# Patient Record
Sex: MALE | Race: ASIAN | NOT HISPANIC OR LATINO | Employment: UNEMPLOYED | URBAN - METROPOLITAN AREA
[De-identification: names, ages, dates, MRNs, and addresses within clinical notes are randomized per-mention and may not be internally consistent; named-entity substitution may affect disease eponyms.]

---

## 2017-03-07 ENCOUNTER — OFFICE VISIT - HEALTHEAST (OUTPATIENT)
Dept: FAMILY MEDICINE | Facility: CLINIC | Age: 47
End: 2017-03-07

## 2017-03-07 DIAGNOSIS — R17 JAUNDICE: ICD-10-CM

## 2017-03-07 DIAGNOSIS — K29.70 GASTRITIS: ICD-10-CM

## 2017-03-07 DIAGNOSIS — R07.81 RIB PAIN ON RIGHT SIDE: ICD-10-CM

## 2017-03-07 DIAGNOSIS — E78.00 HYPERCHOLESTEREMIA: ICD-10-CM

## 2017-03-07 LAB — LDLC SERPL CALC-MCNC: 116 MG/DL

## 2017-03-07 ASSESSMENT — MIFFLIN-ST. JEOR: SCORE: 1370.22

## 2017-03-08 LAB
HAV IGM SERPL QL IA: NEGATIVE
HBV CORE IGM SERPL QL IA: NEGATIVE
HBV SURFACE AG SERPL QL IA: NEGATIVE
HCV AB SERPL QL IA: NEGATIVE

## 2017-04-11 ENCOUNTER — COMMUNICATION - HEALTHEAST (OUTPATIENT)
Dept: FAMILY MEDICINE | Facility: CLINIC | Age: 47
End: 2017-04-11

## 2017-04-12 ENCOUNTER — OFFICE VISIT - HEALTHEAST (OUTPATIENT)
Dept: FAMILY MEDICINE | Facility: CLINIC | Age: 47
End: 2017-04-12

## 2017-04-12 DIAGNOSIS — K70.10 ALCOHOLIC HEPATITIS (H): ICD-10-CM

## 2017-04-12 DIAGNOSIS — K92.1 HEMATOCHEZIA: ICD-10-CM

## 2017-04-12 DIAGNOSIS — G47.00 INSOMNIA, UNSPECIFIED TYPE: ICD-10-CM

## 2017-04-12 DIAGNOSIS — F10.939 ALCOHOL WITHDRAWAL (H): ICD-10-CM

## 2017-04-12 ASSESSMENT — MIFFLIN-ST. JEOR: SCORE: 1381.56

## 2017-04-17 ENCOUNTER — RECORDS - HEALTHEAST (OUTPATIENT)
Dept: ADMINISTRATIVE | Facility: OTHER | Age: 47
End: 2017-04-17

## 2017-04-19 ENCOUNTER — RECORDS - HEALTHEAST (OUTPATIENT)
Dept: ADMINISTRATIVE | Facility: OTHER | Age: 47
End: 2017-04-19

## 2017-04-20 ENCOUNTER — RECORDS - HEALTHEAST (OUTPATIENT)
Dept: ADMINISTRATIVE | Facility: OTHER | Age: 47
End: 2017-04-20

## 2017-04-24 ENCOUNTER — RECORDS - HEALTHEAST (OUTPATIENT)
Dept: ADMINISTRATIVE | Facility: OTHER | Age: 47
End: 2017-04-24

## 2017-05-01 ENCOUNTER — OFFICE VISIT - HEALTHEAST (OUTPATIENT)
Dept: FAMILY MEDICINE | Facility: CLINIC | Age: 47
End: 2017-05-01

## 2017-05-01 DIAGNOSIS — G47.00 INSOMNIA, UNSPECIFIED TYPE: ICD-10-CM

## 2017-05-01 DIAGNOSIS — K70.10 ALCOHOLIC HEPATITIS WITHOUT ASCITES (H): ICD-10-CM

## 2017-05-01 ASSESSMENT — MIFFLIN-ST. JEOR: SCORE: 1408.77

## 2017-05-16 ENCOUNTER — RECORDS - HEALTHEAST (OUTPATIENT)
Dept: ADMINISTRATIVE | Facility: OTHER | Age: 47
End: 2017-05-16

## 2017-05-18 ENCOUNTER — RECORDS - HEALTHEAST (OUTPATIENT)
Dept: ADMINISTRATIVE | Facility: OTHER | Age: 47
End: 2017-05-18

## 2017-05-24 ENCOUNTER — COMMUNICATION - HEALTHEAST (OUTPATIENT)
Dept: FAMILY MEDICINE | Facility: CLINIC | Age: 47
End: 2017-05-24

## 2017-06-28 ENCOUNTER — RECORDS - HEALTHEAST (OUTPATIENT)
Dept: ADMINISTRATIVE | Facility: OTHER | Age: 47
End: 2017-06-28

## 2017-08-01 ENCOUNTER — OFFICE VISIT - HEALTHEAST (OUTPATIENT)
Dept: FAMILY MEDICINE | Facility: CLINIC | Age: 47
End: 2017-08-01

## 2017-08-01 ENCOUNTER — COMMUNICATION - HEALTHEAST (OUTPATIENT)
Dept: FAMILY MEDICINE | Facility: CLINIC | Age: 47
End: 2017-08-01

## 2017-08-01 DIAGNOSIS — K29.70 GASTRITIS, PRESENCE OF BLEEDING UNSPECIFIED, UNSPECIFIED CHRONICITY, UNSPECIFIED GASTRITIS TYPE: ICD-10-CM

## 2017-08-01 DIAGNOSIS — G47.00 INSOMNIA, UNSPECIFIED TYPE: ICD-10-CM

## 2017-08-01 DIAGNOSIS — R35.0 URINARY FREQUENCY: ICD-10-CM

## 2017-08-01 DIAGNOSIS — R60.9 EDEMA: ICD-10-CM

## 2017-08-01 DIAGNOSIS — L30.9 DERMATITIS: ICD-10-CM

## 2017-08-01 ASSESSMENT — MIFFLIN-ST. JEOR: SCORE: 1531.24

## 2017-09-07 ENCOUNTER — OFFICE VISIT - HEALTHEAST (OUTPATIENT)
Dept: FAMILY MEDICINE | Facility: CLINIC | Age: 47
End: 2017-09-07

## 2017-09-07 DIAGNOSIS — R35.0 URINARY FREQUENCY: ICD-10-CM

## 2017-09-07 DIAGNOSIS — K13.21 ORAL LEUKOPLAKIA: ICD-10-CM

## 2017-09-07 DIAGNOSIS — D12.6 BENIGN ADENOMATOUS POLYP OF LARGE INTESTINE: ICD-10-CM

## 2017-09-07 DIAGNOSIS — Z00.00 ROUTINE GENERAL MEDICAL EXAMINATION AT A HEALTH CARE FACILITY: ICD-10-CM

## 2017-09-07 DIAGNOSIS — R74.8 ELEVATED LIVER ENZYMES: ICD-10-CM

## 2017-09-07 LAB — PSA SERPL-MCNC: 0.5 NG/ML (ref 0–2.5)

## 2017-09-07 ASSESSMENT — MIFFLIN-ST. JEOR: SCORE: 1541.45

## 2017-10-05 ENCOUNTER — RECORDS - HEALTHEAST (OUTPATIENT)
Dept: ADMINISTRATIVE | Facility: OTHER | Age: 47
End: 2017-10-05

## 2017-10-05 ENCOUNTER — OFFICE VISIT - HEALTHEAST (OUTPATIENT)
Dept: FAMILY MEDICINE | Facility: CLINIC | Age: 47
End: 2017-10-05

## 2017-10-05 DIAGNOSIS — Z23 NEED FOR IMMUNIZATION AGAINST INFLUENZA: ICD-10-CM

## 2017-10-05 DIAGNOSIS — G47.30 SLEEP APNEA: ICD-10-CM

## 2017-10-05 DIAGNOSIS — J31.0 CHRONIC RHINITIS: ICD-10-CM

## 2017-10-05 DIAGNOSIS — R51.9 HEADACHE: ICD-10-CM

## 2017-10-05 DIAGNOSIS — K29.70 GASTRITIS, PRESENCE OF BLEEDING UNSPECIFIED, UNSPECIFIED CHRONICITY, UNSPECIFIED GASTRITIS TYPE: ICD-10-CM

## 2017-10-05 ASSESSMENT — MIFFLIN-ST. JEOR: SCORE: 1531.24

## 2017-11-13 ENCOUNTER — OFFICE VISIT - HEALTHEAST (OUTPATIENT)
Dept: SLEEP MEDICINE | Facility: CLINIC | Age: 47
End: 2017-11-13

## 2017-11-13 DIAGNOSIS — R06.83 SNORING: ICD-10-CM

## 2017-11-13 DIAGNOSIS — G47.10 HYPERSOMNIA: ICD-10-CM

## 2017-11-13 DIAGNOSIS — R29.818 SUSPECTED SLEEP APNEA: ICD-10-CM

## 2017-11-13 DIAGNOSIS — G47.8 SLEEP DYSFUNCTION WITH SLEEP STAGE DISTURBANCE: ICD-10-CM

## 2017-11-13 ASSESSMENT — MIFFLIN-ST. JEOR: SCORE: 1541.22

## 2018-01-08 ENCOUNTER — OFFICE VISIT - HEALTHEAST (OUTPATIENT)
Dept: FAMILY MEDICINE | Facility: CLINIC | Age: 48
End: 2018-01-08

## 2018-01-08 DIAGNOSIS — K29.70 GASTRITIS, PRESENCE OF BLEEDING UNSPECIFIED, UNSPECIFIED CHRONICITY, UNSPECIFIED GASTRITIS TYPE: ICD-10-CM

## 2018-01-08 DIAGNOSIS — R79.89 ABNORMAL TSH: ICD-10-CM

## 2018-01-08 DIAGNOSIS — G47.30 SLEEP APNEA: ICD-10-CM

## 2018-01-08 DIAGNOSIS — D64.9 ANEMIA: ICD-10-CM

## 2018-01-08 LAB
ERYTHROCYTE [DISTWIDTH] IN BLOOD BY AUTOMATED COUNT: 14.4 % (ref 11–14.5)
HCT VFR BLD AUTO: 48.2 % (ref 40–54)
HGB BLD-MCNC: 15.5 G/DL (ref 14–18)
MCH RBC QN AUTO: 29.4 PG (ref 27–34)
MCHC RBC AUTO-ENTMCNC: 32.1 G/DL (ref 32–36)
MCV RBC AUTO: 92 FL (ref 80–100)
PLATELET # BLD AUTO: 286 THOU/UL (ref 140–440)
PMV BLD AUTO: 7.8 FL (ref 7–10)
RBC # BLD AUTO: 5.26 MILL/UL (ref 4.4–6.2)
T4 FREE SERPL-MCNC: 1.1 NG/DL (ref 0.7–1.8)
TSH SERPL DL<=0.005 MIU/L-ACNC: 5.8 UIU/ML (ref 0.3–5)
WBC: 9.9 THOU/UL (ref 4–11)

## 2018-01-08 ASSESSMENT — MIFFLIN-ST. JEOR: SCORE: 1540.32

## 2018-03-12 ENCOUNTER — OFFICE VISIT - HEALTHEAST (OUTPATIENT)
Dept: FAMILY MEDICINE | Facility: CLINIC | Age: 48
End: 2018-03-12

## 2018-03-12 DIAGNOSIS — R07.89 CHEST WALL PAIN: ICD-10-CM

## 2018-03-12 DIAGNOSIS — E03.8 SUBCLINICAL HYPOTHYROIDISM: ICD-10-CM

## 2018-03-12 ASSESSMENT — MIFFLIN-ST. JEOR: SCORE: 1569.8

## 2018-07-09 ENCOUNTER — OFFICE VISIT - HEALTHEAST (OUTPATIENT)
Dept: FAMILY MEDICINE | Facility: CLINIC | Age: 48
End: 2018-07-09

## 2018-07-09 DIAGNOSIS — R07.9 CHEST PAIN: ICD-10-CM

## 2018-07-09 DIAGNOSIS — F17.200 TOBACCO USE DISORDER: ICD-10-CM

## 2018-07-09 DIAGNOSIS — G47.33 OBSTRUCTIVE SLEEP APNEA SYNDROME: ICD-10-CM

## 2018-07-09 DIAGNOSIS — E03.8 SUBCLINICAL HYPOTHYROIDISM: ICD-10-CM

## 2018-07-09 ASSESSMENT — MIFFLIN-ST. JEOR: SCORE: 1556.19

## 2018-08-01 ENCOUNTER — OFFICE VISIT - HEALTHEAST (OUTPATIENT)
Dept: FAMILY MEDICINE | Facility: CLINIC | Age: 48
End: 2018-08-01

## 2018-08-01 DIAGNOSIS — R51.9 GENERALIZED HEADACHES: ICD-10-CM

## 2018-08-01 DIAGNOSIS — R53.83 FATIGUE: ICD-10-CM

## 2018-08-01 DIAGNOSIS — E03.8 SUBCLINICAL HYPOTHYROIDISM: ICD-10-CM

## 2018-08-01 LAB
ANION GAP SERPL CALCULATED.3IONS-SCNC: 10 MMOL/L (ref 5–18)
BASOPHILS # BLD AUTO: 0.1 THOU/UL (ref 0–0.2)
BASOPHILS NFR BLD AUTO: 1 % (ref 0–2)
BUN SERPL-MCNC: 8 MG/DL (ref 8–22)
CALCIUM SERPL-MCNC: 9.6 MG/DL (ref 8.5–10.5)
CHLORIDE BLD-SCNC: 105 MMOL/L (ref 98–107)
CO2 SERPL-SCNC: 26 MMOL/L (ref 22–31)
CREAT SERPL-MCNC: 1.39 MG/DL (ref 0.7–1.3)
EOSINOPHIL # BLD AUTO: 0.2 THOU/UL (ref 0–0.4)
EOSINOPHIL NFR BLD AUTO: 3 % (ref 0–6)
ERYTHROCYTE [DISTWIDTH] IN BLOOD BY AUTOMATED COUNT: 13.8 % (ref 11–14.5)
GFR SERPL CREATININE-BSD FRML MDRD: 55 ML/MIN/1.73M2
GLUCOSE BLD-MCNC: 101 MG/DL (ref 70–125)
HCT VFR BLD AUTO: 39.6 % (ref 40–54)
HGB BLD-MCNC: 13.1 G/DL (ref 14–18)
LYMPHOCYTES # BLD AUTO: 2.5 THOU/UL (ref 0.8–4.4)
LYMPHOCYTES NFR BLD AUTO: 30 % (ref 20–40)
MCH RBC QN AUTO: 28.3 PG (ref 27–34)
MCHC RBC AUTO-ENTMCNC: 33 G/DL (ref 32–36)
MCV RBC AUTO: 86 FL (ref 80–100)
MONOCYTES # BLD AUTO: 1.3 THOU/UL (ref 0–0.9)
MONOCYTES NFR BLD AUTO: 16 % (ref 2–10)
NEUTROPHILS # BLD AUTO: 4.2 THOU/UL (ref 2–7.7)
NEUTROPHILS NFR BLD AUTO: 50 % (ref 50–70)
PLATELET # BLD AUTO: 205 THOU/UL (ref 140–440)
PMV BLD AUTO: 8.1 FL (ref 7–10)
POTASSIUM BLD-SCNC: 3.6 MMOL/L (ref 3.5–5)
RBC # BLD AUTO: 4.62 MILL/UL (ref 4.4–6.2)
SODIUM SERPL-SCNC: 141 MMOL/L (ref 136–145)
T4 FREE SERPL-MCNC: 1 NG/DL (ref 0.7–1.8)
TSH SERPL DL<=0.005 MIU/L-ACNC: 5.31 UIU/ML (ref 0.3–5)
WBC: 8.3 THOU/UL (ref 4–11)

## 2018-08-01 RX ORDER — ACETAMINOPHEN 325 MG/1
650 TABLET ORAL EVERY 6 HOURS PRN
Qty: 90 TABLET | Refills: 2 | Status: SHIPPED | OUTPATIENT
Start: 2018-08-01 | End: 2022-11-01

## 2018-08-01 ASSESSMENT — MIFFLIN-ST. JEOR: SCORE: 1569.8

## 2018-08-06 ENCOUNTER — OFFICE VISIT - HEALTHEAST (OUTPATIENT)
Dept: FAMILY MEDICINE | Facility: CLINIC | Age: 48
End: 2018-08-06

## 2018-08-06 DIAGNOSIS — R35.0 URINARY FREQUENCY: ICD-10-CM

## 2018-08-06 DIAGNOSIS — E03.8 SUBCLINICAL HYPOTHYROIDISM: ICD-10-CM

## 2018-08-06 DIAGNOSIS — R35.89 POLYURIA: ICD-10-CM

## 2018-08-06 LAB
ALBUMIN UR-MCNC: NEGATIVE MG/DL
APPEARANCE UR: CLEAR
BILIRUB UR QL STRIP: NEGATIVE
COLOR UR AUTO: YELLOW
GLUCOSE UR STRIP-MCNC: NEGATIVE MG/DL
HGB UR QL STRIP: NEGATIVE
KETONES UR STRIP-MCNC: NEGATIVE MG/DL
LEUKOCYTE ESTERASE UR QL STRIP: NEGATIVE
NITRATE UR QL: NEGATIVE
PH UR STRIP: 6.5 [PH] (ref 5–8)
PSA SERPL-MCNC: 0.4 NG/ML (ref 0–2.5)
SP GR UR STRIP: <=1.005 (ref 1–1.03)
UROBILINOGEN UR STRIP-ACNC: NORMAL

## 2018-08-06 ASSESSMENT — MIFFLIN-ST. JEOR: SCORE: 1561.86

## 2018-08-07 ENCOUNTER — AMBULATORY - HEALTHEAST (OUTPATIENT)
Dept: FAMILY MEDICINE | Facility: CLINIC | Age: 48
End: 2018-08-07

## 2018-08-07 RX ORDER — TAMSULOSIN HYDROCHLORIDE 0.4 MG/1
0.4 CAPSULE ORAL
Qty: 30 CAPSULE | Refills: 5 | Status: SHIPPED | OUTPATIENT
Start: 2018-08-07 | End: 2021-10-21

## 2018-08-08 ENCOUNTER — COMMUNICATION - HEALTHEAST (OUTPATIENT)
Dept: FAMILY MEDICINE | Facility: CLINIC | Age: 48
End: 2018-08-08

## 2018-08-15 ENCOUNTER — OFFICE VISIT - HEALTHEAST (OUTPATIENT)
Dept: FAMILY MEDICINE | Facility: CLINIC | Age: 48
End: 2018-08-15

## 2018-08-15 DIAGNOSIS — Z09 FOLLOW-UP EXAM AFTER TREATMENT: ICD-10-CM

## 2018-08-15 DIAGNOSIS — R79.89 ELEVATED SERUM CREATININE: ICD-10-CM

## 2018-08-15 LAB
ALBUMIN SERPL-MCNC: 3.4 G/DL (ref 3.5–5)
ANION GAP SERPL CALCULATED.3IONS-SCNC: 13 MMOL/L (ref 5–18)
BUN SERPL-MCNC: 4 MG/DL (ref 8–22)
CALCIUM SERPL-MCNC: 9.4 MG/DL (ref 8.5–10.5)
CHLORIDE BLD-SCNC: 107 MMOL/L (ref 98–107)
CO2 SERPL-SCNC: 21 MMOL/L (ref 22–31)
CREAT SERPL-MCNC: 1.16 MG/DL (ref 0.7–1.3)
GFR SERPL CREATININE-BSD FRML MDRD: >60 ML/MIN/1.73M2
GLUCOSE BLD-MCNC: 102 MG/DL (ref 70–125)
PHOSPHATE SERPL-MCNC: 3.6 MG/DL (ref 2.5–4.5)
POTASSIUM BLD-SCNC: 3.7 MMOL/L (ref 3.5–5)
SODIUM SERPL-SCNC: 141 MMOL/L (ref 136–145)

## 2018-08-15 ASSESSMENT — MIFFLIN-ST. JEOR: SCORE: 1568.08

## 2018-08-20 ENCOUNTER — OFFICE VISIT - HEALTHEAST (OUTPATIENT)
Dept: FAMILY MEDICINE | Facility: CLINIC | Age: 48
End: 2018-08-20

## 2018-08-20 DIAGNOSIS — R20.2 PARESTHESIA: ICD-10-CM

## 2018-08-20 DIAGNOSIS — M79.671 FOOT PAIN, BILATERAL: ICD-10-CM

## 2018-08-20 DIAGNOSIS — M79.672 FOOT PAIN, BILATERAL: ICD-10-CM

## 2018-08-20 LAB — VIT B12 SERPL-MCNC: 562 PG/ML (ref 213–816)

## 2018-08-20 ASSESSMENT — MIFFLIN-ST. JEOR: SCORE: 1547.12

## 2018-08-25 LAB — VIT B1 PYROPHOSHATE BLD-SCNC: 80 NMOL/L (ref 70–180)

## 2018-09-07 ENCOUNTER — COMMUNICATION - HEALTHEAST (OUTPATIENT)
Dept: ADMINISTRATIVE | Facility: CLINIC | Age: 48
End: 2018-09-07

## 2018-10-09 ENCOUNTER — COMMUNICATION - HEALTHEAST (OUTPATIENT)
Dept: SCHEDULING | Facility: CLINIC | Age: 48
End: 2018-10-09

## 2018-10-15 ENCOUNTER — OFFICE VISIT - HEALTHEAST (OUTPATIENT)
Dept: FAMILY MEDICINE | Facility: CLINIC | Age: 48
End: 2018-10-15

## 2018-10-15 DIAGNOSIS — G25.81 RESTLESS LEGS SYNDROME: ICD-10-CM

## 2018-10-15 ASSESSMENT — MIFFLIN-ST. JEOR: SCORE: 1553.92

## 2018-11-12 ENCOUNTER — OFFICE VISIT - HEALTHEAST (OUTPATIENT)
Dept: FAMILY MEDICINE | Facility: CLINIC | Age: 48
End: 2018-11-12

## 2018-11-12 DIAGNOSIS — G25.81 RESTLESS LEGS SYNDROME: ICD-10-CM

## 2018-11-12 DIAGNOSIS — G47.00 INSOMNIA, UNSPECIFIED TYPE: ICD-10-CM

## 2018-11-12 DIAGNOSIS — F10.10 ALCOHOL ABUSE: ICD-10-CM

## 2018-11-12 DIAGNOSIS — Z23 NEED FOR IMMUNIZATION AGAINST INFLUENZA: ICD-10-CM

## 2018-11-12 RX ORDER — LANOLIN ALCOHOL/MO/W.PET/CERES
3-9 CREAM (GRAM) TOPICAL
Qty: 90 TABLET | Refills: 6 | Status: SHIPPED | OUTPATIENT
Start: 2018-11-12 | End: 2021-10-21

## 2018-11-12 ASSESSMENT — MIFFLIN-ST. JEOR: SCORE: 1540.32

## 2019-02-06 ENCOUNTER — COMMUNICATION - HEALTHEAST (OUTPATIENT)
Dept: FAMILY MEDICINE | Facility: CLINIC | Age: 49
End: 2019-02-06

## 2019-02-06 DIAGNOSIS — K29.70 GASTRITIS AND GASTRODUODENITIS: ICD-10-CM

## 2019-02-06 DIAGNOSIS — K29.90 GASTRITIS AND GASTRODUODENITIS: ICD-10-CM

## 2019-06-11 ENCOUNTER — COMMUNICATION - HEALTHEAST (OUTPATIENT)
Dept: FAMILY MEDICINE | Facility: CLINIC | Age: 49
End: 2019-06-11

## 2019-06-13 ENCOUNTER — OFFICE VISIT - HEALTHEAST (OUTPATIENT)
Dept: FAMILY MEDICINE | Facility: CLINIC | Age: 49
End: 2019-06-13

## 2019-06-13 DIAGNOSIS — R91.1 NODULE OF UPPER LOBE OF LEFT LUNG: ICD-10-CM

## 2019-06-13 DIAGNOSIS — R07.89 CHEST WALL PAIN: ICD-10-CM

## 2019-06-13 DIAGNOSIS — K29.70 GASTRITIS, PRESENCE OF BLEEDING UNSPECIFIED, UNSPECIFIED CHRONICITY, UNSPECIFIED GASTRITIS TYPE: ICD-10-CM

## 2019-06-13 DIAGNOSIS — R10.13 EPIGASTRIC PAIN: ICD-10-CM

## 2019-06-13 ASSESSMENT — MIFFLIN-ST. JEOR: SCORE: 1556.19

## 2019-06-24 ENCOUNTER — OFFICE VISIT - HEALTHEAST (OUTPATIENT)
Dept: FAMILY MEDICINE | Facility: CLINIC | Age: 49
End: 2019-06-24

## 2019-06-24 DIAGNOSIS — M94.0 COSTOCHONDRITIS: ICD-10-CM

## 2019-06-24 DIAGNOSIS — K29.70 GASTRITIS, PRESENCE OF BLEEDING UNSPECIFIED, UNSPECIFIED CHRONICITY, UNSPECIFIED GASTRITIS TYPE: ICD-10-CM

## 2019-06-24 DIAGNOSIS — R91.1 NODULE OF UPPER LOBE OF LEFT LUNG: ICD-10-CM

## 2019-06-24 ASSESSMENT — MIFFLIN-ST. JEOR: SCORE: 1558.46

## 2019-07-05 ENCOUNTER — COMMUNICATION - HEALTHEAST (OUTPATIENT)
Dept: FAMILY MEDICINE | Facility: CLINIC | Age: 49
End: 2019-07-05

## 2019-07-05 DIAGNOSIS — M94.0 COSTOCHONDRITIS: ICD-10-CM

## 2019-07-15 ENCOUNTER — OFFICE VISIT - HEALTHEAST (OUTPATIENT)
Dept: FAMILY MEDICINE | Facility: CLINIC | Age: 49
End: 2019-07-15

## 2019-07-15 DIAGNOSIS — M79.671 RIGHT FOOT PAIN: ICD-10-CM

## 2019-07-15 DIAGNOSIS — M94.0 COSTOCHONDRITIS: ICD-10-CM

## 2019-07-15 DIAGNOSIS — R07.89 CHEST WALL PAIN: ICD-10-CM

## 2019-07-15 LAB — URATE SERPL-MCNC: 5.7 MG/DL (ref 3–8)

## 2019-07-15 ASSESSMENT — MIFFLIN-ST. JEOR: SCORE: 1538.04

## 2019-07-16 ENCOUNTER — COMMUNICATION - HEALTHEAST (OUTPATIENT)
Dept: FAMILY MEDICINE | Facility: CLINIC | Age: 49
End: 2019-07-16

## 2019-09-24 ENCOUNTER — OFFICE VISIT - HEALTHEAST (OUTPATIENT)
Dept: FAMILY MEDICINE | Facility: CLINIC | Age: 49
End: 2019-09-24

## 2019-09-24 DIAGNOSIS — R91.1 NODULE OF UPPER LOBE OF LEFT LUNG: ICD-10-CM

## 2019-09-24 DIAGNOSIS — R20.2 PARESTHESIA: ICD-10-CM

## 2019-09-24 DIAGNOSIS — E03.8 SUBCLINICAL HYPOTHYROIDISM: ICD-10-CM

## 2019-09-24 DIAGNOSIS — R07.9 CHEST PAIN, UNSPECIFIED TYPE: ICD-10-CM

## 2019-09-24 DIAGNOSIS — F17.200 TOBACCO USE DISORDER: ICD-10-CM

## 2019-09-24 LAB
ALBUMIN SERPL-MCNC: 3.6 G/DL (ref 3.5–5)
ALP SERPL-CCNC: 55 U/L (ref 45–120)
ALT SERPL W P-5'-P-CCNC: 15 U/L (ref 0–45)
ANION GAP SERPL CALCULATED.3IONS-SCNC: 10 MMOL/L (ref 5–18)
AST SERPL W P-5'-P-CCNC: 20 U/L (ref 0–40)
BILIRUB SERPL-MCNC: 0.3 MG/DL (ref 0–1)
BUN SERPL-MCNC: 7 MG/DL (ref 8–22)
CALCIUM SERPL-MCNC: 9.7 MG/DL (ref 8.5–10.5)
CHLORIDE BLD-SCNC: 109 MMOL/L (ref 98–107)
CO2 SERPL-SCNC: 24 MMOL/L (ref 22–31)
CREAT SERPL-MCNC: 1.13 MG/DL (ref 0.7–1.3)
GFR SERPL CREATININE-BSD FRML MDRD: >60 ML/MIN/1.73M2
GLUCOSE BLD-MCNC: 111 MG/DL (ref 70–125)
HBA1C MFR BLD: 5.1 % (ref 3.5–6)
POTASSIUM BLD-SCNC: 4.3 MMOL/L (ref 3.5–5)
PROT SERPL-MCNC: 7 G/DL (ref 6–8)
SODIUM SERPL-SCNC: 143 MMOL/L (ref 136–145)
TSH SERPL DL<=0.005 MIU/L-ACNC: 1.68 UIU/ML (ref 0.3–5)

## 2019-09-24 ASSESSMENT — MIFFLIN-ST. JEOR: SCORE: 1528.97

## 2019-09-25 ENCOUNTER — AMBULATORY - HEALTHEAST (OUTPATIENT)
Dept: FAMILY MEDICINE | Facility: CLINIC | Age: 49
End: 2019-09-25

## 2019-09-25 ENCOUNTER — COMMUNICATION - HEALTHEAST (OUTPATIENT)
Dept: FAMILY MEDICINE | Facility: CLINIC | Age: 49
End: 2019-09-25

## 2019-09-25 DIAGNOSIS — G62.9 NEUROPATHY: ICD-10-CM

## 2019-09-25 DIAGNOSIS — E55.9 VITAMIN D DEFICIENCY: ICD-10-CM

## 2019-09-25 DIAGNOSIS — R10.13 EPIGASTRIC PAIN: ICD-10-CM

## 2019-09-25 LAB — 25(OH)D3 SERPL-MCNC: 26.9 NG/ML (ref 30–80)

## 2019-09-25 RX ORDER — MAGNESIUM HYDROXIDE/ALUMINUM HYDROXICE/SIMETHICONE 120; 1200; 1200 MG/30ML; MG/30ML; MG/30ML
30 SUSPENSION ORAL EVERY 4 HOURS PRN
Qty: 800 ML | Refills: 3 | Status: SHIPPED | OUTPATIENT
Start: 2019-09-25 | End: 2021-10-21

## 2019-09-27 ENCOUNTER — HOSPITAL ENCOUNTER (OUTPATIENT)
Dept: CT IMAGING | Facility: HOSPITAL | Age: 49
Discharge: HOME | End: 2019-09-27
Attending: FAMILY MEDICINE

## 2019-09-27 DIAGNOSIS — R91.1 NODULE OF UPPER LOBE OF LEFT LUNG: ICD-10-CM

## 2019-09-30 ENCOUNTER — COMMUNICATION - HEALTHEAST (OUTPATIENT)
Dept: FAMILY MEDICINE | Facility: CLINIC | Age: 49
End: 2019-09-30

## 2019-12-04 ENCOUNTER — OFFICE VISIT - HEALTHEAST (OUTPATIENT)
Dept: FAMILY MEDICINE | Facility: CLINIC | Age: 49
End: 2019-12-04

## 2019-12-04 DIAGNOSIS — F17.200 TOBACCO USE DISORDER: ICD-10-CM

## 2019-12-04 DIAGNOSIS — R07.9 CHEST PAIN, UNSPECIFIED TYPE: ICD-10-CM

## 2019-12-04 DIAGNOSIS — G25.81 RESTLESS LEGS SYNDROME: ICD-10-CM

## 2019-12-04 ASSESSMENT — MIFFLIN-ST. JEOR: SCORE: 1506.29

## 2019-12-11 ENCOUNTER — HOSPITAL ENCOUNTER (OUTPATIENT)
Dept: CARDIOLOGY | Facility: HOSPITAL | Age: 49
Discharge: HOME | End: 2019-12-11
Attending: FAMILY MEDICINE

## 2019-12-11 DIAGNOSIS — R07.9 CHEST PAIN, UNSPECIFIED TYPE: ICD-10-CM

## 2019-12-11 LAB
CV STRESS CURRENT BP HE: NORMAL
CV STRESS CURRENT HR HE: 104
CV STRESS CURRENT HR HE: 105
CV STRESS CURRENT HR HE: 106
CV STRESS CURRENT HR HE: 112
CV STRESS CURRENT HR HE: 112
CV STRESS CURRENT HR HE: 116
CV STRESS CURRENT HR HE: 120
CV STRESS CURRENT HR HE: 120
CV STRESS CURRENT HR HE: 127
CV STRESS CURRENT HR HE: 130
CV STRESS CURRENT HR HE: 131
CV STRESS CURRENT HR HE: 134
CV STRESS CURRENT HR HE: 135
CV STRESS CURRENT HR HE: 142
CV STRESS CURRENT HR HE: 95
CV STRESS CURRENT HR HE: 96
CV STRESS CURRENT HR HE: 97
CV STRESS CURRENT HR HE: 98
CV STRESS DEVIATION TIME HE: NORMAL
CV STRESS ECHO PERCENT HR HE: NORMAL
CV STRESS EXERCISE STAGE HE: NORMAL
CV STRESS EXERCISE STAGE REACHED HE: NORMAL
CV STRESS FINAL RESTING BP HE: NORMAL
CV STRESS FINAL RESTING HR HE: 95
CV STRESS MAX HR HE: 142
CV STRESS MAX TREADMILL GRADE HE: 10
CV STRESS MAX TREADMILL SPEED HE: 1.7
CV STRESS PEAK DIA BP HE: NORMAL
CV STRESS PEAK SYS BP HE: NORMAL
CV STRESS PHASE HE: NORMAL
CV STRESS PROTOCOL HE: NORMAL
CV STRESS RESTING PT POSITION HE: NORMAL
CV STRESS RESTING PT POSITION HE: NORMAL
CV STRESS ST DEVIATION AMOUNT HE: NORMAL
CV STRESS ST DEVIATION ELEVATION HE: NORMAL
CV STRESS ST EVELATION AMOUNT HE: NORMAL
CV STRESS TEST TYPE HE: NORMAL
CV STRESS TOTAL STAGE TIME MIN 1 HE: NORMAL
RATE PRESSURE PRODUCT: NORMAL
STRESS ECHO BASELINE DIASTOLIC HE: 84
STRESS ECHO BASELINE HR: 96
STRESS ECHO BASELINE SYSTOLIC BP: 132
STRESS ECHO CALCULATED PERCENT HR: 82 %
STRESS ECHO LAST STRESS DIASTOLIC BP: 82
STRESS ECHO LAST STRESS HR: 142
STRESS ECHO LAST STRESS SYSTOLIC BP: 124
STRESS ECHO POST ESTIMATED WORKLOAD: 2.8
STRESS ECHO POST EXERCISE DUR MIN: 1
STRESS ECHO POST EXERCISE DUR SEC: 36
STRESS ECHO TARGET HR: 173

## 2019-12-19 ENCOUNTER — OFFICE VISIT - HEALTHEAST (OUTPATIENT)
Dept: FAMILY MEDICINE | Facility: CLINIC | Age: 49
End: 2019-12-19

## 2019-12-19 DIAGNOSIS — R07.9 CHEST PAIN, UNSPECIFIED TYPE: ICD-10-CM

## 2019-12-19 DIAGNOSIS — F17.200 TOBACCO USE DISORDER: ICD-10-CM

## 2019-12-19 DIAGNOSIS — G25.81 RESTLESS LEGS SYNDROME: ICD-10-CM

## 2019-12-19 DIAGNOSIS — E55.9 VITAMIN D DEFICIENCY: ICD-10-CM

## 2019-12-19 ASSESSMENT — MIFFLIN-ST. JEOR: SCORE: 1501.76

## 2019-12-23 ENCOUNTER — COMMUNICATION - HEALTHEAST (OUTPATIENT)
Dept: FAMILY MEDICINE | Facility: CLINIC | Age: 49
End: 2019-12-23

## 2019-12-23 ENCOUNTER — HOSPITAL ENCOUNTER (OUTPATIENT)
Dept: NUCLEAR MEDICINE | Facility: HOSPITAL | Age: 49
Discharge: HOME | End: 2019-12-23
Attending: FAMILY MEDICINE

## 2019-12-23 ENCOUNTER — HOSPITAL ENCOUNTER (OUTPATIENT)
Dept: CARDIOLOGY | Facility: HOSPITAL | Age: 49
Discharge: HOME | End: 2019-12-23
Attending: FAMILY MEDICINE

## 2019-12-23 DIAGNOSIS — R07.9 CHEST PAIN, UNSPECIFIED TYPE: ICD-10-CM

## 2019-12-23 LAB
CV STRESS CURRENT BP HE: NORMAL
CV STRESS CURRENT HR HE: 105
CV STRESS CURRENT HR HE: 105
CV STRESS CURRENT HR HE: 106
CV STRESS CURRENT HR HE: 109
CV STRESS CURRENT HR HE: 110
CV STRESS CURRENT HR HE: 112
CV STRESS CURRENT HR HE: 119
CV STRESS CURRENT HR HE: 120
CV STRESS CURRENT HR HE: 126
CV STRESS CURRENT HR HE: 128
CV STRESS CURRENT HR HE: 129
CV STRESS CURRENT HR HE: 130
CV STRESS CURRENT HR HE: 136
CV STRESS CURRENT HR HE: 137
CV STRESS CURRENT HR HE: 139
CV STRESS CURRENT HR HE: 141
CV STRESS CURRENT HR HE: 142
CV STRESS CURRENT HR HE: 142
CV STRESS CURRENT HR HE: 143
CV STRESS CURRENT HR HE: 144
CV STRESS CURRENT HR HE: 144
CV STRESS CURRENT HR HE: 145
CV STRESS CURRENT HR HE: 147
CV STRESS CURRENT HR HE: 147
CV STRESS CURRENT HR HE: 148
CV STRESS CURRENT HR HE: 150
CV STRESS CURRENT HR HE: 150
CV STRESS CURRENT HR HE: 97
CV STRESS CURRENT HR HE: 98
CV STRESS DEVIATION TIME HE: NORMAL
CV STRESS ECHO PERCENT HR HE: NORMAL
CV STRESS EXERCISE STAGE HE: NORMAL
CV STRESS EXERCISE STAGE REACHED HE: NORMAL
CV STRESS FINAL RESTING BP HE: NORMAL
CV STRESS FINAL RESTING HR HE: 106
CV STRESS MAX HR HE: 150
CV STRESS MAX TREADMILL GRADE HE: 12
CV STRESS MAX TREADMILL SPEED HE: 2.5
CV STRESS PEAK DIA BP HE: NORMAL
CV STRESS PEAK SYS BP HE: NORMAL
CV STRESS PHASE HE: NORMAL
CV STRESS PROTOCOL HE: NORMAL
CV STRESS RESTING PT POSITION HE: NORMAL
CV STRESS RESTING PT POSITION HE: NORMAL
CV STRESS ST DEVIATION AMOUNT HE: NORMAL
CV STRESS ST DEVIATION ELEVATION HE: NORMAL
CV STRESS ST EVELATION AMOUNT HE: NORMAL
CV STRESS TEST TYPE HE: NORMAL
CV STRESS TOTAL STAGE TIME MIN 1 HE: NORMAL
RATE PRESSURE PRODUCT: NORMAL
STRESS ECHO BASELINE DIASTOLIC HE: 76
STRESS ECHO BASELINE SYSTOLIC BP: 110
STRESS ECHO CALCULATED PERCENT HR: 87 %
STRESS ECHO LAST STRESS DIASTOLIC BP: 62
STRESS ECHO LAST STRESS HR: 150
STRESS ECHO LAST STRESS SYSTOLIC BP: 150
STRESS ECHO POST ESTIMATED WORKLOAD: 7.1
STRESS ECHO POST EXERCISE DUR MIN: 5
STRESS ECHO POST EXERCISE DUR SEC: 59
STRESS ECHO TARGET HR: 173

## 2020-02-12 ENCOUNTER — COMMUNICATION - HEALTHEAST (OUTPATIENT)
Dept: FAMILY MEDICINE | Facility: CLINIC | Age: 50
End: 2020-02-12

## 2020-02-12 DIAGNOSIS — K29.70 GASTRITIS AND GASTRODUODENITIS: ICD-10-CM

## 2020-02-12 DIAGNOSIS — K29.90 GASTRITIS AND GASTRODUODENITIS: ICD-10-CM

## 2020-11-21 ENCOUNTER — AMBULATORY - HEALTHEAST (OUTPATIENT)
Dept: NURSING | Facility: CLINIC | Age: 50
End: 2020-11-21

## 2021-01-05 ENCOUNTER — OFFICE VISIT - HEALTHEAST (OUTPATIENT)
Dept: FAMILY MEDICINE | Facility: CLINIC | Age: 51
End: 2021-01-05

## 2021-01-05 ENCOUNTER — AMBULATORY - HEALTHEAST (OUTPATIENT)
Dept: FAMILY MEDICINE | Facility: CLINIC | Age: 51
End: 2021-01-05

## 2021-01-05 DIAGNOSIS — K21.9 GASTROESOPHAGEAL REFLUX DISEASE, UNSPECIFIED WHETHER ESOPHAGITIS PRESENT: ICD-10-CM

## 2021-01-05 DIAGNOSIS — K29.70 GASTRITIS AND GASTRODUODENITIS: ICD-10-CM

## 2021-01-05 DIAGNOSIS — K29.90 GASTRITIS AND GASTRODUODENITIS: ICD-10-CM

## 2021-01-05 DIAGNOSIS — J02.9 SORE THROAT: ICD-10-CM

## 2021-01-05 ASSESSMENT — MIFFLIN-ST. JEOR: SCORE: 1510.83

## 2021-02-11 ENCOUNTER — OFFICE VISIT - HEALTHEAST (OUTPATIENT)
Dept: FAMILY MEDICINE | Facility: CLINIC | Age: 51
End: 2021-02-11

## 2021-02-11 DIAGNOSIS — E78.00 HYPERCHOLESTEREMIA: ICD-10-CM

## 2021-02-11 DIAGNOSIS — G25.81 RESTLESS LEGS SYNDROME: ICD-10-CM

## 2021-02-11 DIAGNOSIS — K21.9 GASTROESOPHAGEAL REFLUX DISEASE, UNSPECIFIED WHETHER ESOPHAGITIS PRESENT: ICD-10-CM

## 2021-02-11 DIAGNOSIS — Z00.00 ROUTINE GENERAL MEDICAL EXAMINATION AT A HEALTH CARE FACILITY: ICD-10-CM

## 2021-02-11 DIAGNOSIS — E03.8 SUBCLINICAL HYPOTHYROIDISM: ICD-10-CM

## 2021-02-11 DIAGNOSIS — R74.8 ELEVATED LIVER ENZYMES: ICD-10-CM

## 2021-02-11 LAB
ALBUMIN SERPL-MCNC: 3.4 G/DL (ref 3.5–5)
ALP SERPL-CCNC: 44 U/L (ref 45–120)
ALT SERPL W P-5'-P-CCNC: 37 U/L (ref 0–45)
ANION GAP SERPL CALCULATED.3IONS-SCNC: 11 MMOL/L (ref 5–18)
AST SERPL W P-5'-P-CCNC: 73 U/L (ref 0–40)
BILIRUB SERPL-MCNC: 0.3 MG/DL (ref 0–1)
BUN SERPL-MCNC: 12 MG/DL (ref 8–22)
CALCIUM SERPL-MCNC: 8.9 MG/DL (ref 8.5–10.5)
CHLORIDE BLD-SCNC: 106 MMOL/L (ref 98–107)
CO2 SERPL-SCNC: 23 MMOL/L (ref 22–31)
CREAT SERPL-MCNC: 1.05 MG/DL (ref 0.7–1.3)
GFR SERPL CREATININE-BSD FRML MDRD: >60 ML/MIN/1.73M2
GLUCOSE BLD-MCNC: 118 MG/DL (ref 70–125)
LDLC SERPL CALC-MCNC: 106 MG/DL
POTASSIUM BLD-SCNC: 3.8 MMOL/L (ref 3.5–5)
PROT SERPL-MCNC: 7 G/DL (ref 6–8)
SODIUM SERPL-SCNC: 140 MMOL/L (ref 136–145)
TSH SERPL DL<=0.005 MIU/L-ACNC: 4.23 UIU/ML (ref 0.3–5)

## 2021-02-11 RX ORDER — PRAMIPEXOLE DIHYDROCHLORIDE 0.25 MG/1
0.25 TABLET ORAL AT BEDTIME
Qty: 30 TABLET | Refills: 11 | Status: SHIPPED | OUTPATIENT
Start: 2021-02-11 | End: 2022-02-24

## 2021-02-11 ASSESSMENT — MIFFLIN-ST. JEOR: SCORE: 1519.9

## 2021-05-29 NOTE — PROGRESS NOTES
ASSESMENT AND PLAN:  Diagnoses and all orders for this visit:    Chest wall pain  I am concerned he has a significant case of costochondritis.  Discussed treatment options with the patient, reviewed the risks and benefits of prednisone as prescribed below, and discussed indications for routine and emergent follow-up with the patient.  Patient will schedule follow-up in about 10 days here in the clinic for recheck, sooner if problems.  -     predniSONE (DELTASONE) 20 MG tablet; Take 40 mg by mouth daily for 7 days.  Dispense: 14 tablet; Refill: 0    Gastritis, presence of bleeding unspecified, unspecified chronicity, unspecified gastritis type  Viewed the results of his colonoscopy and EGD from 2017 with the patient with the help of a professional .  Patient will continue to take the Maalox prescribed from the ER, refills sent as ordered below, and continue take his omeprazole.  ER evaluation referred him to the GI clinic for follow-up and I informed the patient that they would be following up with him on scheduling this appointment.    Nodule of upper lobe of left lung  Reviewed CT scan results with the patient, radiology recommended repeat CT scan in 3 months.  Counseled the patient on the importance of follow-up here in the clinic in 3 months at which time we will schedule the repeat CT.    Epigastric pain  -     aluminum-magnesium hydroxide-simethicone (MAALOX ADVANCED) 200-200-20 mg/5 mL Susp; Take 30 mL by mouth every 4 (four) hours as needed.  Dispense: 800 mL; Refill: 3          SUBJECTIVE: 49-year-old male here for follow-up on his recent emergency room visit for chest pain/abdominal pain.  Please see that work-up and notes for details.  We were able to review that today in detail with the patient and his wife with the help of a professional .  Reviewed the results of his CT scan, which was negative for any cause of his pain.  It did show a pulmonary nodule of 1 cm diameter that will  require follow-up as detailed above.  Since leaving the emergency room, he has been taking the Maalox as directed and is also been taking his omeprazole every day.  He is no longer taking naproxen.  Patient reports that the abdominal/chest pain has not improved.  If he continues to get intermittent to moderate to severe pain and he reports a focus of the pain bilaterally on the lateral upper chest wall.  The pain definitely worsens when he coughs or moves his trunk/chest wall into certain positions.  He reports that is been difficult for him to sleep the last couple of nights because when he puts direct pressure over the lateral chest wall when trying to lay on his side it becomes very painful.  No fevers, no vomiting, no black tarry stools, he gets occasional red blood in the stool-previous work-up had included EGD and colonoscopy which did show some hemorrhoids.  Patient has a past history of alcohol abuse and liver disease.  He has been completely sober from alcohol.    Past Medical History:   Diagnosis Date     GERD (gastroesophageal reflux disease)      Hepatitis     ETOH     Hyperlipidemia      Seizure (H)      Patient Active Problem List   Diagnosis     Nicotine Dependence     Hemorrhoids     Serum Enzyme Levels - AST (SGOT) Elevated     Limb Pain     Accessory Navicular     Lower Back Pain     Gastritis Due To H. Pylori     Abdominal Pain     Bone Cyst     Hypercholesteremia     Gastritis     Alcohol withdrawal seizure (H)     Hypokalemia     Hyponatremia     Thrombocytopenia (H)     Delirium tremens (H)     Hematochezia     Maxillary sinusitis, acute     Insomnia     Alcoholic hepatitis     Benign adenomatous polyp of large intestine     Elevated liver enzymes     Oral leukoplakia     Subclinical hypothyroidism     Alcohol abuse     Nodule of upper lobe of left lung     Current Outpatient Medications   Medication Sig Dispense Refill     acetaminophen (TYLENOL) 325 MG tablet Take 2 tablets (650 mg total) by  "mouth every 6 (six) hours as needed for pain (takes 2 at a time). 90 tablet 2     aluminum-magnesium hydroxide-simethicone (MAALOX ADVANCED) 200-200-20 mg/5 mL Susp Take 30 mL by mouth every 4 (four) hours as needed. 800 mL 3     melatonin 3 mg Tab tablet Take 1-3 tablets (3-9 mg total) by mouth at bedtime as needed. 90 tablet 6     multivitamin therapeutic (THERAGRAN) tablet Take 1 tablet by mouth daily.  0     omeprazole (PRILOSEC) 20 MG capsule Take 1 capsule (20 mg total) by mouth daily. 30 capsule 6     omeprazole (PRILOSEC) 20 MG capsule Take 1 capsule (20 mg total) by mouth daily. 30 capsule 11     pramipexole (MIRAPEX) 0.25 MG tablet Take 1 tablet (0.25 mg total) by mouth at bedtime. 30 tablet 6     predniSONE (DELTASONE) 20 MG tablet Take 40 mg by mouth daily for 7 days. 14 tablet 0     tamsulosin (FLOMAX) 0.4 mg cap Take 1 capsule (0.4 mg total) by mouth Daily after breakfast. 30 capsule 5     thiamine 50 MG tablet Take 1 tablet (50 mg total) by mouth daily.  0     No current facility-administered medications for this visit.      Social History     Tobacco Use   Smoking Status Current Every Day Smoker   Smokeless Tobacco Never Used   Tobacco Comment    Betelnut without Tobacco       OBJECTICE: /62   Pulse 96   Temp 97.6  F (36.4  C) (Oral)   Resp 24   Ht 5' 3\" (1.6 m)   Wt 175 lb 8 oz (79.6 kg)   SpO2 91%   BMI 31.09 kg/m       No results found for this or any previous visit (from the past 24 hour(s)).     GEN-alert, appropriate, in no apparent distress   HEENT-oropharynx is clear, neck is supple with no palpable mass or lymphadenopathy   CV-regular rate and rhythm with no murmur, no ankle edema   RESP-lungs clear to auscultation   ABDOMINAL-soft and nontender to palpation with no palpable mass organomegaly   Musculoskeletal- exquisite pain that repeats his symptoms with direct palpation of the lateral chest wall bilaterally.   SKIN-no ulcers or vesicles      Boaz Bell"

## 2021-05-29 NOTE — TELEPHONE ENCOUNTER
New Appointment Needed  What is the reason for the visit:    Inpatient/ED Follow Up Appt Request  At what hospital or facility were you seen?: [facility]  What is the reason you were seen?: Pain in abdomen and chest  What date were you admitted?: date: 6/10/19  What date were you discharged?: date: 6/10/19  What was the recommended timeframe for your follow up appointment?: within 2 days  Provider Preference: PCP only - wants just Dr Bell, declined appointments offered with other providers including [facility].  How soon do you need to be seen?: as soon as can be scheduled.  Waitlist offered?: No  Okay to leave a detailed message:  Yes

## 2021-05-29 NOTE — PROGRESS NOTES
ASSESMENT AND PLAN:  Diagnoses and all orders for this visit:    Costochondritis  Good response to the initial course of prednisone but some recurrence since completing that as detailed below.  Therefore, we are going to try a longer tapering course of prednisone as prescribed below.  Reviewed the risks and benefits of the medication with the patient with the help of a professional .  Follow-up if he does not have complete resolution of his symptoms with this 15-day treatment.  -     predniSONE (DELTASONE) 10 mg tablet; 30 mg every morning for 5 days then 20 mg for 5 days then 10 mg for 5 days.  Dispense: 30 tablet; Refill: 0    Nodule of upper lobe of left lung  Reviewed his previous CT scan results, reviewed differential diagnosis, counseling done on the importance of follow-up in 3 months here in the clinic at which time we will arrange for the follow-up CT scan.    Gastritis, presence of bleeding unspecified, unspecified chronicity, unspecified gastritis type  Good improvement in his symptoms as detailed below, continue omeprazole daily.        SUBJECTIVE: 49-year-old male comes in today for follow-up on his recent visit here in the clinic for chest pain.  Please see that note for details.  He had excellent response to prednisone.  He took it for 7 days.  He had complete resolution of his chest pain during the daytime and would have a little bit of mild chest pain when laying on his lateral chest wall at night but it was very tolerable.  Then after completing the 7-day treatment, he started to have some recurrence of some daytime pain and some worsening pain at night.  He has occasional nonproductive cough with no hemoptysis.  Coughing does make the pain worse.  He has not been having fevers or chills.  No shortness of breath.  No blood in the stool or black tarry stools.  Patient reports that the epigastric pain he was having previously has resolved.    Past Medical History:   Diagnosis Date     GERD  (gastroesophageal reflux disease)      Hepatitis     ETOH     Hyperlipidemia      Seizure (H)      Patient Active Problem List   Diagnosis     Nicotine Dependence     Hemorrhoids     Serum Enzyme Levels - AST (SGOT) Elevated     Limb Pain     Accessory Navicular     Lower Back Pain     Gastritis Due To H. Pylori     Abdominal Pain     Bone Cyst     Hypercholesteremia     Gastritis     Alcohol withdrawal seizure (H)     Hypokalemia     Hyponatremia     Thrombocytopenia (H)     Delirium tremens (H)     Hematochezia     Maxillary sinusitis, acute     Insomnia     Alcoholic hepatitis     Benign adenomatous polyp of large intestine     Elevated liver enzymes     Oral leukoplakia     Subclinical hypothyroidism     Alcohol abuse     Nodule of upper lobe of left lung     Current Outpatient Medications   Medication Sig Dispense Refill     acetaminophen (TYLENOL) 325 MG tablet Take 2 tablets (650 mg total) by mouth every 6 (six) hours as needed for pain (takes 2 at a time). 90 tablet 2     aluminum-magnesium hydroxide-simethicone (MAALOX ADVANCED) 200-200-20 mg/5 mL Susp Take 30 mL by mouth every 4 (four) hours as needed. 800 mL 3     melatonin 3 mg Tab tablet Take 1-3 tablets (3-9 mg total) by mouth at bedtime as needed. 90 tablet 6     multivitamin therapeutic (THERAGRAN) tablet Take 1 tablet by mouth daily.  0     tamsulosin (FLOMAX) 0.4 mg cap Take 1 capsule (0.4 mg total) by mouth Daily after breakfast. 30 capsule 5     thiamine 50 MG tablet Take 1 tablet (50 mg total) by mouth daily.  0     omeprazole (PRILOSEC) 20 MG capsule Take 1 capsule (20 mg total) by mouth daily. 30 capsule 11     pramipexole (MIRAPEX) 0.25 MG tablet Take 1 tablet (0.25 mg total) by mouth at bedtime. 30 tablet 6     predniSONE (DELTASONE) 10 mg tablet 30 mg every morning for 5 days then 20 mg for 5 days then 10 mg for 5 days. 30 tablet 0     No current facility-administered medications for this visit.      Social History     Tobacco Use   Smoking  "Status Current Every Day Smoker   Smokeless Tobacco Never Used   Tobacco Comment    Betelnut without Tobacco       OBJECTICE: /64 (Patient Site: Left Arm)   Pulse (!) 104   Temp 97.9  F (36.6  C) (Oral)   Resp 20   Ht 5' 3\" (1.6 m)   Wt 176 lb (79.8 kg)   SpO2 97%   BMI 31.18 kg/m       No results found for this or any previous visit (from the past 24 hour(s)).     GEN-alert, appropriate, in no apparent distress   HEENT-oropharynx is clear, neck is supple, sclera are clear   CV-regular rate and rhythm with no murmur   RESP-lungs clear to auscultation   ABDOMINAL-soft and nontender to palpation   Musculoskeletal- some repetition of his pain with squeezing of the lateral aspects of the rib cage/chest wall bilaterally.   SKIN-no ulcers or vesicles overlying his areas of pain.      Boaz Bell          "

## 2021-05-30 VITALS — BODY MASS INDEX: 25.34 KG/M2 | HEIGHT: 63 IN | WEIGHT: 143 LBS

## 2021-05-30 VITALS — BODY MASS INDEX: 24.14 KG/M2 | WEIGHT: 136.25 LBS | HEIGHT: 63 IN

## 2021-05-30 VITALS — BODY MASS INDEX: 24.27 KG/M2 | WEIGHT: 137 LBS | HEIGHT: 63 IN

## 2021-05-30 NOTE — TELEPHONE ENCOUNTER
RN cannot approve Refill Request    RN can NOT refill this medication med is not covered by policy/route to provider. Last office visit: 6/24/2019 Boaz Bell MD Last Physical: 9/7/2017 Last MTM visit: Visit date not found Last visit same specialty: 6/24/2019 Boaz Bell MD.  Next visit within 3 mo: Visit date not found  Next physical within 3 mo: Visit date not found      Sarahi Millan, [facility] Triage/Med Refill 7/5/2019    Requested Prescriptions   Pending Prescriptions Disp Refills     predniSONE (DELTASONE) 10 mg tablet [Pharmacy Med Name: PREDNISONE 10 MG TABLET] 30 tablet 0     Sig: TAKE 3 TABLETS ( 30 MG TOTAL) BY MOUTH EVERY MORNING FOR 5 DAYS THEN TAKE 2 TABLETS ( 20 MG TOTAL) FOR 5 DAYS THEN TAKE 1 TABLET ( 10 MG ) F.       There is no refill protocol information for this order

## 2021-05-30 NOTE — TELEPHONE ENCOUNTER
This medication should not be refilled, he was seen at the emergency room over the weekend it should take the medications prescribed there.  Follow-up in the clinic as directed.

## 2021-05-30 NOTE — TELEPHONE ENCOUNTER
----- Message from Boaz Bell MD sent at 7/16/2019  9:08 AM CDT -----  Please inform patient the blood test is normal, no evidence of gout.

## 2021-05-30 NOTE — PROGRESS NOTES
ASSESMENT AND PLAN:  Diagnoses and all orders for this visit:    Chest wall pain, likely costochondritis  Reviewed the evaluation that he had recently at the emergency room with the patient and his wife with the help of a professional .  The fact that his symptoms responded completely and resolved with prednisone reinforces the current diagnosis.  However, the recurrence is concerning because we do not want to be using repeated courses of prednisone, we reviewed the risks and benefits of the medication.  We will do a additional prednisone course with taper as prescribed below as well as occasional tramadol for the more severe breakthrough pain.  Hopefully this will resolve his symptoms completely.  If not, coming to clinic for reevaluation.  Otherwise he is coming back in September at which time he will be due for repeat CAT scan.    -     predniSONE (DELTASONE) 10 mg tablet; 30 mg every morning for 7 days then 20 mg for 7 days then 10 mg for 7 days.  Dispense: 42 tablet; Refill: 0  -     traMADol (ULTRAM) 50 mg tablet; Take 1 tablet (50 mg total) by mouth every 6 (six) hours as needed for pain.  Dispense: 12 tablet; Refill: 0  -     Uric Acid        Right foot pain  Gout would be in the differential diagnosis and this could be contributing to his chest wall pain as well.  -     Uric Acid  Will call the patient with his test results when they are available.        SUBJECTIVE: 49-year-old male who I had seen in clinic recently with chest wall pain.  Please see that clinic note for details.  He took a 15-day tapering course of prednisone and had complete resolution of his chest pain.  However, he then had recurrence.  He went into the emergency room.  He was evaluated there and diagnosed with chest wall pain.  Since that time, the patient has had persistent moderate pain in the left anterior, lateral, and posterior chest wall.  The pain worsens significantly with direct palpation of the area.  No ulcers or  rash.  No shortness of breath.  No fevers.  No vomiting.  He does note that he has had some right foot pain over the past week.  It was associated with some redness and swelling when it first started.  Over the distal dorsum of the foot around the origination of the second and third toes.  He has not had similar symptoms in the past and cannot think of any injury.  Pain is moderate in severity, worse with walking or direct pressure over the area.    Past Medical History:   Diagnosis Date     GERD (gastroesophageal reflux disease)      Hepatitis     ETOH     Hyperlipidemia      Seizure (H)      Patient Active Problem List   Diagnosis     Nicotine Dependence     Hemorrhoids     Serum Enzyme Levels - AST (SGOT) Elevated     Limb Pain     Accessory Navicular     Lower Back Pain     Gastritis Due To H. Pylori     Abdominal Pain     Bone Cyst     Hypercholesteremia     Gastritis     Hypokalemia     Hyponatremia     Hematochezia     Maxillary sinusitis, acute     Insomnia     Alcoholic hepatitis     Benign adenomatous polyp of large intestine     Elevated liver enzymes     Oral leukoplakia     Subclinical hypothyroidism     Alcohol abuse     Nodule of upper lobe of left lung     Current Outpatient Medications   Medication Sig Dispense Refill     acetaminophen (TYLENOL) 325 MG tablet Take 2 tablets (650 mg total) by mouth every 6 (six) hours as needed for pain (takes 2 at a time). 90 tablet 2     aluminum-magnesium hydroxide-simethicone (MAALOX ADVANCED) 200-200-20 mg/5 mL Susp Take 30 mL by mouth every 4 (four) hours as needed. 800 mL 3     ibuprofen (ADVIL,MOTRIN) 600 MG tablet Take 1 tablet (600 mg total) by mouth every 6 (six) hours as needed for pain. 30 tablet 0     multivitamin therapeutic (THERAGRAN) tablet Take 1 tablet by mouth daily.  0     omeprazole (PRILOSEC) 20 MG capsule Take 1 capsule (20 mg total) by mouth daily. 30 capsule 11     thiamine 50 MG tablet Take 1 tablet (50 mg total) by mouth daily.  0      "melatonin 3 mg Tab tablet Take 1-3 tablets (3-9 mg total) by mouth at bedtime as needed. 90 tablet 6     pramipexole (MIRAPEX) 0.25 MG tablet Take 1 tablet (0.25 mg total) by mouth at bedtime. 30 tablet 6     predniSONE (DELTASONE) 10 mg tablet 30 mg every morning for 7 days then 20 mg for 7 days then 10 mg for 7 days. 42 tablet 0     tamsulosin (FLOMAX) 0.4 mg cap Take 1 capsule (0.4 mg total) by mouth Daily after breakfast. 30 capsule 5     traMADol (ULTRAM) 50 mg tablet Take 1 tablet (50 mg total) by mouth every 6 (six) hours as needed for pain. 12 tablet 0     No current facility-administered medications for this visit.      Social History     Tobacco Use   Smoking Status Current Every Day Smoker   Smokeless Tobacco Never Used   Tobacco Comment    Betelnut without Tobacco       OBJECTICE: BP 94/60 (Patient Site: Right Arm, Patient Position: Sitting)   Pulse 93   Temp 98.7  F (37.1  C) (Oral)   Resp 20   Ht 5' 2\" (1.575 m)   Wt 175 lb (79.4 kg)   SpO2 96%   BMI 32.01 kg/m       No results found for this or any previous visit (from the past 24 hour(s)).     GEN-alert, appropriate, in no apparent distress   Musculoskeletal- there is a mild area of swelling along with some moderate tenderness to direct palpation of the foot and the anatomy described above.  Repetition of his chest pain with direct palpation of the right anterior and lateral and posterior chest wall.   CV-regular rate and rhythm with no murmur   RESP-lungs clear to auscultation   ABDOMINAL-soft and nontender   EXTREM-warm, no ankle edema, no calf tenderness   SKIN-no ulcers or vesicles      Boaz Bell"

## 2021-05-31 VITALS — WEIGHT: 172.25 LBS | HEIGHT: 63 IN | BODY MASS INDEX: 30.52 KG/M2

## 2021-05-31 VITALS — HEIGHT: 63 IN | WEIGHT: 170 LBS | BODY MASS INDEX: 30.12 KG/M2

## 2021-05-31 VITALS — HEIGHT: 63 IN | BODY MASS INDEX: 30.48 KG/M2 | WEIGHT: 172 LBS

## 2021-05-31 VITALS — WEIGHT: 172.2 LBS | BODY MASS INDEX: 30.51 KG/M2 | HEIGHT: 63 IN

## 2021-06-01 ENCOUNTER — OFFICE VISIT - HEALTHEAST (OUTPATIENT)
Dept: FAMILY MEDICINE | Facility: CLINIC | Age: 51
End: 2021-06-01

## 2021-06-01 VITALS — BODY MASS INDEX: 31.63 KG/M2 | HEIGHT: 63 IN | WEIGHT: 178.5 LBS

## 2021-06-01 VITALS — HEIGHT: 63 IN | BODY MASS INDEX: 31.63 KG/M2 | WEIGHT: 178.5 LBS

## 2021-06-01 VITALS — HEIGHT: 63 IN | BODY MASS INDEX: 30.74 KG/M2 | WEIGHT: 173.5 LBS

## 2021-06-01 VITALS — HEIGHT: 63 IN | BODY MASS INDEX: 31.1 KG/M2 | WEIGHT: 175.5 LBS

## 2021-06-01 VITALS — WEIGHT: 176.75 LBS | HEIGHT: 63 IN | BODY MASS INDEX: 31.32 KG/M2

## 2021-06-01 VITALS — WEIGHT: 178.12 LBS | BODY MASS INDEX: 31.56 KG/M2 | HEIGHT: 63 IN

## 2021-06-01 DIAGNOSIS — G47.33 OBSTRUCTIVE SLEEP APNEA: ICD-10-CM

## 2021-06-01 DIAGNOSIS — L30.9 DERMATITIS: ICD-10-CM

## 2021-06-01 DIAGNOSIS — R61 NIGHT SWEATS: ICD-10-CM

## 2021-06-01 DIAGNOSIS — Z86.11 HISTORY OF ACTIVE TUBERCULOSIS: ICD-10-CM

## 2021-06-01 DIAGNOSIS — R61 DIAPHORESIS: ICD-10-CM

## 2021-06-01 DIAGNOSIS — K64.9 HEMORRHOIDS, UNSPECIFIED HEMORRHOID TYPE: ICD-10-CM

## 2021-06-01 RX ORDER — TRIAMCINOLONE ACETONIDE 1 MG/G
CREAM TOPICAL 2 TIMES DAILY
Qty: 80 G | Refills: 1 | Status: SHIPPED | OUTPATIENT
Start: 2021-06-01 | End: 2021-10-21

## 2021-06-01 ASSESSMENT — MIFFLIN-ST. JEOR: SCORE: 1587.94

## 2021-06-01 NOTE — TELEPHONE ENCOUNTER
----- Message from Boaz Bell MD sent at 9/30/2019  1:12 PM CDT -----  Please inform patient of good news no change in the lung nodule compared to previous scan.  Therefore, he can follow-up in 1 year on this and we will discuss at that time whether to do another CT scan.

## 2021-06-01 NOTE — PROGRESS NOTES
ASSESMENT AND PLAN:  Diagnoses and all orders for this visit:    Chest pain, unspecified type  Improving.  Likely secondary to combination of reflux and chest wall etiology.  He completed the course of medication for costochondritis.  Review and counseling done on his regiment for reflux.  He will continue with omeprazole and Mylanta.  We discussed indications for follow-up.    Nodule of upper lobe of left lung  Reviewed the results from his previous CT scan which showed a left upper lobe area of concern, we are scheduling a follow-up CT as ordered below.  -     CT Chest Without Contrast; Future; Expected date: 09/24/2019    Nicotine Dependence  Cessation counseling done today with the patient.  Will update immunizations based on his smoking history and related risks.  -     Pneumococcal polysaccharide vaccine 23-valent 1 yo or older, subq/IM    Subclinical hypothyroidism  -     Thyroid Cascade    Paresthesia  Uncertain etiology.  Lab work-up as ordered below.  We will call the patient when his lab results are available to come up with a treatment plan.  If the labs are all normal or near normal then will consider trying a trial of gabapentin at bedtime.  -     Glycosylated Hemoglobin A1c  -     Comprehensive Metabolic Panel  -     Vitamin D, Total (25-Hydroxy)    Other orders  -     Influenza, Seasonal Quad, PF =/> 6months          SUBJECTIVE: 49-year-old male with a main concern about a burning and tingling sensation that he is getting in the lower legs bilaterally.  It extends from below the knee into the feet on both sides.  It is worse at night.  Apparently, the restless legs treatment has not been helpful.  Patient reports that the discomfort is moderate and it can be disruptive to his sleep.  Patient has a past history of alcohol abuse and elevated liver enzymes, he has been completely sober with no alcohol use since his last visit with me.  He continues to smoke, currently had about 3 cigarettes/day.  Since  I saw him last time his chest pain has improved significantly but still has not resolved completely.  He reports that he gets an achy or burning chest pain intermittently, no exertional trigger, improving frequency and intensity since last visit.  Patient does have a significant smoking history and had a pulmonary abnormality on previous CT scan of about 1 cm.  He is due for the 3-month follow-up CT scan that have been recommended.    Past Medical History:   Diagnosis Date     GERD (gastroesophageal reflux disease)      Hepatitis     ETOH     Hyperlipidemia      Seizure (H)      Patient Active Problem List   Diagnosis     Nicotine Dependence     Hemorrhoids     Serum Enzyme Levels - AST (SGOT) Elevated     Limb Pain     Accessory Navicular     Lower Back Pain     Gastritis Due To H. Pylori     Abdominal Pain     Bone Cyst     Hypercholesteremia     Gastritis     Hypokalemia     Hyponatremia     Hematochezia     Maxillary sinusitis, acute     Insomnia     Alcoholic hepatitis     Benign adenomatous polyp of large intestine     Elevated liver enzymes     Oral leukoplakia     Subclinical hypothyroidism     Alcohol abuse     Nodule of upper lobe of left lung     Current Outpatient Medications   Medication Sig Dispense Refill     acetaminophen (TYLENOL) 325 MG tablet Take 2 tablets (650 mg total) by mouth every 6 (six) hours as needed for pain (takes 2 at a time). 90 tablet 2     aluminum-magnesium hydroxide-simethicone (MAALOX ADVANCED) 200-200-20 mg/5 mL Susp Take 30 mL by mouth every 4 (four) hours as needed. 800 mL 3     ibuprofen (ADVIL,MOTRIN) 600 MG tablet Take 1 tablet (600 mg total) by mouth every 6 (six) hours as needed for pain. 30 tablet 0     melatonin 3 mg Tab tablet Take 1-3 tablets (3-9 mg total) by mouth at bedtime as needed. 90 tablet 6     multivitamin therapeutic (THERAGRAN) tablet Take 1 tablet by mouth daily.  0     omeprazole (PRILOSEC) 20 MG capsule Take 1 capsule (20 mg total) by mouth daily. 30  "capsule 11     pramipexole (MIRAPEX) 0.25 MG tablet Take 1 tablet (0.25 mg total) by mouth at bedtime. 30 tablet 6     tamsulosin (FLOMAX) 0.4 mg cap Take 1 capsule (0.4 mg total) by mouth Daily after breakfast. 30 capsule 5     thiamine 50 MG tablet Take 1 tablet (50 mg total) by mouth daily.  0     traMADol (ULTRAM) 50 mg tablet Take 1 tablet (50 mg total) by mouth every 6 (six) hours as needed for pain. 12 tablet 0     No current facility-administered medications for this visit.      Social History     Tobacco Use   Smoking Status Current Every Day Smoker   Smokeless Tobacco Never Used   Tobacco Comment    Betelnut without Tobacco       OBJECTICE: /64   Pulse 97   Temp 97.8  F (36.6  C) (Oral)   Resp 24   Ht 5' 2\" (1.575 m)   Wt 173 lb (78.5 kg)   SpO2 97%   BMI 31.64 kg/m       Recent Results (from the past 24 hour(s))   Glycosylated Hemoglobin A1c    Collection Time: 09/24/19  1:00 PM   Result Value Ref Range    Hemoglobin A1c 5.1 3.5 - 6.0 %        GEN-alert, appropriate, in no apparent distress   Neurologic-strength and sensation are intact and symmetric in the lower extremities bilaterally.   CV-regular rate and rhythm with no murmur   RESP-lungs clear to auscultation   ABDOMINAL-soft, nontender, no palpable masses organomegaly   EXTREM-warm with no ankle edema   SKIN-no ulcers or vesicles, no jaundice.      Boaz Bell          "

## 2021-06-01 NOTE — TELEPHONE ENCOUNTER
----- Message from Boaz Bell MD sent at 9/25/2019  1:08 PM CDT -----  Please review lab results with the patient.  I would like him to start on vitamin D once per day.  For his leg symptoms that are bothering him at night I would like him to start on gabapentin 300 mg at bedtime.  Recheck with me here in the clinic in 2 to 3 months, bring all medication bottles with him at that time.  Sooner follow-up if medication side effects or problems.

## 2021-06-02 VITALS — HEIGHT: 63 IN | BODY MASS INDEX: 31.01 KG/M2 | WEIGHT: 175 LBS

## 2021-06-02 VITALS — BODY MASS INDEX: 30.48 KG/M2 | WEIGHT: 172 LBS | HEIGHT: 63 IN

## 2021-06-03 VITALS
HEART RATE: 97 BPM | RESPIRATION RATE: 24 BRPM | BODY MASS INDEX: 31.83 KG/M2 | SYSTOLIC BLOOD PRESSURE: 110 MMHG | TEMPERATURE: 97.8 F | WEIGHT: 173 LBS | DIASTOLIC BLOOD PRESSURE: 64 MMHG | OXYGEN SATURATION: 97 % | HEIGHT: 62 IN

## 2021-06-03 VITALS — BODY MASS INDEX: 32.2 KG/M2 | WEIGHT: 175 LBS | HEIGHT: 62 IN

## 2021-06-03 VITALS — BODY MASS INDEX: 31.18 KG/M2 | HEIGHT: 63 IN | WEIGHT: 176 LBS

## 2021-06-03 VITALS — HEIGHT: 63 IN | BODY MASS INDEX: 31.1 KG/M2 | WEIGHT: 175.5 LBS

## 2021-06-04 VITALS
BODY MASS INDEX: 30.73 KG/M2 | RESPIRATION RATE: 24 BRPM | SYSTOLIC BLOOD PRESSURE: 100 MMHG | HEART RATE: 98 BPM | OXYGEN SATURATION: 93 % | DIASTOLIC BLOOD PRESSURE: 50 MMHG | WEIGHT: 167 LBS | HEIGHT: 62 IN | TEMPERATURE: 98.1 F

## 2021-06-04 VITALS
OXYGEN SATURATION: 99 % | RESPIRATION RATE: 20 BRPM | HEART RATE: 92 BPM | TEMPERATURE: 99.4 F | BODY MASS INDEX: 30.91 KG/M2 | HEIGHT: 62 IN | DIASTOLIC BLOOD PRESSURE: 60 MMHG | WEIGHT: 168 LBS | SYSTOLIC BLOOD PRESSURE: 98 MMHG

## 2021-06-04 NOTE — PROGRESS NOTES
ASSESMENT AND PLAN:  Diagnoses and all orders for this visit:    Chest pain, unspecified type  This has been extensively evaluated and treated in the past, see previous notes for details.  The pain pattern does not sound cardiac but some of the associated symptoms detailed below do have concern for cardiac etiology and given the progressively worsening nature of his symptoms we decided to go ahead with stress testing.  He will follow-up 1 week after the stress test is completed.  -     Stress Test Graded; Future; Expected date: 12/04/2019    Restless legs syndrome  Medication review and counseling done today with the patient and his wife with the help of a professional .  Gabapentin was not helpful and was causing some side effects and has been discontinued as detailed below.  On review his medications, it looks like he is not currently taking the Mirapex, this was prescribed as detailed below and he will follow-up as above.  -     pramipexole (MIRAPEX) 0.25 MG tablet; Take 1 tablet (0.25 mg total) by mouth at bedtime.  Dispense: 30 tablet; Refill: 11    Nicotine Dependence  He was congratulated on his cutting down to 2 cigarettes/day was counseled on a plan to continue this progression to quitting.        SUBJECTIVE: 49-year-old male whose had issues with chest pain over the last several months.  He is been in at the clinic and was treated for costochondritis.  He continues to get chest pain intermittently.  Moderate in severity with some radiation through to the back.  Pain is not made worse by physical exercise.  It usually occurs at rest.  Sometimes the pain improves with eating.  At times he will have some associated shortness of breath and lightheadedness with the pain.  Patient continues to struggle with his sleep and a sensation of restlessness and need to move his legs at nighttime.  This is quite disruptive to sleep and he feels fatigued and tired when he does not get a good night sleep.  He has  an associated paresthesia and pain in the legs.  Last visit we did lab work-up and he was started on gabapentin at bedtime, he noticed no improvement with the gabapentin and noticed some abdominal pain after taking gabapentin so he stopped taking the medication.    Past Medical History:   Diagnosis Date     GERD (gastroesophageal reflux disease)      Hepatitis     ETOH     Hyperlipidemia      Seizure (H)      Patient Active Problem List   Diagnosis     Nicotine Dependence     Hemorrhoids     Serum Enzyme Levels - AST (SGOT) Elevated     Limb Pain     Accessory Navicular     Lower Back Pain     Gastritis Due To H. Pylori     Abdominal Pain     Bone Cyst     Hypercholesteremia     Gastritis     Hypokalemia     Hyponatremia     Hematochezia     Maxillary sinusitis, acute     Insomnia     Alcoholic hepatitis     Benign adenomatous polyp of large intestine     Elevated liver enzymes     Oral leukoplakia     Subclinical hypothyroidism     Alcohol abuse     Nodule of upper lobe of left lung     Restless legs syndrome     Current Outpatient Medications   Medication Sig Dispense Refill     acetaminophen (TYLENOL) 325 MG tablet Take 2 tablets (650 mg total) by mouth every 6 (six) hours as needed for pain (takes 2 at a time). 90 tablet 2     aluminum-magnesium hydroxide-simethicone (MAALOX ADVANCED) 200-200-20 mg/5 mL Susp Take 30 mL by mouth every 4 (four) hours as needed. 800 mL 3     ibuprofen (ADVIL,MOTRIN) 600 MG tablet Take 1 tablet (600 mg total) by mouth every 6 (six) hours as needed for pain. 30 tablet 0     multivitamin therapeutic (THERAGRAN) tablet Take 1 tablet by mouth daily.  0     omeprazole (PRILOSEC) 20 MG capsule Take 1 capsule (20 mg total) by mouth daily. 30 capsule 11     cholecalciferol, vitamin D3, (VITAMIN D3) 2,000 unit Tab Take 1 tablet (2,000 Units total) by mouth daily. 90 tablet 3     melatonin 3 mg Tab tablet Take 1-3 tablets (3-9 mg total) by mouth at bedtime as needed. 90 tablet 6      "pramipexole (MIRAPEX) 0.25 MG tablet Take 1 tablet (0.25 mg total) by mouth at bedtime. 30 tablet 11     tamsulosin (FLOMAX) 0.4 mg cap Take 1 capsule (0.4 mg total) by mouth Daily after breakfast. 30 capsule 5     thiamine 50 MG tablet Take 1 tablet (50 mg total) by mouth daily.  0     traMADol (ULTRAM) 50 mg tablet Take 1 tablet (50 mg total) by mouth every 6 (six) hours as needed for pain. 12 tablet 0     No current facility-administered medications for this visit.      Social History     Tobacco Use   Smoking Status Current Every Day Smoker   Smokeless Tobacco Never Used   Tobacco Comment    Betelnut without Tobacco       OBJECTICE: BP 98/60 (Patient Site: Left Arm)   Pulse 92   Temp 99.4  F (37.4  C) (Oral)   Resp 20   Ht 5' 2\" (1.575 m)   Wt 168 lb (76.2 kg)   SpO2 99%   BMI 30.73 kg/m       No results found for this or any previous visit (from the past 24 hour(s)).     GEN-alert, appropriate, in no apparent distress   HEENT-mmm, sclera clear   CV-regular rate and rhythm with no murmur   RESP-lungs clear to auscultation   ABDOMINAL-soft, nontender, no palpable masses organomegaly   EXTREM-warm, no ankle edema, no calf tenderness   SKIN-no ulcers or vesicles      Boaz Bell          "

## 2021-06-04 NOTE — TELEPHONE ENCOUNTER
----- Message from Boaz Bell MD sent at 12/23/2019  1:49 PM CST -----  Please inform patient of good news on his stress test results normal.  He should quit smoking and be doing a regular daily walking exercise program with an eventual goal of getting 30 minutes of walking every day.

## 2021-06-04 NOTE — PROGRESS NOTES
ASSESMENT AND PLAN:  Diagnoses and all orders for this visit:    Chest pain, unspecified type  Unclear etiology.  Reviewed the results of his nondiagnostic stress test with the patient.  We discussed options with the help of a professional  and are going to proceed with a nuclear medicine stress test as detailed below.  We will call the patient with results when available.  -     NM Exercise Stress Test; Future; Expected date: 12/19/2019    Restless legs syndrome  Excellent improvement with Mirapex.  This will be continued at bedtime.    Vitamin D deficiency  Stable.  Refill vitamin D 2000 units daily and continue on that indefinitely.    Nicotine Dependence  Counseled the patient on a quit plan, counseled him on the extreme importance of quitting for his long-term cardiac and respiratory health.        SUBJECTIVE: 49-year-old male continues to get intermittent chest pain.  Please see previous notes for details.  Since last visit, he completed a treadmill stress test but was unable to exercise long enough to get a diagnostic test.  Therefore, the reading cardiologist recommended a stress test with imaging.  Patient reports that he continues to get mild to moderate chest pain, sometimes associated with shortness of breath.  Sometimes occurs with exertion and sometimes occurs more at rest.  Patient is doing a regular exercise routine, he walks or jogs slowly for up to 15 minutes/day, sometimes at the gym.  He has been cutting down on his smoking, currently at 2 cigarettes/day.  Since last visit, has been taking Mirapex every night at bedtime and has noticed significant improvement in his leg restlessness of his legs and improvement in his sleep.  He brings in an empty bottle of vitamin D, he is wondering about refills on this, he does have a past history of vitamin D deficiency and had been taking 2000 units/day.    Past Medical History:   Diagnosis Date     GERD (gastroesophageal reflux disease)       Hepatitis     ETOH     Hyperlipidemia      Seizure (H)      Patient Active Problem List   Diagnosis     Nicotine Dependence     Hemorrhoids     Serum Enzyme Levels - AST (SGOT) Elevated     Limb Pain     Accessory Navicular     Lower Back Pain     Gastritis Due To H. Pylori     Abdominal Pain     Bone Cyst     Hypercholesteremia     Gastritis     Hypokalemia     Hyponatremia     Hematochezia     Maxillary sinusitis, acute     Insomnia     Alcoholic hepatitis     Benign adenomatous polyp of large intestine     Elevated liver enzymes     Oral leukoplakia     Subclinical hypothyroidism     Alcohol abuse     Nodule of upper lobe of left lung     Restless legs syndrome     Current Outpatient Medications   Medication Sig Dispense Refill     acetaminophen (TYLENOL) 325 MG tablet Take 2 tablets (650 mg total) by mouth every 6 (six) hours as needed for pain (takes 2 at a time). 90 tablet 2     aluminum-magnesium hydroxide-simethicone (MAALOX ADVANCED) 200-200-20 mg/5 mL Susp Take 30 mL by mouth every 4 (four) hours as needed. 800 mL 3     cholecalciferol, vitamin D3, (VITAMIN D3) 2,000 unit Tab Take 1 tablet (2,000 Units total) by mouth daily. 90 tablet 3     ibuprofen (ADVIL,MOTRIN) 600 MG tablet Take 1 tablet (600 mg total) by mouth every 6 (six) hours as needed for pain. 30 tablet 0     melatonin 3 mg Tab tablet Take 1-3 tablets (3-9 mg total) by mouth at bedtime as needed. 90 tablet 6     multivitamin therapeutic (THERAGRAN) tablet Take 1 tablet by mouth daily.  0     omeprazole (PRILOSEC) 20 MG capsule Take 1 capsule (20 mg total) by mouth daily. 30 capsule 11     pramipexole (MIRAPEX) 0.25 MG tablet Take 1 tablet (0.25 mg total) by mouth at bedtime. 30 tablet 11     tamsulosin (FLOMAX) 0.4 mg cap Take 1 capsule (0.4 mg total) by mouth Daily after breakfast. 30 capsule 5     thiamine 50 MG tablet Take 1 tablet (50 mg total) by mouth daily.  0     traMADol (ULTRAM) 50 mg tablet Take 1 tablet (50 mg total) by mouth every  "6 (six) hours as needed for pain. 12 tablet 0     No current facility-administered medications for this visit.      Social History     Tobacco Use   Smoking Status Current Every Day Smoker   Smokeless Tobacco Never Used   Tobacco Comment    Betelnut without Tobacco       OBJECTICE: /50   Pulse 98   Temp 98.1  F (36.7  C) (Oral)   Resp 24   Ht 5' 2\" (1.575 m)   Wt 167 lb (75.8 kg)   SpO2 93%   BMI 30.54 kg/m       No results found for this or any previous visit (from the past 24 hour(s)).     GEN-alert, appropriate, in no apparent distress   HEENT-mucous membranes are moist, sclera are clear   CV-regular rate and rhythm with no murmur   RESP-lungs clear to auscultation   ABDOMINAL-soft and nontender   EXTREM-warm, no edema, no calf tenderness   SKIN-no ulcers or vesicles      Boaz Bell          "

## 2021-06-05 VITALS
HEART RATE: 92 BPM | DIASTOLIC BLOOD PRESSURE: 60 MMHG | SYSTOLIC BLOOD PRESSURE: 120 MMHG | RESPIRATION RATE: 24 BRPM | WEIGHT: 171 LBS | OXYGEN SATURATION: 92 % | BODY MASS INDEX: 31.47 KG/M2 | HEIGHT: 62 IN | TEMPERATURE: 97.9 F

## 2021-06-05 VITALS
OXYGEN SATURATION: 99 % | HEIGHT: 62 IN | BODY MASS INDEX: 31.1 KG/M2 | TEMPERATURE: 98 F | HEART RATE: 92 BPM | SYSTOLIC BLOOD PRESSURE: 130 MMHG | WEIGHT: 169 LBS | RESPIRATION RATE: 16 BRPM | DIASTOLIC BLOOD PRESSURE: 62 MMHG

## 2021-06-06 NOTE — TELEPHONE ENCOUNTER
Refill Approved    Rx renewed per Medication Renewal Policy. Medication was last renewed on 2/9/2019.    Bunny [facility], [facility] Triage/Med Refill 2/16/2020     Requested Prescriptions   Pending Prescriptions Disp Refills     omeprazole (PRILOSEC) 20 MG capsule [Pharmacy Med Name: OMEPRAZOLE DR 20 MG CAPSULE] 30 capsule 0     Sig: TAKE ONE CAPSULE BY MOUTH DAILY       GI Medications Refill Protocol Passed - 2/12/2020 10:57 AM        Passed - PCP or prescribing provider visit in last 12 or next 3 months.     Last office visit with prescriber/PCP: 12/19/2019 Boaz Bell MD OR same dept: 12/19/2019 Boaz Bell MD OR same specialty: 12/19/2019 Boaz Bell MD  Last physical: 9/7/2017 Last MTM visit: Visit date not found   Next visit within 3 mo: Visit date not found  Next physical within 3 mo: Visit date not found  Prescriber OR PCP: Boaz Bell MD  Last diagnosis associated with med order: 1. Gastritis Due To H. Pylori  - omeprazole (PRILOSEC) 20 MG capsule [Pharmacy Med Name: OMEPRAZOLE DR 20 MG CAPSULE]; TAKE ONE CAPSULE BY MOUTH DAILY  Dispense: 30 capsule; Refill: 0    If protocol passes may refill for 12 months if within 3 months of last provider visit (or a total of 15 months).
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
## 2021-06-10 NOTE — PROGRESS NOTES
ASSESMENT AND PLAN:  Diagnoses and all orders for this visit:    Insomnia, unspecified type  -     traZODone (DESYREL) 50 MG tablet; Take 1 tablet (50 mg total) by mouth at bedtime. May repeat in 1 hour if not asleep, max dose 100mg qhs.  Dispense: 60 tablet; Refill: 11  Excellent response to the 100 mg nightly dose, continue this as refilled above.    Alcoholic hepatitis without ascites  Congratulated on his 1 month of sobriety.  Reviewed the most recent gastroenterology consultation which showed a negative liver ultrasound and fibro-scan testing which showed some scarring but no cirrhosis.  Counseled him on all of this today and the extreme importance of continued abstinence from alcohol moving forward.  He has his endoscopy tests coming up later this month.        SUBJECTIVE: 47-year-old male comes in today for follow-up.  He is sleeping much better with taking trazodone 2 pills at bedtime.  He overall is feeling very good about the future.  He has not used any alcohol in the past 1 month.  He feels his mood and energy level are good.  He feels confident in his sobriety.  Some questions about the liver testing that was done by his specialist.    Past Medical History:   Diagnosis Date     GERD (gastroesophageal reflux disease)      Hepatitis     ETOH     Hyperlipidemia      Seizure      Patient Active Problem List   Diagnosis     Nicotine Dependence     Hemorrhoids     Serum Enzyme Levels - AST (SGOT) Elevated     Limb Pain     Accessory Navicular     Lower Back Pain     Gastritis Due To H. Pylori     Abdominal Pain     Bone Cyst     Hypercholesteremia     Gastritis     Alcohol withdrawal seizure     Hypokalemia     Hyponatremia     Thrombocytopenia     Delirium tremens     Hematochezia     Maxillary sinusitis, acute     Insomnia     Alcoholic hepatitis     Current Outpatient Prescriptions   Medication Sig Dispense Refill     multivitamin therapeutic (THERAGRAN) tablet Take 1 tablet by mouth daily.  0     thiamine  "50 MG tablet Take 1 tablet (50 mg total) by mouth daily.  0     gabapentin (NEURONTIN) 300 MG capsule Take 1 capsule (300 mg total) by mouth at bedtime. 30 capsule 0     nicotine (NICODERM CQ) 14 mg/24 hr Place 1 patch on the skin daily. 28 patch 0     omeprazole (PRILOSEC) 20 MG capsule Take 1 capsule (20 mg total) by mouth Daily before breakfast. 30 capsule 0     traZODone (DESYREL) 50 MG tablet Take 1 tablet (50 mg total) by mouth at bedtime. May repeat in 1 hour if not asleep, max dose 100mg qhs. 60 tablet 11     No current facility-administered medications for this visit.      History   Smoking Status     Current Every Day Smoker   Smokeless Tobacco     Never Used     Comment: danita VERNONICE: /88 (Patient Site: Left Arm, Patient Position: Sitting, Cuff Size: Adult Regular)  Pulse 88  Temp 98.3  F (36.8  C) (Oral)   Resp 28  Ht 5' 3\" (1.6 m)  Wt 143 lb (64.9 kg)  BMI 25.33 kg/m2     No results found for this or any previous visit (from the past 24 hour(s)).     GEN-alert, appropriate, no apparent distress   HEENT-sclera are clear without jaundice   CV-regular rate and rhythm with no murmur   RESP-lungs clear to auscultation   ABDOMINAL-soft and nontender with no palpable masses   EXTREM-warm with no edema   SKIN-no jaundice or rash      Boaz Bell          "

## 2021-06-10 NOTE — PROGRESS NOTES
ASSESMENT AND PLAN:  Diagnoses and all orders for this visit:    Alcohol withdrawal  Reviewed hospital discharge summary and hospital record today with the patient and his family with the help of a professional .  Medication reconciliation and review and counseling done.  Because of his significant itching symptoms after taking folic acid, this is going to be discontinued.  Extensive counseling with the patient around the extreme importance of continuing with complete abstinence from alcohol use, he is confident that he will be able to maintain this on his own and I offered him a variety of treatment options but the patient is not interested at this time.  We'll have him follow-up closely here in the clinic, next appointment in 2 weeks with me, sooner if problems.    Insomnia, unspecified type  One of the reasons he was using alcohol heavily was to try to get to sleep.  Counseling done today on options.  Were going to increase trazodone from his 1 tablet per day at bedtime up to 2 tablets at bedtime.  Also, if the itching problem can be resolved that will help his sleep, if the itching does not resolve with discontinuation of the folic acid, will restart folic acid and add diphenhydramine at bedtime.    Hematochezia  Reviewed lab results from the hospital, Hemoccult stool test positive, reviewed gastroenterology consultation, colonoscopy was recommended.  Our clinic staff is helping coordinate that with the GI clinic.  He has reviewed the [place] language colonoscopy video.    Probable alcoholic hepatitis  Reviewed his ultrasound results, some scarring of the liver.  Reviewed previous workup included negative hepatitis A, B, and C testing.  Reviewed the improvement trend in his liver enzymes since discontinuation of alcohol.  Counseled on the extreme importance of alcohol abstinence as above.  Follow-up with gastroenterology.    SUBJECTIVE: 46-year-old male who is here for his hospital follow-up appointment  after being discharged from the hospital after having severe alcohol withdrawal, probable alcoholic hepatitis, and withdrawal seizure.  Since discharge, there has been no seizure.  He has not had any alcohol use since discharge.  Patient has had diffuse intense itching without rash.  He notices that it occurs after he takes his folic acid tablet.  No hives or shortness of breath.  Patient has had trouble sleeping.  Trazodone was started at 50 mg nightly during his hospitalization and he does continue to take it with some slight improvement in his sleep.  However, he still really struggles to get to sleep at bedtime.  He reports that he's had trouble sleeping for over a year.  He reports one of the reasons he was using alcohol heavily was to try to help him fall asleep.  Patient is a [place] refugee from [facility], seen today with the help of a professional .    Past Medical History:   Diagnosis Date     GERD (gastroesophageal reflux disease)      Hepatitis     ETOH     Hyperlipidemia      Seizure      Patient Active Problem List   Diagnosis     Nicotine Dependence     Hemorrhoids     Serum Enzyme Levels - AST (SGOT) Elevated     Limb Pain     Accessory Navicular     Lower Back Pain     Gastritis Due To H. Pylori     Abdominal Pain     Bone Cyst     Hypercholesteremia     Gastritis     Alcohol withdrawal seizure     Hypokalemia     Hyponatremia     Thrombocytopenia     Delirium tremens     Hematochezia     Maxillary sinusitis, acute     Insomnia     Current Outpatient Prescriptions   Medication Sig Dispense Refill     amoxicillin-clavulanate (AUGMENTIN) 875-125 mg per tablet Take 1 tablet by mouth 2 (two) times a day with meals for 10 days. 14 tablet 0     gabapentin (NEURONTIN) 300 MG capsule Take 1 capsule (300 mg total) by mouth at bedtime. 30 capsule 0     omeprazole (PRILOSEC) 20 MG capsule Take 1 capsule (20 mg total) by mouth Daily before breakfast. 30 capsule 0     traZODone (DESYREL) 50 MG tablet Take 1  "tablet (50 mg total) by mouth at bedtime. May repeat in 1 hour if not asleep, max dose 100mg qhs. 60 tablet 0     multivitamin therapeutic (THERAGRAN) tablet Take 1 tablet by mouth daily.  0     nicotine (NICODERM CQ) 14 mg/24 hr Place 1 patch on the skin daily. 28 patch 0     thiamine 50 MG tablet Take 1 tablet (50 mg total) by mouth daily.  0     No current facility-administered medications for this visit.      History   Smoking Status     Current Every Day Smoker   Smokeless Tobacco     Never Used     Comment: betel na       OBJECTICE: BP (!) 84/60 (Patient Site: Left Arm, Patient Position: Sitting, Cuff Size: Adult Regular)  Pulse 79  Temp 98.3  F (36.8  C) (Oral)   Resp 20  Ht 5' 3\" (1.6 m)  Wt 137 lb (62.1 kg)  SpO2 98%  BMI 24.27 kg/m2     No results found for this or any previous visit (from the past 24 hour(s)).     GEN-alert, appropriate, in no apparent distress   HEENT-sclera show mild jaundice, mucous membranes are moist   CV- regular rate and rhythm with no murmur   RESP-lungs clear to auscultation   ABDOMINAL-soft, mild to moderate tenderness to palpation of his right upper quadrant   EXTREM-warm with no edema   SKIN-no rash   Psychiatric-appearance is well groomed, speech of normal fluency and rate, mood is not depressed, affect is normal, without content negative for suicidal or homicidal ideation, thought processing negative for paranoid or delusional thinking.  Judgment and insight are intact.      Boaz Bell"

## 2021-06-12 NOTE — PROGRESS NOTES
ASSESMENT AND PLAN:    Physical, Health Maitenence -   Reviewed healthy lifestyle, diet, exercise, vitamins, and follow-up plan today with patient.  Reviewed age appropriate cancer and other screening recommendations.  Immunization review and update done.  Reviewed indicated lab tests, see lab orders.   Smoking cessation counseling done today with the patient.  He was congratulated on his ongoing sobriety.    Benign adenomatous polyp of large intestine  Reviewed his colonoscopy report, he will be due in 5 years from last colonoscopy.    Elevated liver enzymes  FU hepatology every 6 months.    Urinary frequency  Probable benign prostatic hypertrophy.  Checking PSA as ordered below.  At this point, the patient does not feel his symptoms are bothersome enough to warrant a daily medication.  Follow-up if worsening.  -     PSA (Prostatic-Specific Antigen), Diagnostic    Oral leukoplakia  Discussed options today with the patient.  Observation.  He will recheck here in the clinic in 6 months and also will have his dentist check the area.        HPI: 47-year-old male here for his physical.  Since last visit, the swelling in his ankles has resolved.  He continues to have frequent urination when he lays down to go to sleep at night.  He usually has to get up a few times to urinate before he falls asleep but then once he falls asleep he does not awaken to urinate for the rest of the night.  No dysuria or gross hematuria.    ROS: No chest pain or shortness of breath, no blood in the urine or blood in the stool, remainder of review of systems is as above or negative.    Past Medical History:   Diagnosis Date     GERD (gastroesophageal reflux disease)      Hepatitis     ETOH     Hyperlipidemia      Seizure        Current Outpatient Prescriptions   Medication Sig Dispense Refill     multivitamin therapeutic (THERAGRAN) tablet Take 1 tablet by mouth daily.  0     omeprazole (PRILOSEC) 20 MG capsule TAKE ONE CAPSULE BY MOUTH DAILY  "BEFORE BREAKFAST. 30 capsule 1     triamcinolone (KENALOG) 0.1 % cream Apply topically 2 (two) times a day as needed. 80 g 1     gabapentin (NEURONTIN) 300 MG capsule Take 1 capsule (300 mg total) by mouth at bedtime. 30 capsule 0     thiamine 50 MG tablet Take 1 tablet (50 mg total) by mouth daily.  0     No current facility-administered medications for this visit.        Patient Active Problem List   Diagnosis     Nicotine Dependence     Hemorrhoids     Serum Enzyme Levels - AST (SGOT) Elevated     Limb Pain     Accessory Navicular     Lower Back Pain     Gastritis Due To H. Pylori     Abdominal Pain     Bone Cyst     Hypercholesteremia     Gastritis     Alcohol withdrawal seizure     Hypokalemia     Hyponatremia     Thrombocytopenia     Delirium tremens     Hematochezia     Maxillary sinusitis, acute     Insomnia     Alcoholic hepatitis     Benign adenomatous polyp of large intestine     Elevated liver enzymes     Oral leukoplakia       Social History     Social History     Marital status:      Spouse name: N/A     Number of children: N/A     Years of education: N/A     Social History Main Topics     Smoking status: Current Every Day Smoker     Smokeless tobacco: Never Used      Comment: betel na     Alcohol use No      Comment: no longer drinking since 4/1/2017     Drug use: No     Sexual activity: Yes     Partners: Female     Other Topics Concern     None     Social History Narrative       History   Smoking Status     Current Every Day Smoker   Smokeless Tobacco     Never Used     Comment: betel na       OBJECTICE: /88  Pulse 70  Temp 98.2  F (36.8  C) (Oral)   Resp 22  Ht 5' 3\" (1.6 m)  Wt 172 lb 4 oz (78.1 kg)  BMI 30.51 kg/m2      Gen - alert, orientated, NAD  Eyes - fundascopic exam limited by the undialated pupil but looks symmetric  ENT -poor dentition, there is a patch of leukoplakia on his right buccal mucosa, no induration or ulceration, TMs clear  Neck - supple, no palpable mass or " lymphadenopathy  CV - RRR, no murmur  Resp - lungs CTA  Ab - soft, nontender, no palpable mass or organomegaly   - normal appearance to the external genetalia, normal testicular exam bilaterally, no hernia  Rectal - normal tone, no palpable mass or prostate nodule, palpable portion of prostate shows some enlargement  Extrem - warm, no edema  Neuro - CN II-XII intact, strength, sensation, reflexes intact and symmetric  Skin - no rash, no atypical appearing lesions seen.     Boaz Bell   9:45 AM 9/7/2017

## 2021-06-12 NOTE — PROGRESS NOTES
ASSESMENT AND PLAN:  Diagnoses and all orders for this visit:    Edema  Reviewed his most recent blood test results from April which showed normal creatinine.  Rechecking a urinalysis as below.  Most likely it is just some dependent edema and he was instructed on elevation of the legs and indications for follow-up.  I did also recommended the patient follow-up for a full health maintenance physical sometime in the next couple of months.    Urinary frequency  -     Urinalysis-UC if Indicated done today is normal.  Discussed differential diagnosis today with the patient.  He had negative diabetes testing done back in April.  Prostate would be in the differential diagnosis.  He is going to come back sometime in the next 1-2 months for a full physical and will discuss prostate exam and possibly PSA further at that visit.    Dermatitis  -     triamcinolone (KENALOG) 0.1 % cream; Apply topically 2 (two) times a day as needed.  Dispense: 80 g; Refill: 1  Reviewed the risks and benefits of the medication, follow-up if not getting good improvement over the next 3 weeks.    Insomnia, unspecified type  This has resolved and he is no longer taking trazodone.  Therefore, trazodone removed from his medication list.        SUBJECTIVE: 47-year-old male comes in today with concerns about a itchy rash on his right foot.  It started a couple of months ago with some itching and redness over the lateral malleolus.  Prior to that, he had some intermittent rash over that foot that had come and gone over this past year.  Then over the last few weeks the itching and redness has spread around the foot and become more bothersome to the patient.  No open sores or ulcers.  Patient has a recent history, see notes for details from previous notes.  He has not used any alcohol since his last visit with me here in the clinic.  He is sleeping much better and no longer requires the trazodone to get to sleep.  Patient also reports some mild puffiness  "of his ankles after he has been on his feet for prolonged period of time.  At his job he needs to be on his feet a lot and by the end of the day he notices some puffiness in the ankles.  No shortness of breath or chest pain.  He has also noticed that he has to urinate more frequently with this change occurring over the last several months.  No pain or burning with urination, no gross hematuria.    Past Medical History:   Diagnosis Date     GERD (gastroesophageal reflux disease)      Hepatitis     ETOH     Hyperlipidemia      Seizure      Patient Active Problem List   Diagnosis     Nicotine Dependence     Hemorrhoids     Serum Enzyme Levels - AST (SGOT) Elevated     Limb Pain     Accessory Navicular     Lower Back Pain     Gastritis Due To H. Pylori     Abdominal Pain     Bone Cyst     Hypercholesteremia     Gastritis     Alcohol withdrawal seizure     Hypokalemia     Hyponatremia     Thrombocytopenia     Delirium tremens     Hematochezia     Maxillary sinusitis, acute     Insomnia     Alcoholic hepatitis     Current Outpatient Prescriptions   Medication Sig Dispense Refill     multivitamin therapeutic (THERAGRAN) tablet Take 1 tablet by mouth daily.  0     thiamine 50 MG tablet Take 1 tablet (50 mg total) by mouth daily.  0     gabapentin (NEURONTIN) 300 MG capsule Take 1 capsule (300 mg total) by mouth at bedtime. 30 capsule 0     omeprazole (PRILOSEC) 20 MG capsule TAKE ONE CAPSULE BY MOUTH DAILY BEFORE BREAKFAST. 30 capsule 1     triamcinolone (KENALOG) 0.1 % cream Apply topically 2 (two) times a day as needed. 80 g 1     No current facility-administered medications for this visit.      History   Smoking Status     Current Every Day Smoker   Smokeless Tobacco     Never Used     Comment: danita esposito       OBJECTICE: /80 (Patient Site: Left Arm, Patient Position: Sitting, Cuff Size: Adult Regular)  Pulse 68  Temp 97.8  F (36.6  C) (Oral)   Resp 20  Ht 5' 3\" (1.6 m)  Wt 170 lb (77.1 kg)  BMI 30.11 kg/m2 "     Recent Results (from the past 24 hour(s))   Urinalysis-UC if Indicated    Collection Time: 08/01/17  8:51 AM   Result Value Ref Range    Color, UA Yellow Colorless, Yellow, Straw, Light Yellow    Clarity, UA Clear Clear    Glucose, UA Negative Negative    Bilirubin, UA Negative Negative    Ketones, UA Negative Negative    Specific Gravity, UA <=1.005 1.005 - 1.030    Blood, UA Negative Negative    pH, UA 6.5 5.0 - 8.0    Protein, UA Negative Negative mg/dL    Urobilinogen, UA 0.2 E.U./dL 0.2 E.U./dL, 1.0 E.U./dL    Nitrite, UA Negative Negative    Leukocytes, UA Negative Negative        GEN-alert, appropriate, in no apparent distress   HEENT-mucous membranes are moist, sclera are clear   CV-regular rate and rhythm with no murmur   RESP-lungs clear to auscultation   ABDOMINAL-soft, nontender, no palpable masses   EXTREM-warm with trace pitting edema bilateral ankles   SKIN-macular patchy rash over the foot and ankle, erythematous, some scaling      Boaz Bell

## 2021-06-13 NOTE — PROGRESS NOTES
ASSESMENT AND PLAN:  Diagnoses and all orders for this visit:    Probable sleep apnea  Counseling done today with the patient and his wife with the help of a professional .  -     Ambulatory referral to Sleep Medicine    Headache  Likely musculoskeletal posterior neck trigger.  Will treat with naproxen as ordered below.  Reviewed the risks and benefits of the medication.  We also reviewed his recent MRI of the brain which was negative but did show chronic sinus findings which will be addressed as below under chronic rhinitis.  -     naproxen (NAPROSYN) 500 MG tablet; Take 1 tablet (500 mg total) by mouth every 12 (twelve) hours as needed (with food).  Dispense: 60 tablet; Refill: 2    Chronic rhinitis  -     fluticasone (FLONASE) 50 mcg/actuation nasal spray; 1 spray into each nostril daily.  Dispense: 16 g; Refill: 6    Gastritis, presence of bleeding unspecified, unspecified chronicity, unspecified gastritis type  -     omeprazole (PRILOSEC) 20 MG capsule; Take 1 capsule (20 mg total) by mouth daily.  Dispense: 30 capsule; Refill: 6    Need for immunization against influenza  -     Influenza, Seasonal,Quad Inj, 36+ MOS          SUBJECTIVE: 47-year-old male comes in with concern about feeling a sense of dizziness or lightheadedness over the last several weeks.  At times he will get a mild to moderate aching pain in the neck from the shoulders bilaterally up to the posterior head.  He has associated tightness in the muscles.  He has not been taking any medication for that.  Patient has a history of alcoholic liver disease and fatty liver.  He will be due for follow-up with the hepatology clinic in January.  He has not used any alcohol since his last visit.  His wife is concerned because she notices that when he is sleeping that he will snore and then pausing his breathing and then start breathing again.  Patient reports that his sleep at times is nonrestorative and that there are times when he finds  himself nodding off during the daytime.  He also has some mild pressure sensation that he feels around the eyes and maxillary area bilaterally.  Patient previously had had an MRI done back in the spring which was negative for the brain but did show some significant inflammatory changes in the sinuses.    Past Medical History:   Diagnosis Date     GERD (gastroesophageal reflux disease)      Hepatitis     ETOH     Hyperlipidemia      Seizure      Patient Active Problem List   Diagnosis     Nicotine Dependence     Hemorrhoids     Serum Enzyme Levels - AST (SGOT) Elevated     Limb Pain     Accessory Navicular     Lower Back Pain     Gastritis Due To H. Pylori     Abdominal Pain     Bone Cyst     Hypercholesteremia     Gastritis     Alcohol withdrawal seizure     Hypokalemia     Hyponatremia     Thrombocytopenia     Delirium tremens     Hematochezia     Maxillary sinusitis, acute     Insomnia     Alcoholic hepatitis     Benign adenomatous polyp of large intestine     Elevated liver enzymes     Oral leukoplakia     Current Outpatient Prescriptions   Medication Sig Dispense Refill     omeprazole (PRILOSEC) 20 MG capsule Take 1 capsule (20 mg total) by mouth daily. 30 capsule 6     triamcinolone (KENALOG) 0.1 % cream Apply topically 2 (two) times a day as needed. 80 g 1     fluticasone (FLONASE) 50 mcg/actuation nasal spray 1 spray into each nostril daily. 16 g 6     multivitamin therapeutic (THERAGRAN) tablet Take 1 tablet by mouth daily.  0     naproxen (NAPROSYN) 500 MG tablet Take 1 tablet (500 mg total) by mouth every 12 (twelve) hours as needed (with food). 60 tablet 2     thiamine 50 MG tablet Take 1 tablet (50 mg total) by mouth daily.  0     No current facility-administered medications for this visit.      History   Smoking Status     Current Every Day Smoker   Smokeless Tobacco     Never Used     Comment: [employer] without Tobacco       OBJECTICE: /74 (Patient Site: Left Arm, Patient Position: Sitting, Cuff  "Size: Adult Large)  Pulse 76  Temp 98.1  F (36.7  C) (Oral)   Resp 16  Ht 5' 3\" (1.6 m)  Wt 170 lb (77.1 kg)  BMI 30.11 kg/m2     No results found for this or any previous visit (from the past 24 hour(s)).     GEN-alert, appropriate, in no apparent distress   HEENT-nasopharynx shows puffy red nasal mucosa bilaterally, narrowing of the nasal passages   CV-regular rate and rhythm with no murmur   RESP-lungs clear to auscultation   Musculoskeletal-some tenderness and tightness to palpation of his trapezius muscles bilaterally   EXTREM-warm with no edema   SKIN-no vesicles or ulcers   Neurologic-cranial nerves II through XII intact, strength and sensation are intact and symmetric.      Boaz Bell          "

## 2021-06-14 ENCOUNTER — RECORDS - HEALTHEAST (OUTPATIENT)
Dept: ADMINISTRATIVE | Facility: OTHER | Age: 51
End: 2021-06-14

## 2021-06-14 NOTE — PROGRESS NOTES
Dear Dr. Boaz Bell Md  [address] [place] [address]    Thank you for the opportunity to participate in the care of Mr. Salas Clinton.    He is a 47 y.o. male who comes to the clinic for evaluation of excessive daytime sleepiness that is been going on for about a year.  The patient has been informed by his spouse that he has had significant pauses in his breathing during sleep followed by loud snoring.  The patient also feels tired no matter how long he sleeps.  The patient's review of systems is otherwise unremarkable.     Past Medical History  Past Medical History:   Diagnosis Date     GERD (gastroesophageal reflux disease)      Hepatitis     ETOH     Hyperlipidemia      Seizure         Past Surgical History  Past Surgical History:   Procedure Laterality Date     FOOT SURGERY Right         Meds  Current Outpatient Prescriptions   Medication Sig Dispense Refill     fluticasone (FLONASE) 50 mcg/actuation nasal spray 1 spray into each nostril daily. 16 g 6     multivitamin therapeutic (THERAGRAN) tablet Take 1 tablet by mouth daily.  0     omeprazole (PRILOSEC) 20 MG capsule Take 1 capsule (20 mg total) by mouth daily. 30 capsule 6     thiamine 50 MG tablet Take 1 tablet (50 mg total) by mouth daily.  0     triamcinolone (KENALOG) 0.1 % cream Apply topically 2 (two) times a day as needed. 80 g 1     No current facility-administered medications for this visit.         Allergies  Folic acid     Social History  Social History     Social History     Marital status:      Spouse name: N/A     Number of children: N/A     Years of education: N/A     Occupational History     Not on file.     Social History Main Topics     Smoking status: Current Every Day Smoker     Smokeless tobacco: Never Used      Comment: Betelnut without Tobacco     Alcohol use No      Comment: no longer drinking since 4/1/2017     Drug use: No     Sexual activity: Yes     Partners: Female     Other Topics Concern     Not on file      Social History Narrative        Family History  No family history on file.     Review of Systems:  Constitutional: Negative except as noted in HPI.   Eyes: Negative except as noted in HPI.   ENT: Negative except as noted in HPI.   Cardiovascular: Negative except as noted in HPI.   Respiratory: Negative except as noted in HPI.   Gastrointestinal: Negative except as noted in HPI.   Genitourinary: Negative except as noted in HPI.   Musculoskeletal: Negative except as noted in HPI.   Integumentary: Negative except as noted in HPI.   Neurological: Negative except as noted in HPI.   Psychiatric: Negative except as noted in HPI.   Endocrine: Negative except as noted in HPI.   Hematologic/Lymphatic: Negative except as noted in HPI.      STOP BANG 11/13/2017   Do you snore loudly (louder than talking or loud enough to be heard through closed doors)? 1   Do you often feel tired, fatigued, or sleepy during daytime? 1   Has anyone observed you stop breathing in your sleep? 1   Do you have or are you being treated for high blood pressure? 0   BMI more than 35 kg/m2 0   Age over 50 years old? 0   Neck circumference greater than 16 inches? 0   Gender male? 1   Total Score 4   Epworths Sleepiness Scale 11/13/2017   Sitting and reading 3   Watching TV 3   Sitting, inactive in a public place (e.g. a theatre or a meeting) 3   As a passenger in a car for an hour without a break 3   Lying down to rest in the afternoon when circumstances permit 3   Sitting and talking to someone 0   Sitting quietly after a lunch without alcohol 3   In a car, while stopped for a few minutes in traffic 0   Total score 18   Rooming 11/13/2017   Usual bedtime 11PM   Sleep Latency 30 minutes   Awakenings one nocturnal   Wake Up Time 7AM   Weekends Same   Energy Drinks No   Coffee Yes   Cola No   Difficulty falling asleep Yes   Difficulty staying asleep No   Excessive daytime tiredness Yes   Excessive daytime sleepiness Yes   Dozing off while driving No  "  Shift Worker No   Sleep Walking? No   Sleep Talking? No   Kicking or punching? No   Restless legs symptoms No       Physical Exam:  /82  Pulse 78  Ht 5' 3\" (1.6 m)  Wt 172 lb 3.2 oz (78.1 kg)  SpO2 97%  BMI 30.5 kg/m2  BMI:Body mass index is 30.5 kg/(m^2).   GEN: NAD, obese  Head: Normocephalic.  EYES: PERRLA, EOMI  ENT: Oropharynx is clear, mallampatti class 4+ airway.   Nasal mucosa is moist without erythema  Neck : Thyroid is within normal limits. Neck circ 15.25\"  CV: Regular rate and rhythm, S1 & S2 positive.  LUNGS: Bilateral breathsounds heard.   ABDOMEN: Positive bowel sounds in all quadrants, soft, no rebound or guarding  MUSCULOSKELETAL: No leg swelling  SKIN: warm, dry, no rashes  Neurological: Alert, oriented to time, place, and person.  Psych: normal mood, normal affect     Labs/Studies:     Lab Results   Component Value Date    WBC 3.9 (L) 04/09/2017    HGB 11.0 (L) 04/09/2017    HCT 34.3 (L) 04/09/2017     (H) 04/09/2017     04/09/2017         Chemistry        Component Value Date/Time     04/09/2017 0640    K 4.0 04/09/2017 0640     (H) 04/09/2017 0640    CO2 22 04/09/2017 0640    BUN <2 (L) 04/09/2017 0640    CREATININE 0.79 04/09/2017 0640    GLU 85 04/09/2017 0640        Component Value Date/Time    CALCIUM 9.1 04/09/2017 0640    ALKPHOS 96 04/08/2017 0606    AST 56 (H) 04/08/2017 0606    ALT 29 04/08/2017 0606    BILITOT 2.4 (H) 04/08/2017 0606            No results found for: FERRITIN  Lab Results   Component Value Date    TSH 8.57 (H) 04/05/2017         Assessment and Plan:  In summary Salas Clinton is a 47 y.o. year old male here for sleep disturbance.  1.  Suspected sleep apnea   Mr. Salas Clinton has high risk for obstructive sleep apnea based on the history of witnessed apnea, snoring and a crowded airway. I educated the patient on the underlying pathophysiology of obstructive sleep apnea. We reviewed the risks associated with sleep apnea, including increased " cardiovascular risk and overall death. I recommend getting an split-night nocturnal polysomnography. The patient should return to the clinic to discuss results and treatment option in a patient-centered approach.  The patient declined my offer to proceed with a sleep study at this point in time because he felt that if he underwent some weight loss that his symptoms would improve.  I advised the patient to call us if he changes his mind.  2.  Hypersomnia  3.  Snoring  4.  Other sleep disturbance    Patient verbalized understanding of these issues, agrees with the plan and all questions were answered today. Patient was given an opportuntity to voice any other symptoms or concerns not listed above. Patient did not have any other symptoms or concerns.      Patient told to return in one week after the sleep study is interpreted. Patient instructed to stop at  to schedule appointment before leaving today.       Ronnie Ng DO  Board Certified in Internal Medicine and Sleep Medicine  [facility].    (Note created with Dragon voice recognition and unintended spelling errors and word substitutions may occur)     All the information was obtained through the help of the interpretor.

## 2021-06-14 NOTE — PROGRESS NOTES
ASSESMENT AND PLAN:  Diagnoses and all orders for this visit:    Sore throat  Likely secondary to uncontrolled reflux.  Restart omeprazole.  Follow-up with me in the clinic within the next month for follow-up on his other ongoing chronic issues.      Reviewed the risks and benefits of the treatment plan with the patient and/or caregiver and we discussed indications for routine and emergent follow-up.        SUBJECTIVE: 50-year-old male has had burning pain in the throat over the last several weeks.  No fevers.  No chest pain or shortness of breath.  He has a history of reflux but has not been taking his omeprazole recently.  He does note that the burning in the throat started a few weeks after stopping the omeprazole.    Past Medical History:   Diagnosis Date     GERD (gastroesophageal reflux disease)      Hepatitis     ETOH     Hyperlipidemia      Seizure (H)      Patient Active Problem List   Diagnosis     Nicotine Dependence     Hemorrhoids     Serum Enzyme Levels - AST (SGOT) Elevated     Limb Pain     Accessory Navicular     Lower Back Pain     Gastritis Due To H. Pylori     Abdominal Pain     Bone Cyst     Hypercholesteremia     Gastritis     Hypokalemia     Hyponatremia     Hematochezia     Maxillary sinusitis, acute     Insomnia     Alcoholic hepatitis     Benign adenomatous polyp of large intestine     Elevated liver enzymes     Oral leukoplakia     Subclinical hypothyroidism     Alcohol abuse     Nodule of upper lobe of left lung     Restless legs syndrome     Current Outpatient Medications   Medication Sig Dispense Refill     acetaminophen (TYLENOL) 325 MG tablet Take 2 tablets (650 mg total) by mouth every 6 (six) hours as needed for pain (takes 2 at a time). 90 tablet 2     aluminum-magnesium hydroxide-simethicone (MAALOX ADVANCED) 200-200-20 mg/5 mL Susp Take 30 mL by mouth every 4 (four) hours as needed. 800 mL 3     cholecalciferol, vitamin D3, (VITAMIN D3) 2,000 unit Tab Take 1 tablet (2,000 Units  "total) by mouth daily. 90 tablet 3     ibuprofen (ADVIL,MOTRIN) 600 MG tablet Take 1 tablet (600 mg total) by mouth every 6 (six) hours as needed for pain. 30 tablet 0     melatonin 3 mg Tab tablet Take 1-3 tablets (3-9 mg total) by mouth at bedtime as needed. 90 tablet 6     multivitamin therapeutic (THERAGRAN) tablet Take 1 tablet by mouth daily.  0     omeprazole (PRILOSEC) 20 MG capsule Take 1 capsule (20 mg total) by mouth daily before breakfast. 30 capsule 11     pramipexole (MIRAPEX) 0.25 MG tablet Take 1 tablet (0.25 mg total) by mouth at bedtime. 30 tablet 11     tamsulosin (FLOMAX) 0.4 mg cap Take 1 capsule (0.4 mg total) by mouth Daily after breakfast. 30 capsule 5     thiamine 50 MG tablet Take 1 tablet (50 mg total) by mouth daily.  0     traMADol (ULTRAM) 50 mg tablet Take 1 tablet (50 mg total) by mouth every 6 (six) hours as needed for pain. 12 tablet 0     No current facility-administered medications for this visit.      Social History     Tobacco Use   Smoking Status Current Every Day Smoker   Smokeless Tobacco Never Used   Tobacco Comment    Betelnut without Tobacco       OBJECTICE: /62 (Patient Site: Left Arm)   Pulse 92   Temp 98  F (36.7  C) (Oral)   Resp 16   Ht 5' 2\" (1.575 m)   Wt 169 lb (76.7 kg)   SpO2 99%   BMI 30.91 kg/m       No results found for this or any previous visit (from the past 24 hour(s)).     GEN-alert, appropriate, in no apparent distress   HEENT-oropharynx looks clear, no exudate, neck is supple without palpable mass or lymphadenopathy.   CV-regular rate and rhythm   RESP-lungs clear to auscultation       Boaz Bell          "

## 2021-06-15 PROBLEM — J01.00 MAXILLARY SINUSITIS, ACUTE: Status: ACTIVE | Noted: 2017-04-09

## 2021-06-15 PROBLEM — K70.10 ALCOHOLIC HEPATITIS (H): Status: ACTIVE | Noted: 2017-04-12

## 2021-06-15 PROBLEM — G47.00 INSOMNIA: Status: ACTIVE | Noted: 2017-04-09

## 2021-06-15 PROBLEM — E87.6 HYPOKALEMIA: Status: ACTIVE | Noted: 2017-04-05

## 2021-06-15 PROBLEM — E87.1 HYPONATREMIA: Status: ACTIVE | Noted: 2017-04-05

## 2021-06-15 NOTE — PROGRESS NOTES
ASSESMENT AND PLAN:    Physical, Health [place] -   Reviewed healthy lifestyle, diet, exercise, vitamins, and follow-up plan today with patient.  Reviewed age appropriate cancer and other screening recommendations.  Immunization review and update done.  Reviewed indicated lab tests, see lab orders.   Patient is almost quit smoking and has completely quit alcohol.  He was encouraged on his continued work on smoking cessation.  Encouraged the patient to follow-up with dentist and optometrist.      Restless legs syndrome  Well-controlled.  Continue Mirapex.  -     pramipexole (MIRAPEX) 0.25 MG tablet; Take 1 tablet (0.25 mg total) by mouth at bedtime.  Dispense: 30 tablet; Refill: 11    Gastroesophageal reflux disease, unspecified whether esophagitis present  Well-controlled, continue omeprazole.    History of elevated liver enzymes  -     Comprehensive Metabolic Panel    Hypercholesteremia  -     LDL Cholesterol, Direct    Subclinical hypothyroidism  -     Thyroid Cascade          HPI: 50-year-old male here for a physical.  He has not been using any alcohol.  He is down to 3 cigarettes/day on his smoking.  Patient reports that he has some intermittent dry mouth.  Since last visit, his sore throat has resolved completely and he is tolerating the omeprazole well.    ROS: No chest pain, no shortness of breath, no blood in the urine, no blood in the stool, no skin lesions that have been changing, remainder of review of systems is as above or negative.    Past Medical History:   Diagnosis Date     GERD (gastroesophageal reflux disease)      Hepatitis     ETOH     Hyperlipidemia      Seizure (H)        Current Outpatient Medications   Medication Sig Dispense Refill     acetaminophen (TYLENOL) 325 MG tablet Take 2 tablets (650 mg total) by mouth every 6 (six) hours as needed for pain (takes 2 at a time). 90 tablet 2     aluminum-magnesium hydroxide-simethicone (MAALOX ADVANCED) 200-200-20 mg/5 mL Susp Take 30 mL by  mouth every 4 (four) hours as needed. 800 mL 3     cholecalciferol, vitamin D3, (VITAMIN D3) 2,000 unit Tab Take 1 tablet (2,000 Units total) by mouth daily. 90 tablet 3     ibuprofen (ADVIL,MOTRIN) 600 MG tablet Take 1 tablet (600 mg total) by mouth every 6 (six) hours as needed for pain. 30 tablet 0     melatonin 3 mg Tab tablet Take 1-3 tablets (3-9 mg total) by mouth at bedtime as needed. 90 tablet 6     multivitamin therapeutic (THERAGRAN) tablet Take 1 tablet by mouth daily.  0     omeprazole (PRILOSEC) 20 MG capsule Take 1 capsule (20 mg total) by mouth daily before breakfast. 30 capsule 11     pramipexole (MIRAPEX) 0.25 MG tablet Take 1 tablet (0.25 mg total) by mouth at bedtime. 30 tablet 11     tamsulosin (FLOMAX) 0.4 mg cap Take 1 capsule (0.4 mg total) by mouth Daily after breakfast. 30 capsule 5     No current facility-administered medications for this visit.        Patient Active Problem List   Diagnosis     Nicotine Dependence     Hemorrhoids     Serum Enzyme Levels - AST (SGOT) Elevated     Limb Pain     Accessory Navicular     Lower Back Pain     Gastritis Due To H. Pylori     Abdominal Pain     Bone Cyst     Hypercholesteremia     Gastritis     Hypokalemia     Hyponatremia     Hematochezia     Maxillary sinusitis, acute     Insomnia     Alcoholic hepatitis     Benign adenomatous polyp of large intestine     Elevated liver enzymes     Oral leukoplakia     Subclinical hypothyroidism     Alcohol abuse     Nodule of upper lobe of left lung     Restless legs syndrome     Gastroesophageal reflux disease, unspecified whether esophagitis present       Social History     Socioeconomic History     Marital status:      Spouse name: None     Number of children: None     Years of education: None     Highest education level: None   Occupational History     None   Social Needs     Financial resource strain: None     Food insecurity     Worry: None     Inability: None     Transportation needs     Medical:  "None     Non-medical: None   Tobacco Use     Smoking status: Current Every Day Smoker     Smokeless tobacco: Never Used     Tobacco comment: Betelnut without Tobacco   Substance and Sexual Activity     Alcohol use: No     Comment: no longer drinking since 4/1/2017     Drug use: No     Sexual activity: Yes     Partners: Female   Lifestyle     Physical activity     Days per week: None     Minutes per session: None     Stress: None   Relationships     Social connections     Talks on phone: None     Gets together: None     Attends Christianity service: None     Active member of club or organization: None     Attends meetings of clubs or organizations: None     Relationship status: None     Intimate partner violence     Fear of current or ex partner: None     Emotionally abused: None     Physically abused: None     Forced sexual activity: None   Other Topics Concern     None   Social History Narrative     None       Social History     Tobacco Use   Smoking Status Current Every Day Smoker   Smokeless Tobacco Never Used   Tobacco Comment    Betelnut without Tobacco       OBJECTICE: /60   Pulse 92   Temp 97.9  F (36.6  C) (Oral)   Resp 24   Ht 5' 2\" (1.575 m)   Wt 171 lb (77.6 kg)   SpO2 92%   BMI 31.28 kg/m        Gen - alert, orientated, NAD  Eyes - fundascopic exam limited by the undialated pupil but looks symmetric  ENT - oropharynx clear, TMs clear  Neck - supple, no palpable mass or lymphadenopathy  CV - RRR, no murmur  Resp - lungs CTA  Ab - soft, nontender, no palpable mass or organomegaly   - normal appearance to the external genetalia, normal testicular exam bilaterally, no hernia  Extrem - warm, no edema  Neuro - CN II-XII intact, strength, sensation, reflexes intact and symmetric  Skin - no rash, no atypical appearing lesions seen.         Boaz Bell   10:23 AM 2/11/2021               "

## 2021-06-15 NOTE — PROGRESS NOTES
ASSESMENT AND PLAN:  Diagnoses and all orders for this visit:    History of anemia and leukopenia  Reviewed his colonoscopy report which was positive for polyps and hemorrhoids.  -     HM2(CBC w/o Differential) done today shows complete resolution and is completely normal.    History of abnormal TSH  -     Thyroid Gillespie    Sleep apnea  Reviewed the sleep medicine consultation, his sleep physician did feel like he likely has obstructive sleep apnea.  A overnight sleep study was recommended.  However, the patient declined that.  We discussed his options today and discussed the diagnosis.  The patient would like to pursue an aggressive lifestyle modification and weight loss plan, along with continuation of his Flonase which has resulted in good improvement for him.  We will proceed with this plan and recheck with me here in the clinic in 3-4 months and if he is not continuing to improve then reconsider a sleep study at that time.    Gastritis, presence of bleeding unspecified, unspecified chronicity, unspecified gastritis type  Restart omeprazole as prescribed below.  -     omeprazole (PRILOSEC) 20 MG capsule; Take 1 capsule (20 mg total) by mouth daily.  Dispense: 30 capsule; Refill: 6          SUBJECTIVE: 47-year-old male comes in today with several concerns.  Overall, is been doing very well.  He has not used any alcohol since I saw him last.  His energy is improving.  His sleep has been better and he feels he is much more able to breathe through his nose since initiation of Flonase.  He is tolerating the medication well.  He did have a sleep evaluation but did not follow-up for a sleep study after that.  Patient has a history of anemia and also had a borderline diminished white count on his last CBC.  This was done around the time after he had been having problems with heavy alcohol use and he has now been sober for many months.  The patient did run out of omeprazole and had a recurrence of symptoms of burning in  "the upper abdomen as well as radiation up into his neck and throat and burping and reflux.  No blood in the stool or black tarry stools.  No vomiting.  Patient reports that his rash resolved completely with the triamcinolone and he still has some left.  He is no longer using it.    Past Medical History:   Diagnosis Date     GERD (gastroesophageal reflux disease)      Hepatitis     ETOH     Hyperlipidemia      Seizure      Patient Active Problem List   Diagnosis     Nicotine Dependence     Hemorrhoids     Serum Enzyme Levels - AST (SGOT) Elevated     Limb Pain     Accessory Navicular     Lower Back Pain     Gastritis Due To H. Pylori     Abdominal Pain     Bone Cyst     Hypercholesteremia     Gastritis     Alcohol withdrawal seizure     Hypokalemia     Hyponatremia     Thrombocytopenia     Delirium tremens     Hematochezia     Maxillary sinusitis, acute     Insomnia     Alcoholic hepatitis     Benign adenomatous polyp of large intestine     Elevated liver enzymes     Oral leukoplakia     Current Outpatient Prescriptions   Medication Sig Dispense Refill     fluticasone (FLONASE) 50 mcg/actuation nasal spray 1 spray into each nostril daily. 16 g 6     multivitamin therapeutic (THERAGRAN) tablet Take 1 tablet by mouth daily.  0     omeprazole (PRILOSEC) 20 MG capsule Take 1 capsule (20 mg total) by mouth daily. 30 capsule 6     thiamine 50 MG tablet Take 1 tablet (50 mg total) by mouth daily.  0     triamcinolone (KENALOG) 0.1 % cream Apply topically 2 (two) times a day as needed. 80 g 1     No current facility-administered medications for this visit.      History   Smoking Status     Current Every Day Smoker   Smokeless Tobacco     Never Used     Comment: Betelnut without Tobacco       OBJECTICE: /86 (Patient Site: Left Arm, Patient Position: Sitting, Cuff Size: Adult Regular)  Pulse 92  Temp 98.6  F (37  C) (Oral)   Resp 20  Ht 5' 3\" (1.6 m)  Wt 172 lb (78 kg)  BMI 30.47 kg/m2     Recent Results (from the " past 24 hour(s))   HM2(CBC w/o Differential)    Collection Time: 01/08/18 10:04 AM   Result Value Ref Range    WBC 9.9 4.0 - 11.0 thou/uL    RBC 5.26 4.40 - 6.20 mill/uL    Hemoglobin 15.5 14.0 - 18.0 g/dL    Hematocrit 48.2 40.0 - 54.0 %    MCV 92 80 - 100 fL    MCH 29.4 27.0 - 34.0 pg    MCHC 32.1 32.0 - 36.0 g/dL    RDW 14.4 11.0 - 14.5 %    Platelets 286 140 - 440 thou/uL    MPV 7.8 7.0 - 10.0 fL        GEN-alert, appropriate, in no apparent distress   HEENT-sclera clear, oropharynx is clear, neck is supple with no palpable mass or thyroid abnormality.   CV-regular rate and rhythm with no murmur   RESP-lungs clear to auscultation   ABDOMINAL-soft, some mild tenderness to palpation of his right upper quadrant, no palpable mass.   EXTREM-warm with no edema   SKIN-no rash or ulcers      Boaz Bell

## 2021-06-16 PROBLEM — F10.10 ALCOHOL ABUSE: Status: ACTIVE | Noted: 2018-11-12

## 2021-06-16 PROBLEM — K21.9 GASTROESOPHAGEAL REFLUX DISEASE, UNSPECIFIED WHETHER ESOPHAGITIS PRESENT: Status: ACTIVE | Noted: 2021-02-11

## 2021-06-16 PROBLEM — R74.8 ELEVATED LIVER ENZYMES: Status: ACTIVE | Noted: 2017-09-07

## 2021-06-16 PROBLEM — R91.1 NODULE OF UPPER LOBE OF LEFT LUNG: Status: ACTIVE | Noted: 2019-06-13

## 2021-06-16 PROBLEM — G25.81 RESTLESS LEGS SYNDROME: Status: ACTIVE | Noted: 2019-12-04

## 2021-06-16 PROBLEM — G47.33 OBSTRUCTIVE SLEEP APNEA: Status: ACTIVE | Noted: 2021-06-01

## 2021-06-16 PROBLEM — K13.21 ORAL LEUKOPLAKIA: Status: ACTIVE | Noted: 2017-09-07

## 2021-06-16 PROBLEM — E03.8 SUBCLINICAL HYPOTHYROIDISM: Status: ACTIVE | Noted: 2018-07-09

## 2021-06-16 NOTE — PROGRESS NOTES
ASSESMENT AND PLAN:  Diagnoses and all orders for this visit:    Chest wall pain  Will use naproxen as prescribed below, discussed risks and benefits of the medication, will transition from scheduled to as needed over the next 1-2 weeks.  Follow-up if not improving.  -     naproxen (NAPROSYN) 375 MG tablet; Take 1 tablet (375 mg total) by mouth 2 (two) times a day with meals.  Dispense: 60 tablet; Refill: 1    Subclinical hypothyroidism  Viewed his lab results from his last clinic visit.  Discussed options including initiation of low-dose levothyroxine, which he would like to proceed with.  He will follow-up in 4 months for a recheck including TSH at that time.  -     levothyroxine (SYNTHROID, LEVOTHROID) 25 MCG tablet; Take 1 tablet (25 mcg total) by mouth daily.  Dispense: 30 tablet; Refill: 11    History of probable sleep apnea  Again reviewed the recommendations from the sleep clinic with the patient, he is going to further consider this and I did recommend follow-up with the sleep clinic.    SUBJECTIVE: 47-year-old male comes in today with concern about pain on the right side of his chest and abdomen.  It is along the posterior lateral chest wall and is consistent with pain that he had about 4 years ago after having an injury.  That pain had resolved completely but this feels similar.  It started about 1 week ago when he was leaning over to tie his shoes and he felt a sudden onset of moderate pain in that distribution.  The pain comes and goes, it is caused or made worse by twisting and bending movements of his chest wall.  No fevers or shortness of breath.  He continues to struggle with his sleep, he probably has sleep apnea but had declined the sleep study in the past, please see previous notes for details.  Patient has a history of alcohol abuse but has now been completely off of alcohol for many months, he has not used any alcohol since his last visit here in the clinic.    Past Medical History:   Diagnosis  "Date     GERD (gastroesophageal reflux disease)      Hepatitis     ETOH     Hyperlipidemia      Seizure      Patient Active Problem List   Diagnosis     Nicotine Dependence     Hemorrhoids     Serum Enzyme Levels - AST (SGOT) Elevated     Limb Pain     Accessory Navicular     Lower Back Pain     Gastritis Due To H. Pylori     Abdominal Pain     Bone Cyst     Hypercholesteremia     Gastritis     Alcohol withdrawal seizure     Hypokalemia     Hyponatremia     Thrombocytopenia     Delirium tremens     Hematochezia     Maxillary sinusitis, acute     Insomnia     Alcoholic hepatitis     Benign adenomatous polyp of large intestine     Elevated liver enzymes     Oral leukoplakia     Current Outpatient Prescriptions   Medication Sig Dispense Refill     fluticasone (FLONASE) 50 mcg/actuation nasal spray 1 spray into each nostril daily. 16 g 6     multivitamin therapeutic (THERAGRAN) tablet Take 1 tablet by mouth daily.  0     omeprazole (PRILOSEC) 20 MG capsule Take 1 capsule (20 mg total) by mouth daily. 30 capsule 6     triamcinolone (KENALOG) 0.1 % cream Apply topically 2 (two) times a day as needed. 80 g 1     levothyroxine (SYNTHROID, LEVOTHROID) 25 MCG tablet Take 1 tablet (25 mcg total) by mouth daily. 30 tablet 11     naproxen (NAPROSYN) 375 MG tablet Take 1 tablet (375 mg total) by mouth 2 (two) times a day with meals. 60 tablet 1     thiamine 50 MG tablet Take 1 tablet (50 mg total) by mouth daily.  0     No current facility-administered medications for this visit.      History   Smoking Status     Current Every Day Smoker   Smokeless Tobacco     Never Used     Comment: Betelnut without Tobacco       OBJECTICE: /86 (Patient Site: Left Arm, Patient Position: Sitting, Cuff Size: Adult Large)  Pulse 82  Temp 98.1  F (36.7  C) (Oral)   Resp 24  Ht 5' 3\" (1.6 m)  Wt 178 lb 8 oz (81 kg)  SpO2 98%  BMI 31.62 kg/m2     No results found for this or any previous visit (from the past 24 hour(s)).     GEN-alert, " appropriate, in no apparent distress   HEENT-neck is supple with no palpable mass or thyroid abnormality   CV-regular rate and rhythm with no murmur   RESP-lungs clear to auscultation   Musculoskeletal-his pain is repeated with palpation of the right chest wall.   EXTREM-warm with no edema   SKIN-no vesicles or other rash overlying his distribution of pain      Boaz Bell

## 2021-06-19 NOTE — PROGRESS NOTES
"ASSESMENT AND PLAN:  Diagnoses and all orders for this visit:    1. Subclinical hypothyroidism  -  Stress importance of taking his Thyroid meds as directed everyday.  Pt states he has been missing a couple doses every week.  -  VS stable and normal.  Pt feeling slightly better since taking thyroid meds.  -  Will  recheck again in November appt with Dr. Bell.   -  Follow up if symptoms not better or worsens.  Discussed indications for emergent f/u.      2. Urinary frequency  -  Has had similar sxs before, started again 2 weeks ago. Has never been dx with BPH.  -  Pt decline Prostate exam.  BPH education given.    -  Previous BMP on 8/1 shows normal glucose level.  Creatinine was elevated 1.39.    -  Discussed with Dr. Bell, if labs are normal then may start  Flomax.    -  Tx pending lab results.   Ordered:  -     Urinalysis-UC if Indicated  -     PSA (Prostatic-Specific Antigen), Diagnostic    3. Migraine  -  Pt has started taking Sumatriptan since last week and states headaches are well controlled.  -  Follow up if symptoms not better or worsens.     4. Other orders  -  Immunizations overdue.  Risks and benefits discussed.   Ordered:  -     Td, Adult, Adsorbed (blue label)    Patient is present with a Professional , Antolin.   Reviewed Medical/Social history and Medications.   See new changes above.   Discussed indications for emergent medical attention and routine F/u.  Patient/Parent/Guardian engaged in decision making process and verbalized understanding of the options discussed and agreed with the final treatment plan.     SUBJECTIVE:  Salas Clinton is a 48 y.o. Male who presents for  Urinary frequency, Elevated TSH f/u.     Pt complains of urinary frequency, \"I have to urinate at least every hour and at night it's worse.\"  Urine stream is normal, decreased urine amount,and feels need to strain in order to urinate.  Pt states he has had similar sxs in the past and resolved but started again since 2 " weeks ago.      Pt denies dysuria, hematuria, penile discharge, flank pain, fever/chills, n/v,  abdominal pain.  Discussed possible prostate hyperplasia, Pt decline rectal exam and would like meds if possible.  Told Pt we will need some lab work first before we can give meds for her prostate.   Discussed with Dr. Bell and states Pt may start Flomax if labs are negative.  Pt has f/u in 2 weeks, and if Pt agrees  Dr. Bell will preform exam.  Pt has normal glucose level last week.  Creatinine level was elevated.      ROS:  Comprehensive Review of Systems Negative except stated in HPI.     Past Medical History:   Diagnosis Date     GERD (gastroesophageal reflux disease)      Hepatitis     ETOH     Hyperlipidemia      Seizure (H)      Patient Active Problem List   Diagnosis     Nicotine Dependence     Hemorrhoids     Serum Enzyme Levels - AST (SGOT) Elevated     Limb Pain     Accessory Navicular     Lower Back Pain     Gastritis Due To H. Pylori     Abdominal Pain     Bone Cyst     Hypercholesteremia     Gastritis     Alcohol withdrawal seizure (H)     Hypokalemia     Hyponatremia     Thrombocytopenia (H)     Delirium tremens (H)     Hematochezia     Maxillary sinusitis, acute     Insomnia     Alcoholic hepatitis     Benign adenomatous polyp of large intestine     Elevated liver enzymes     Oral leukoplakia     Subclinical hypothyroidism     Current Outpatient Prescriptions   Medication Sig Dispense Refill     acetaminophen (TYLENOL) 325 MG tablet Take 2 tablets (650 mg total) by mouth every 6 (six) hours as needed for pain (takes 2 at a time). 90 tablet 2     multivitamin therapeutic (THERAGRAN) tablet Take 1 tablet by mouth daily.  0     omeprazole (PRILOSEC) 20 MG capsule Take 1 capsule (20 mg total) by mouth daily. 30 capsule 6     SUMAtriptan (IMITREX) 50 MG tablet Take 1 tablet (50 mg total) by mouth once as needed for migraine. 30 tablet 1     levothyroxine (SYNTHROID, LEVOTHROID) 25 MCG tablet Take 1 tablet (25  "mcg total) by mouth daily. (Patient not taking: Reported on 7/9/2018) 30 tablet 11     naproxen (NAPROSYN) 375 MG tablet Take 1 tablet (375 mg total) by mouth 2 (two) times a day with meals. (Patient not taking: Reported on 7/9/2018) 60 tablet 1     thiamine 50 MG tablet Take 1 tablet (50 mg total) by mouth daily. (Patient not taking: Reported on 7/9/2018)  0     No current facility-administered medications for this visit.      History   Smoking Status     Current Every Day Smoker   Smokeless Tobacco     Never Used     Comment: Betelnut without Tobacco     OBJECTIVE: /82  Pulse 84  Temp 97.8  F (36.6  C) (Oral)   Resp 24  Ht 5' 3\" (1.6 m)  Wt 176 lb 12 oz (80.2 kg)  SpO2 98%  BMI 31.31 kg/m2   No results found for this or any previous visit (from the past 24 hour(s)).    PHYSICAL:  General Alert, awake, not in acute distress.   CV Normal S1 & S2. No murmurs.   RESP Non-labored, RRR, CTAB. No wheezes or crackles.    RECTAL Decline.       Gopi Amin PA-C           "

## 2021-06-19 NOTE — PROGRESS NOTES
ASSESMENT AND PLAN:  Diagnoses and all orders for this visit:    Subclinical hypothyroidism  Discussed options with the patient, he is going to restart low-dose levothyroxine once daily and plan to follow-up in 4 months for a TSH, sooner follow-up if problems.    Nicotine Dependence  Counseling done today on a quit plan.    Probable obstructive sleep apnea syndrome  Patient has declined follow-up with the sleep clinic as has been recommended.  He feels his sleep has been better and wants to focus on weight loss and healthy eating and alcohol abstinence.    Chest pain  Based on previous workup and exam as detailed below, this is very likely musculoskeletal.  He is getting a good response to naproxen which he is just taking once per day.  Continue 375 mg naproxen daily.        SUBJECTIVE: 48-year-old male here for follow-up on several issues.  He continues to have some chest pain on the right side of the chest.  It comes and goes.  It does get worse if he puts direct pressure over his chest wall.  It improves with naproxen, he has been taking naproxen 375 mg once daily and has noticed a definite improvement in the pain.  Patient has been sleeping better.  He has been working on healthy eating and has been completely abstinent from alcohol.  He took his thyroid medication for 1 month but then did not get refills.  He did notice that when he was taking it he had some increased energy.  He knows how to get refills but was not sure whether he should continue to take the medication every day.  He is smoking currently about 4 cigarettes per day.  In the past, he tried a prescription aide to help him quit but it made him very nauseous.    Past Medical History:   Diagnosis Date     GERD (gastroesophageal reflux disease)      Hepatitis     ETOH     Hyperlipidemia      Seizure (H)      Patient Active Problem List   Diagnosis     Nicotine Dependence     Hemorrhoids     Serum Enzyme Levels - AST (SGOT) Elevated     Limb Pain      "Accessory Navicular     Lower Back Pain     Gastritis Due To H. Pylori     Abdominal Pain     Bone Cyst     Hypercholesteremia     Gastritis     Alcohol withdrawal seizure (H)     Hypokalemia     Hyponatremia     Thrombocytopenia (H)     Delirium tremens (H)     Hematochezia     Maxillary sinusitis, acute     Insomnia     Alcoholic hepatitis     Benign adenomatous polyp of large intestine     Elevated liver enzymes     Oral leukoplakia     Subclinical hypothyroidism     Current Outpatient Prescriptions   Medication Sig Dispense Refill     multivitamin therapeutic (THERAGRAN) tablet Take 1 tablet by mouth daily.  0     omeprazole (PRILOSEC) 20 MG capsule Take 1 capsule (20 mg total) by mouth daily. 30 capsule 6     levothyroxine (SYNTHROID, LEVOTHROID) 25 MCG tablet Take 1 tablet (25 mcg total) by mouth daily. (Patient not taking: Reported on 7/9/2018) 30 tablet 11     naproxen (NAPROSYN) 375 MG tablet Take 1 tablet (375 mg total) by mouth 2 (two) times a day with meals. (Patient not taking: Reported on 7/9/2018) 60 tablet 1     thiamine 50 MG tablet Take 1 tablet (50 mg total) by mouth daily. (Patient not taking: Reported on 7/9/2018)  0     No current facility-administered medications for this visit.      History   Smoking Status     Current Every Day Smoker   Smokeless Tobacco     Never Used     Comment: Betelnut without Tobacco       OBJECTICE: /80 (Patient Site: Right Arm, Patient Position: Sitting, Cuff Size: Adult Regular)  Pulse 85  Temp 98.4  F (36.9  C) (Oral)   Resp 20  Ht 5' 3\" (1.6 m)  Wt 175 lb 8 oz (79.6 kg)  SpO2 97%  BMI 31.09 kg/m2     No results found for this or any previous visit (from the past 24 hour(s)).     GEN-alert, appropriate, in no apparent distress   Musculoskeletal-repetition of his chest pain with tenderness to palpation over his right chest wall.   CV-regular rate and rhythm with no murmur   RESP-lungs clear to auscultation   ENT-neck is supple with no palpable mass or " thyroid abnormality or lymphadenopathy.  Sclera are clear.   EXTREM-warm with no edema   SKIN-no ulcers or vesicles overlying his area of pain      Boaz Bell

## 2021-06-19 NOTE — PROGRESS NOTES
ASSESMENT AND PLAN:  Diagnoses and all orders for this visit:    1. Generalized headaches  -  No red flags. Can not r/o Medication Overuse headaches. Most consistent with Migraine and will treat.   -  Pt endorses taking Tylenol only once in a while, although he states he has head  -  Pt requesting refill on Tylenol.    -  Indications for emergent f/u given.   -  Follow up if symptoms not better or worsens.    Ordered:  -     SUMAtriptan (IMITREX) 50 MG tablet; Take 1 tablet (50 mg total) by mouth once as needed for migraine.  Dispense: 30 tablet; Refill: 1   Refill:  -     acetaminophen (TYLENOL) 325 MG tablet; Take 2 tablets (650 mg total) by mouth every 6 (six) hours as needed for pain (takes 2 at a time).  Dispense: 90 tablet; Refill: 2    2. Fatigue  -  Discussed possible BUFFY; Pt decline referral to Sleep clinic.  -  Pt states he has missed 2-3 doses of Thyroid meds a week (see below).  -  Tx and f/u pending results.   Ordered:  -     Thyroid [place]  -     Basic Metabolic Panel  -     HM1(CBC and Differential)  -     HM1 (CBC with Diff)  -     T4, Free    3. Subclinical hypothyroidism  -  Pt states he has been missing his Synthroid medications 2-3 times a week.    -  Education on function of Thyroid and importance of taking medication regularly, and most likely contribution to his fatigue.  -  Labs drawn today, f/u pending lab results.     Patient is present with a Professional , Gabino.  Reviewed Medical/Social history and Medications.  See new changes above.   Discussed indications for emergent medical attention and routine F/u.  Patient/Parent/Guardian engaged in decision making process and verbalized understanding of the options discussed and agreed with the final treatment plan.     SUBJECTIVE:  Salas Clinton is a 48 y.o. Male who presents with Chronic headaches.    Pt has been taking Tylenol for his headaches.  He endorses he doesn't take it alot, however states taking it whenever he has  headaches, which is several times a week. Possible Medication overuse headaches.  Headaches rated 5/10, occurs more in the mornings, pain is frontal with mild dizziness, tinnitus, and watery eyes.  Bright lights and loud noise seems to make it worse.  Denies auras, fever/chills, wheezing, SOB, CP, n/v, abdominal pain, diarrhea/constipation, hematochezia, paresthesia, decreased sensation/strength.      Pt also complains of Fatigue for several months.  Pt has hx of snoring and exam shows crowding of airway which make Pt high risk for BUFFY.  Pt declines referral to Sleep clinic.     ROS:  Comprehensive Review of Systems Negative except stated in HPI.     Past Medical History:   Diagnosis Date     GERD (gastroesophageal reflux disease)      Hepatitis     ETOH     Hyperlipidemia      Seizure (H)      Patient Active Problem List   Diagnosis     Nicotine Dependence     Hemorrhoids     Serum Enzyme Levels - AST (SGOT) Elevated     Limb Pain     Accessory Navicular     Lower Back Pain     Gastritis Due To H. Pylori     Abdominal Pain     Bone Cyst     Hypercholesteremia     Gastritis     Alcohol withdrawal seizure (H)     Hypokalemia     Hyponatremia     Thrombocytopenia (H)     Delirium tremens (H)     Hematochezia     Maxillary sinusitis, acute     Insomnia     Alcoholic hepatitis     Benign adenomatous polyp of large intestine     Elevated liver enzymes     Oral leukoplakia     Subclinical hypothyroidism     Current Outpatient Prescriptions   Medication Sig Dispense Refill     acetaminophen (TYLENOL) 325 MG tablet Take 650 mg by mouth every 6 (six) hours as needed for pain (takes 2 at a time).       multivitamin therapeutic (THERAGRAN) tablet Take 1 tablet by mouth daily.  0     omeprazole (PRILOSEC) 20 MG capsule Take 1 capsule (20 mg total) by mouth daily. 30 capsule 6     levothyroxine (SYNTHROID, LEVOTHROID) 25 MCG tablet Take 1 tablet (25 mcg total) by mouth daily. (Patient not taking: Reported on 7/9/2018) 30 tablet  "11     naproxen (NAPROSYN) 375 MG tablet Take 1 tablet (375 mg total) by mouth 2 (two) times a day with meals. (Patient not taking: Reported on 7/9/2018) 60 tablet 1     thiamine 50 MG tablet Take 1 tablet (50 mg total) by mouth daily. (Patient not taking: Reported on 7/9/2018)  0     No current facility-administered medications for this visit.      History   Smoking Status     Current Every Day Smoker   Smokeless Tobacco     Never Used     Comment: Betelnut without Tobacco     OBJECTIVE: BP 98/68  Pulse 84  Temp 98.1  F (36.7  C) (Oral)   Resp 18  Ht 5' 3\" (1.6 m)  Wt 178 lb 8 oz (81 kg)  SpO2 94%  BMI 31.62 kg/m2   No results found for this or any previous visit (from the past 24 hour(s)).    PHYSICAL:  General Alert, awake, not in acute distress.   HEENT:             -Head Atraumatic, normocephalic.            -Eyes PERRL, no erythema, discharge, conjunctiva clear.             -Ears TMs intact, no drainage, no erythema or edema.            -Nose    Nostrils patent,  no edema.            -Throat Oropharynx without edema, erythema.  Uvula midline, no deviation. Mallampati 3.             -Neck Neck FROM, no adenopathy.  Thyroid not visibly enlarged.   CV Normal S1 & S2. No murmurs.   RESP Non-labored, RRR, CTAB. No wheezes or crackles.    PSYCH Normal and appropriate for age.    NEURO Grossly intact, no focal deficit. Oriented x 3.  Gait normal.        Gopi Amin PA-C           "

## 2021-06-19 NOTE — PROGRESS NOTES
"ASSESMENT AND PLAN:  Diagnoses and all orders for this visit:    1. Paresthesia, Left foot  -  Neurovascular intact bilaterally.  FROM w/o tenderness.   - Was taking Thiamine but has stopped over a month ago.   -  See below.   -  Hx of chronic alcohol use; Pt stopped drinking a year ago.   Ordered:  -     Vitamin B12  -     Vitamin B1 (Thiamine), Whole Blood (VIT B1 WB)    2. Foot pain, bilateral  -  Hx of surgery in 2013 of Right foot.  Pt endorse pain and weakness of feet with weight bearing.   -  Atraumatic, no surgery.  Imaging not indicated at this time.   -  Toe and heal walking slight difficulty.    -  Right food with tenderness at Medial malleolus. Pain in all toes bilaterally.    Ordered:  -     Ambulatory referral to Podiatry    Patient is present with a Professional , Antolin.   Reviewed Medical/Social history and Medications.  .  Discussed indications for emergent medical attention and routine F/u.  Patient/Parent/Guardian engaged in decision making process and verbalized understanding of the options discussed and agreed with the final treatment plan.     SUBJECTIVE:  Salas Clinton is a 48 y.o. Male who presents with complaints of bilateral foot pain and paresthesia of Left foot x 2-3 weeks.      Pt has hx of modified Kidner procedure on Right foot in 9/2013.  He states current pain in his Right foot feels similar to pain prior to his surgery,  \" Pain starts at my toes and goes up to my ankles.\"     He also complains of paresthesia of his Left foot.   Denies paresthesia and weakness of ankles or legs.  Pt endorses pain and  weakness with weight bearing and walking, \" I have to rest even if I walk short distances.\"    On exam, neurovascular intact, FROM w/o tenderness, and equal and gait is normal. Pt was able to walk on toes and on heels w/o much difficulty.   Pt denies fever/chills, new or worsening sxs.     ROS:  Comprehensive Review of Systems Negative except stated in HPI.     Past Medical " History:   Diagnosis Date     GERD (gastroesophageal reflux disease)      Hepatitis     ETOH     Hyperlipidemia      Seizure (H)      Patient Active Problem List   Diagnosis     Nicotine Dependence     Hemorrhoids     Serum Enzyme Levels - AST (SGOT) Elevated     Limb Pain     Accessory Navicular     Lower Back Pain     Gastritis Due To H. Pylori     Abdominal Pain     Bone Cyst     Hypercholesteremia     Gastritis     Alcohol withdrawal seizure (H)     Hypokalemia     Hyponatremia     Thrombocytopenia (H)     Delirium tremens (H)     Hematochezia     Maxillary sinusitis, acute     Insomnia     Alcoholic hepatitis     Benign adenomatous polyp of large intestine     Elevated liver enzymes     Oral leukoplakia     Subclinical hypothyroidism     Current Outpatient Prescriptions   Medication Sig Dispense Refill     acetaminophen (TYLENOL) 325 MG tablet Take 2 tablets (650 mg total) by mouth every 6 (six) hours as needed for pain (takes 2 at a time). 90 tablet 2     metoclopramide (REGLAN) 10 MG tablet Take 1 tablet (10 mg total) by mouth every 6 (six) hours. 30 tablet 0     multivitamin therapeutic (THERAGRAN) tablet Take 1 tablet by mouth daily.  0     omeprazole (PRILOSEC) 20 MG capsule Take 1 capsule (20 mg total) by mouth daily. 30 capsule 6     SUMAtriptan (IMITREX) 50 MG tablet Take 1 tablet (50 mg total) by mouth once as needed for migraine. 30 tablet 1     tamsulosin (FLOMAX) 0.4 mg cap Take 1 capsule (0.4 mg total) by mouth Daily after breakfast. 30 capsule 5     levothyroxine (SYNTHROID, LEVOTHROID) 25 MCG tablet Take 1 tablet (25 mcg total) by mouth daily. (Patient not taking: Reported on 7/9/2018) 30 tablet 11     naproxen (NAPROSYN) 375 MG tablet Take 1 tablet (375 mg total) by mouth 2 (two) times a day with meals. (Patient not taking: Reported on 7/9/2018) 60 tablet 1     thiamine 50 MG tablet Take 1 tablet (50 mg total) by mouth daily. (Patient not taking: Reported on 7/9/2018)  0     No current  "facility-administered medications for this visit.      History   Smoking Status     Current Every Day Smoker   Smokeless Tobacco     Never Used     Comment: Betelnut without Tobacco     OBJECTIVE: /80  Pulse (!) 110  Temp 97.7  F (36.5  C) (Oral)   Resp 20  Ht 5' 3\" (1.6 m)  Wt 173 lb 8 oz (78.7 kg)  SpO2 97%  BMI 30.73 kg/m2   No results found for this or any previous visit (from the past 24 hour(s)).    PHYSICAL:  General Alert, awake, not in acute distress.   CV Normal S1 & S2. No murmurs.   RESP Non-labored, RRR, CTAB. No wheezes or crackles.    EXTREMITY No edema, erythema, deformity. FROM.  Pulses present. Normal capillary refill.  Tenderness of Right medial malleolus and all toes of both feet with palpation.    PSYCH Normal and appropriate for age.    NEURO Grossly intact, no focal deficit. Sensation and Strength intact. Oriented x 3.  Gait normal.  Mild difficulty with toe and heel walk due to pain.       Gopi Amin PA-C           "

## 2021-06-19 NOTE — PROGRESS NOTES
"ASSESMENT AND PLAN:  Diagnoses and all orders for this visit:    1.  Follow up exam after treatment  -  Pt was seen at [facility] [facility] on 8/12 for complaints of Headaches.   Work up was negative and Pt  treated for Tension Headache vs Migraine. Pt given IV fluids,  Benadryl, Reglan, and Toradol.     -  Pt states he is feeling much better after treatment and denies new or worsening sxs.  -  Pt is currently asymptomatic.   -  Indications for emergent f/u discussed.    2.  Elevated serum creatinine  -  Creatinine was elevated, 1.39, on 8/1.  Will recheck.   Ordered:  -     Renal Function Profile    3.  Urinary symptoms  -  Pt states he no longer has urinary sxs since he was hospitalized on 8/12.    Patient is present with a Professional , Antolin.  Reviewed Medical/Social history and Medications.  No new changes.  Discussed indications for emergent medical attention and routine F/u.  Patient/Parent/Guardian engaged in decision making process and verbalized understanding of the options discussed and agreed with the final treatment plan.     SUBJECTIVE:  Salas Clinton is a 48 y.o. Male who presents for Follow up after ED treatment.    Pt has relevant past hx of Seizures who was seen at [facility] on 8/1 with complaint of headaches.  ED workup negative and Pt treated with IV fluids, Benadryl, Reglan, and Toradol.  Pt states he is feeling much better after ED visit, \"After they gave me the shot, it got better.\"   Pt is asymptomatic and denies any new or worsening sxs, fever/chills, wheezing, SOB, CP, n/v, abdominal pain, dysuria, urinary sxs.    ROS:  Comprehensive Review of Systems Negative except stated in HPI.     Past Medical History:   Diagnosis Date     GERD (gastroesophageal reflux disease)      Hepatitis     ETOH     Hyperlipidemia      Seizure (H)      Patient Active Problem List   Diagnosis     Nicotine Dependence     Hemorrhoids     Serum Enzyme Levels - AST (SGOT) Elevated     Limb Pain     Accessory " "Navicular     Lower Back Pain     Gastritis Due To H. Pylori     Abdominal Pain     Bone Cyst     Hypercholesteremia     Gastritis     Alcohol withdrawal seizure (H)     Hypokalemia     Hyponatremia     Thrombocytopenia (H)     Delirium tremens (H)     Hematochezia     Maxillary sinusitis, acute     Insomnia     Alcoholic hepatitis     Benign adenomatous polyp of large intestine     Elevated liver enzymes     Oral leukoplakia     Subclinical hypothyroidism     Current Outpatient Prescriptions   Medication Sig Dispense Refill     acetaminophen (TYLENOL) 325 MG tablet Take 2 tablets (650 mg total) by mouth every 6 (six) hours as needed for pain (takes 2 at a time). 90 tablet 2     levothyroxine (SYNTHROID, LEVOTHROID) 25 MCG tablet Take 1 tablet (25 mcg total) by mouth daily. (Patient not taking: Reported on 7/9/2018) 30 tablet 11     metoclopramide (REGLAN) 10 MG tablet Take 1 tablet (10 mg total) by mouth every 6 (six) hours. 30 tablet 0     multivitamin therapeutic (THERAGRAN) tablet Take 1 tablet by mouth daily.  0     naproxen (NAPROSYN) 375 MG tablet Take 1 tablet (375 mg total) by mouth 2 (two) times a day with meals. (Patient not taking: Reported on 7/9/2018) 60 tablet 1     omeprazole (PRILOSEC) 20 MG capsule Take 1 capsule (20 mg total) by mouth daily. 30 capsule 6     SUMAtriptan (IMITREX) 50 MG tablet Take 1 tablet (50 mg total) by mouth once as needed for migraine. 30 tablet 1     tamsulosin (FLOMAX) 0.4 mg cap Take 1 capsule (0.4 mg total) by mouth Daily after breakfast. 30 capsule 5     thiamine 50 MG tablet Take 1 tablet (50 mg total) by mouth daily. (Patient not taking: Reported on 7/9/2018)  0     No current facility-administered medications for this visit.      History   Smoking Status     Current Every Day Smoker   Smokeless Tobacco     Never Used     Comment: Betelnut without Tobacco     OBJECTIVE: /70  Pulse 89  Temp 97.7  F (36.5  C) (Oral)   Resp 16  Ht 5' 3\" (1.6 m)  Wt 178 lb 1.9 " oz (80.8 kg)  SpO2 95%  BMI 31.55 kg/m2   No results found for this or any previous visit (from the past 24 hour(s)).    PHYSICAL:  General Alert, awake, not in acute distress.   HEENT:             -Head Atraumatic, normocephalic.            -Eyes PERRL, no erythema, discharge, conjunctiva clear.             -Ears TMs intact, no drainage, no erythema or edema.            -Nose    Nostrils patent,  no edema.            -Throat Oropharynx without edema, erythema.  Uvula midline, no deviation.             -Neck Neck FROM, no adenopathy.  Thyroid not visibly enlarged.   CV Normal S1 & S2. No murmurs.   RESP Non-labored, RRR, CTAB. No wheezes or crackles.    ABDOMEN Soft,non-tender. No rigidity, guarding.  Normal bowel sounds.    EXTREMITY No edema, erythema, deformity. FROM.  Pulses present. Normal capillary refill.    PSYCH Normal and appropriate for age.    NEURO Grossly intact, no focal deficits.        Gopi Amin PA-C

## 2021-06-21 NOTE — PROGRESS NOTES
ASSESMENT AND PLAN:  Diagnoses and all orders for this visit:    Probable restless legs syndrome  Also in the differential diagnosis  would be a complicated sleep disorder given his possible history of sleep apnea, I have recommended that the patient follow-up with the sleep clinic but the patient has declined this in the past, he is going to see how his treatment with the below medication and follow-up goes in 3 weeks with me here in the clinic and then decide from there.  We discussed medication options and are going to use medication as prescribed below after review of the risks and benefits of medication with the patient with the help of a professional .  Follow up with me in clinic in 3 weeks for recheck, sooner if problems.    -     pramipexole (MIRAPEX) 0.125 MG tablet; Take 1 tablet (0.125 mg total) by mouth at bedtime.  Dispense: 30 tablet; Refill: 3    Given that the abdominal symptoms  have resolved completely will just observe and he will follow-up if he has recurrence.      SUBJECTIVE: 48-year-old male who last week had several days of nausea, vomiting, abdominal pain.  His symptoms have now resolved completely having improved over the weekend.  Patient has a previous history of alcohol abuse but has not been using any alcohol since he has been sober, please see previous notes for details.  Given that those symptoms have improved completely, his main concern today is a sensation that he has in his legs.  He describes it as a feeling that is difficult to describe but involves a sensation like he has to move his legs or they feel like they are burning.  He frequently gets up at night in order to move around which temporarily gives him some improvement in this very uncomfortable feeling.  He has not been sleeping well and thinks that it is largely related to the sensation in his legs that has seemingly gotten worse over the last several months.  Patient had been in with some similar symptoms  previously and some workup was done including negative lab evaluation which we were able to review today.    Past Medical History:   Diagnosis Date     GERD (gastroesophageal reflux disease)      Hepatitis     ETOH     Hyperlipidemia      Seizure (H)      Patient Active Problem List   Diagnosis     Nicotine Dependence     Hemorrhoids     Serum Enzyme Levels - AST (SGOT) Elevated     Limb Pain     Accessory Navicular     Lower Back Pain     Gastritis Due To H. Pylori     Abdominal Pain     Bone Cyst     Hypercholesteremia     Gastritis     Alcohol withdrawal seizure (H)     Hypokalemia     Hyponatremia     Thrombocytopenia (H)     Delirium tremens (H)     Hematochezia     Maxillary sinusitis, acute     Insomnia     Alcoholic hepatitis     Benign adenomatous polyp of large intestine     Elevated liver enzymes     Oral leukoplakia     Subclinical hypothyroidism     Current Outpatient Prescriptions   Medication Sig Dispense Refill     acetaminophen (TYLENOL) 325 MG tablet Take 2 tablets (650 mg total) by mouth every 6 (six) hours as needed for pain (takes 2 at a time). 90 tablet 2     levothyroxine (SYNTHROID, LEVOTHROID) 25 MCG tablet Take 1 tablet (25 mcg total) by mouth daily. 30 tablet 11     multivitamin therapeutic (THERAGRAN) tablet Take 1 tablet by mouth daily.  0     omeprazole (PRILOSEC) 20 MG capsule Take 1 capsule (20 mg total) by mouth daily. 30 capsule 6     thiamine 50 MG tablet Take 1 tablet (50 mg total) by mouth daily.  0     pramipexole (MIRAPEX) 0.125 MG tablet Take 1 tablet (0.125 mg total) by mouth at bedtime. 30 tablet 3     tamsulosin (FLOMAX) 0.4 mg cap Take 1 capsule (0.4 mg total) by mouth Daily after breakfast. (Patient not taking: Reported on 10/15/2018) 30 capsule 5     No current facility-administered medications for this visit.      History   Smoking Status     Current Every Day Smoker   Smokeless Tobacco     Never Used     Comment: Betelnut without Tobacco       OBJECTICE: /80  "(Patient Site: Right Arm, Patient Position: Sitting, Cuff Size: Adult Regular)  Pulse 75  Temp 98.4  F (36.9  C) (Oral)   Resp 16  Ht 5' 3\" (1.6 m)  Wt 175 lb (79.4 kg)  SpO2 97%  BMI 31 kg/m2     No results found for this or any previous visit (from the past 24 hour(s)).     GEN-alert, appropriate, in no apparent distress   Neurologic-strength and sensation are intact and symmetric, cranial nerves II through XII are intact.   CV-regular rate and rhythm with no murmur   RESP-lungs clear to auscultation   ABDOMINAL-soft and nontender   EXTREM-warm with no ankle edema   SKIN-no foot ulcers or vesicles      Boaz Bell          "

## 2021-06-21 NOTE — PROGRESS NOTES
ASSESMENT AND PLAN:  Diagnoses and all orders for this visit:    Restless legs syndrome  Will increase dose of Mirapex as ordered below.  -     pramipexole (MIRAPEX) 0.25 MG tablet; Take 1 tablet (0.25 mg total) by mouth at bedtime.  Dispense: 30 tablet; Refill: 6    Insomnia, unspecified type  Discussed options with the patient, counseled on complete alcohol abstinence as detailed below, will add melatonin as ordered below.  -     melatonin 3 mg Tab tablet; Take 1-3 tablets (3-9 mg total) by mouth at bedtime as needed.  Dispense: 90 tablet; Refill: 6  Discussed indications for routine and urgent follow-up, on routine follow-up if he is not getting good improvement in his symptoms will more strongly encouraged him to follow-up with the sleep clinic.    Alcohol abuse  He had done very well with complete abstinence since his hospitalization.  However, he had one episode of drinking as detailed below over this last few weeks.  Counseled on regaining complete sobriety and abstinence.  Follow-up if problems.    Need for immunization against influenza  -     Influenza, Seasonal,Quad Inj, 36+ MOS          SUBJECTIVE: 48-year-old male comes in today for follow-up.  Since last visit he has been taking the Mirapex every day.  He is noticed an improvement in the restlessness of his legs but has not noticed any improvement in his sleep.  It is difficult for him to fall asleep and sometimes he has not falling asleep until 3 in the morning.  Patient has a history of a severe episode of alcohol withdrawal and a history of alcoholic hepatitis, had been completely abstinent from alcohol at the time of his last visit, see that note for details.  However, since his last visit patient acknowledges one episode of drinking alcohol when some friends came over.    Past Medical History:   Diagnosis Date     GERD (gastroesophageal reflux disease)      Hepatitis     ETOH     Hyperlipidemia      Seizure (H)      Patient Active Problem List    Diagnosis     Nicotine Dependence     Hemorrhoids     Serum Enzyme Levels - AST (SGOT) Elevated     Limb Pain     Accessory Navicular     Lower Back Pain     Gastritis Due To H. Pylori     Abdominal Pain     Bone Cyst     Hypercholesteremia     Gastritis     Alcohol withdrawal seizure (H)     Hypokalemia     Hyponatremia     Thrombocytopenia (H)     Delirium tremens (H)     Hematochezia     Maxillary sinusitis, acute     Insomnia     Alcoholic hepatitis     Benign adenomatous polyp of large intestine     Elevated liver enzymes     Oral leukoplakia     Subclinical hypothyroidism     Alcohol abuse     Current Outpatient Medications   Medication Sig Dispense Refill     acetaminophen (TYLENOL) 325 MG tablet Take 2 tablets (650 mg total) by mouth every 6 (six) hours as needed for pain (takes 2 at a time). 90 tablet 2     levothyroxine (SYNTHROID, LEVOTHROID) 25 MCG tablet Take 1 tablet (25 mcg total) by mouth daily. 30 tablet 11     multivitamin therapeutic (THERAGRAN) tablet Take 1 tablet by mouth daily.  0     naproxen (NAPROSYN) 500 MG tablet Take 500 mg by mouth 2 (two) times a day.       omeprazole (PRILOSEC) 20 MG capsule Take 1 capsule (20 mg total) by mouth daily. 30 capsule 6     pramipexole (MIRAPEX) 0.25 MG tablet Take 1 tablet (0.25 mg total) by mouth at bedtime. 30 tablet 6     tamsulosin (FLOMAX) 0.4 mg cap Take 1 capsule (0.4 mg total) by mouth Daily after breakfast. 30 capsule 5     thiamine 50 MG tablet Take 1 tablet (50 mg total) by mouth daily.  0     melatonin 3 mg Tab tablet Take 1-3 tablets (3-9 mg total) by mouth at bedtime as needed. 90 tablet 6     No current facility-administered medications for this visit.      Social History     Tobacco Use   Smoking Status Current Every Day Smoker   Smokeless Tobacco Never Used   Tobacco Comment    Betelnut without Tobacco       OBJECTICE: /70 (Patient Site: Right Arm, Patient Position: Sitting, Cuff Size: Adult Regular)   Pulse 86   Temp 97.9  F  "(36.6  C) (Oral)   Resp 16   Ht 5' 3\" (1.6 m)   Wt 172 lb (78 kg)   SpO2 98%   BMI 30.47 kg/m       No results found for this or any previous visit (from the past 24 hour(s)).     GEN-alert, appropriate, in no apparent distress   HEENT-mucous memories are moist, neck is supple without palpable mass or lymphadenopathy, sclera are clear   CV-regular rate and rhythm with no murmur   RESP-lungs clear to auscultation   ABDOMINAL-soft and nontender to palpation with no palpable masses organomegaly   EXTREM-warm with no edema   SKIN-no jaundice or rash      Boaz Bell          "

## 2021-06-23 NOTE — TELEPHONE ENCOUNTER
Refill Approved    Rx renewed per Medication Renewal Policy. Medication was last renewed on 1/8/2018 for 30/6  Last OV 11/12/2018  Dasia Olson, [facility] Triage/Med Refill 2/9/2019     Requested Prescriptions   Pending Prescriptions Disp Refills     omeprazole (PRILOSEC) 20 MG capsule [Pharmacy Med Name: OMEPRAZOLE DR 20 MG CAPSULE] 30 capsule 0     Sig: TAKE ONE CAPSULE BY MOUTH DAILY    GI Medications Refill Protocol Passed - 2/6/2019  2:42 PM       Passed - PCP or prescribing provider visit in last 12 or next 3 months.    Last office visit with prescriber/PCP: 11/12/2018 Boaz Bell MD OR same dept: 11/12/2018 Boaz Bell MD OR same specialty: 11/12/2018 Boaz Bell MD  Last physical: 9/7/2017 Last MTM visit: Visit date not found   Next visit within 3 mo: Visit date not found  Next physical within 3 mo: Visit date not found  Prescriber OR PCP: Boaz Bell MD  Last diagnosis associated with med order: There are no diagnoses linked to this encounter.  If protocol passes may refill for 12 months if within 3 months of last provider visit (or a total of 15 months).

## 2021-06-25 NOTE — PROGRESS NOTES
ASSESMENT AND PLAN:  Diagnoses and all orders for this visit:    Night sweats, Diaphoresis, History of active tuberculosis  -     Ambulatory referral to [facility]  -     XR Chest 2 Views done today in clinic and reviewed by me personally does not show any obvious signs of active tuberculosis.  However, I am concerned by the chronic nature of the symptoms over the last 6 months and concerned by his past history detailed below of what sounds like pulmonary tuberculosis [place].  I would like him to see the TB clinic at [facility].  Referral as detailed above.  Also in the differential diagnosis would be a reaction that he is having to some GI distress and indigestion.  Will trial over-the-counter Mylanta over the next few days and let me know if it gives him any relief.    Obstructive sleep apnea  -     Ambulatory referral to Sleep Medicine  We will hold off on actually scheduling this referral until he has had the TB evaluation detailed above.  Discussed this plan with our specialty scheduling staff.    Hemorrhoids, unspecified hemorrhoid type  Reviewed results of his previous colonoscopy as detailed below.  Observation.    Dermatitis  -     triamcinolone (KENALOG) 0.1 % cream; Apply topically 2 (two) times a day.  Dispense: 80 g; Refill: 1        Reviewed the risks and benefits of the treatment plan with the patient and/or caregiver and we discussed indications for routine and emergent follow-up.  This was done with the help of a professional .    44 minutes spent on the date of the encounter doing chart review, patient visit and documentation and face to face counseling on above.     SUBJECTIVE: 51-year-old male reports that over the past 6 months he has been having sweating occurring usually at night, sometimes during the day.  He notes that it is more likely to occur after eating.  He breaks out in a sweat that lasts for a few minutes and then resolves.  Sometimes  associated with some mild upper abdominal discomfort and crampy pain.  He reports that he is also having nighttime sweating, he will wake up at night and this sheets will be wet.  His wife confirms this.  His wife also reports that he has been having pauses in his breathing at night that are startling to her and how long they last.  Patient reports that he is having some nonrestful sleep and that he snores.    The patient reports that he did have tuberculosis in the past.  In approximately 2006, just prior to coming to the [place] a refugee, he was treated with 9 months of multi drug therapy.  Patient reports that he had to take multiple pills per day.  The patient's understanding was that he was being treated for tuberculosis of the lungs.  I searched his medical record for or [place] records from the [place] and none are currently available.  On his current tuberculosis review of systems he has not been having chronic cough, no hemoptysis, no recurring fevers, no shortness of breath, but is having the sweating and night sweats as detailed above.    Patient reports he has been getting some intermittent red blood in the stool.  This is not a new problem for him, it has happened previously.  He had a work-up for GI bleeding in the past, this included upper endoscopy that was negative and lower endoscopy that showed some colon polyps and hemorrhoids.  He is due for follow-up colonoscopy again in 2022.    Patient reports an itchy rash over the lateral aspect of his right ankle.  The rash has been there for many years but waxes and wanes in terms of how much itching it causes.    Past Medical History:   Diagnosis Date     GERD (gastroesophageal reflux disease)      Hepatitis     ETOH     Hyperlipidemia      Seizure (H)      Patient Active Problem List   Diagnosis     Nicotine Dependence     Hemorrhoids     Serum Enzyme Levels - AST (SGOT) Elevated     Limb Pain     Accessory Navicular     Lower Back Pain      Gastritis Due To H. Pylori     Abdominal Pain     Bone Cyst     Hypercholesteremia     Gastritis     Hypokalemia     Hyponatremia     Hematochezia     Maxillary sinusitis, acute     Insomnia     Alcoholic hepatitis     Benign adenomatous polyp of large intestine     Elevated liver enzymes     Oral leukoplakia     Subclinical hypothyroidism     Alcohol abuse     Nodule of upper lobe of left lung     Restless legs syndrome     Gastroesophageal reflux disease, unspecified whether esophagitis present     Obstructive sleep apnea     Current Outpatient Medications   Medication Sig Dispense Refill     acetaminophen (TYLENOL) 325 MG tablet Take 2 tablets (650 mg total) by mouth every 6 (six) hours as needed for pain (takes 2 at a time). 90 tablet 2     aluminum-magnesium hydroxide-simethicone (MAALOX ADVANCED) 200-200-20 mg/5 mL Susp Take 30 mL by mouth every 4 (four) hours as needed. 800 mL 3     ibuprofen (ADVIL,MOTRIN) 600 MG tablet Take 1 tablet (600 mg total) by mouth every 6 (six) hours as needed for pain. 30 tablet 0     omeprazole (PRILOSEC) 20 MG capsule Take 1 capsule (20 mg total) by mouth daily before breakfast. 30 capsule 11     pramipexole (MIRAPEX) 0.25 MG tablet Take 1 tablet (0.25 mg total) by mouth at bedtime. 30 tablet 11     cholecalciferol, vitamin D3, (VITAMIN D3) 2,000 unit Tab Take 1 tablet (2,000 Units total) by mouth daily. 90 tablet 3     melatonin 3 mg Tab tablet Take 1-3 tablets (3-9 mg total) by mouth at bedtime as needed. 90 tablet 6     multivitamin therapeutic (THERAGRAN) tablet Take 1 tablet by mouth daily.  0     tamsulosin (FLOMAX) 0.4 mg cap Take 1 capsule (0.4 mg total) by mouth Daily after breakfast. 30 capsule 5     triamcinolone (KENALOG) 0.1 % cream Apply topically 2 (two) times a day. 80 g 1     No current facility-administered medications for this visit.      Social History     Tobacco Use   Smoking Status Current Every Day Smoker   Smokeless Tobacco Never Used   Tobacco  "Comment    Betelnut without Tobacco       OBJECTICE: /70 (Patient Site: Left Arm, Patient Position: Sitting)   Pulse 75   Temp 98.1  F (36.7  C) (Oral)   Resp 16   Ht 5' 2\" (1.575 m)   Wt 186 lb (84.4 kg)   SpO2 98%   BMI 34.02 kg/m       No results found for this or any previous visit (from the past 24 hour(s)).     GEN-alert, appropriate, in no apparent distress   HEENT-neck supple with no palpable mass or lymphadenopathy   CV-regular rate and rhythm with no murmur   RESP-lungs clear to auscultation   ABDOMINAL-soft, some diffuse tenderness to deep palpation, no guarding or rebound, no palpable masses organomegaly.   EXTREM-no pitting edema   SKIN-raised thickened rash over the lateral foot and ankle of the right lower extremity.      Boaz Bell          "

## 2021-06-29 ENCOUNTER — COMMUNICATION - HEALTHEAST (OUTPATIENT)
Dept: FAMILY MEDICINE | Facility: CLINIC | Age: 51
End: 2021-06-29

## 2021-07-04 NOTE — TELEPHONE ENCOUNTER
Telephone Encounter by Bella Arguello at 6/30/2021  9:49 AM     Author: Bella Arguello Service: -- Author Type: --    Filed: 6/30/2021  9:50 AM Encounter Date: 6/29/2021 Status: Signed    : Bella Arguello       Spoke to pt via Sheree Webber. Call xferred to  [facility], appt scheduled for:    Aug 06, 2021 10:30 AM   New Sleep Patient with ERICA Le CNP   [facility] ([facility] ) [address] [place] [id number] [phone]

## 2021-07-04 NOTE — TELEPHONE ENCOUNTER
Telephone Encounter by Levi Zhao LPN at 6/29/2021  3:57 PM     Author: Levi Zhao LPN Service: -- Author Type: Licensed Nurse    Filed: 6/29/2021  3:59 PM Encounter Date: 6/29/2021 Status: Signed    : Levi Zhao LPN (Licensed Nurse)       Pt need help scheduling with sleep clinic.

## 2021-07-06 VITALS
WEIGHT: 186 LBS | BODY MASS INDEX: 34.23 KG/M2 | RESPIRATION RATE: 16 BRPM | OXYGEN SATURATION: 98 % | SYSTOLIC BLOOD PRESSURE: 114 MMHG | TEMPERATURE: 98.1 F | DIASTOLIC BLOOD PRESSURE: 70 MMHG | HEIGHT: 62 IN | HEART RATE: 75 BPM

## 2021-08-03 PROBLEM — R56.9 ALCOHOL WITHDRAWAL SEIZURE (H): Status: RESOLVED | Noted: 2017-04-05 | Resolved: 2019-07-15

## 2021-08-03 PROBLEM — D69.6 THROMBOCYTOPENIA (H): Status: RESOLVED | Noted: 2017-04-05 | Resolved: 2019-07-15

## 2021-08-03 PROBLEM — F10.931 DELIRIUM TREMENS (H): Status: RESOLVED | Noted: 2017-04-05 | Resolved: 2019-07-15

## 2021-08-03 PROBLEM — F10.939 ALCOHOL WITHDRAWAL SEIZURE (H): Status: RESOLVED | Noted: 2017-04-05 | Resolved: 2019-07-15

## 2021-08-05 NOTE — PROGRESS NOTES
"Salas Clinton is a 51 year old male being evaluated via a billable telephone visit.     \"This telephone visit will be conducted via a call between you and your physician/provider. We have found that certain health care needs can be provided without the need for an in-person visit or physical exam.  This service lets us provide the care you need with a telephone conversation.  If a prescription is necessary we can send it directly to your pharmacy.  If lab work is needed we can place an order for that and you can then stop by our lab to have the test done at a later time.\"    Telephone visits are billed at different rates depending on your insurance coverage.  Please reach out to your insurance provider with any questions.    Patient has given verbal consent for  a Telephone visit? Yes    What telephone number would you like your provider to contact at at: Text to cell phone: [phone]    How would you like to obtain your AVS? Mail a copy    Does patient have any form of state insurance? Yes    Do you have wifi? Yes  Do you have a smart phone? Yes  Can you download an kehinde on your phone comfortably with out assistance? Yes  Can you watch a waygum video? Yes    ERICA Martinez CNP    Telephone Visit Details:     Telephone Visit Start Time: 10:32 AM    Telephone Visit End Time:  11:32 AM      Sleep Consultation:    Date on this visit: 8/6/2021    *Due to language barrier, a Sheree  was present during the history-taking and subsequent discussion with this patient.    Salas Clinton is sent by No ref. provider found for a sleep consultation regarding snoring, possible sleep apnea.    Primary Physician: Boaz Bell     Salas Clinton is a 51-year-old male with a PMH pertinent for history of alcohol abuse- in remission, alcoholic hepatitis, subclinical hypothyroidism, hypercholesterolemia, nicotine dependence, pulmonary nodule, lower back pain, GERD, insomnia, and restless leg syndrome who presents today with reports " of frequent snoring, gasping, choking and pauses in breathing during sleep for several years, his symptoms are largely unchanged since his previous evaluation.  This patient was previously seen by Dr. Ronnie Ng on 11/13/2017 in sleep consultation for evaluation of excessive daytime sleepiness, pauses in breathing during sleep, tiredness, and loud snoring.  His symptoms at that time were suspicious for obstructive sleep apnea and a sleep study was recommended for evaluation, however, the patient declined the sleep study and had planned to lose weight for symptomatic improvement.    Salas goes to sleep at 12:00 AM during the week. He wakes up at 6:00 AM without an alarm. He falls asleep in 5 minutes.  Salas has difficulty falling back asleep.  He wakes up 2-4 times a night for 20 minutes before falling back to sleep.  Salas wakes up to go to the bathroom and smoke .  On weekends, Salas goes to sleep at about the same time. Patient gets an average of 3 hours of sleep per night.     Patient does watch TV in bed and leave the TV on in bed briefly and does not use electronics in bed, worry in bed about anything and read in bed.     Salas is unemployed.    Salas does snore every night and snoring is loud. Patient does not have a regular bed partner. There is report of snoring, gasping, choking and poor quality of sleep.  He does have witnessed apneas. They always sleep separately.  Patient sleeps on his side. He has occasional sleep disturbance, snort arousals, morning dry mouth and dizziness, denies occasional morning headaches, morning confusion and restless legs. Salas has occasional bruxism, sleep paralysis (x2/month maybe) and sleep talking and denies any sleep walking, dream enactment, cataplexy and hypnogogic/hypnopompic hallucinations.    He denies sleep walking, sleep talking, enuresis and sleep terrors as a child.  Salas has difficulty breathing through his nose, denies claustrophobia, reflux at night, heartburn and  depression.      Salas has gained approximately 30 pounds in the last year.  Patient describes themself as a morning person.  He would prefer to go to sleep at 8:00 - 9:00 PM and wake up at 6:00 AM.  Patient's [place] Sleepiness score 4/24 consistent with no daytime sleepiness.      Salas naps 2 times per week for 10-20 minutes, feels refreshed after naps. He takes some inadvertant naps.  He admits dozing and falling asleep while driving. The most recent episode was 1 year(s) ago.  Patient was counseled on the importance of driving while alert, to pull over if drowsy, or nap before getting into the vehicle if sleepy.  He uses 2 cups/week of coffee. Last caffeine intake is usually before noon.    Allergies:    Allergies   Allergen Reactions     Folic Acid Unknown       Medications:    Current Outpatient Medications   Medication Sig Dispense Refill     acetaminophen (TYLENOL) 325 MG tablet [ACETAMINOPHEN (TYLENOL) 325 MG TABLET] Take 2 tablets (650 mg total) by mouth every 6 (six) hours as needed for pain (takes 2 at a time). 90 tablet 2     aluminum-magnesium hydroxide-simethicone (MAALOX ADVANCED) 200-200-20 mg/5 mL Susp [ALUMINUM-MAGNESIUM HYDROXIDE-SIMETHICONE (MAALOX ADVANCED) 200-200-20 MG/5 ML SUSP] Take 30 mL by mouth every 4 (four) hours as needed. 800 mL 3     cholecalciferol, vitamin D3, (VITAMIN D3) 2,000 unit Tab [CHOLECALCIFEROL, VITAMIN D3, (VITAMIN D3) 2,000 UNIT TAB] Take 1 tablet (2,000 Units total) by mouth daily. 90 tablet 3     ibuprofen (ADVIL,MOTRIN) 600 MG tablet [IBUPROFEN (ADVIL,MOTRIN) 600 MG TABLET] Take 1 tablet (600 mg total) by mouth every 6 (six) hours as needed for pain. 30 tablet 0     melatonin 3 mg Tab tablet [MELATONIN 3 MG TAB TABLET] Take 1-3 tablets (3-9 mg total) by mouth at bedtime as needed. 90 tablet 6     multivitamin therapeutic (THERAGRAN) tablet [MULTIVITAMIN THERAPEUTIC (THERAGRAN) TABLET] Take 1 tablet by mouth daily.  0     omeprazole (PRILOSEC) 20 MG capsule  [OMEPRAZOLE (PRILOSEC) 20 MG CAPSULE] Take 1 capsule (20 mg total) by mouth daily before breakfast. 30 capsule 11     pramipexole (MIRAPEX) 0.25 MG tablet [PRAMIPEXOLE (MIRAPEX) 0.25 MG TABLET] Take 1 tablet (0.25 mg total) by mouth at bedtime. 30 tablet 11     tamsulosin (FLOMAX) 0.4 mg cap [TAMSULOSIN (FLOMAX) 0.4 MG CAP] Take 1 capsule (0.4 mg total) by mouth Daily after breakfast. 30 capsule 5     triamcinolone (KENALOG) 0.1 % cream [TRIAMCINOLONE (KENALOG) 0.1 % CREAM] Apply topically 2 (two) times a day. 80 g 1       Problem List:  Patient Active Problem List    Diagnosis Date Noted     Obstructive sleep apnea 06/01/2021     Priority: Medium     Gastroesophageal reflux disease, unspecified whether esophagitis present 02/11/2021     Priority: Medium     Restless legs syndrome 12/04/2019     Priority: Medium     Nodule of upper lobe of left lung 06/13/2019     Priority: Medium     Alcohol abuse 11/12/2018     Priority: Medium     Subclinical hypothyroidism 07/09/2018     Priority: Medium     Benign adenomatous polyp of large intestine 09/07/2017     Priority: Medium     Elevated liver enzymes 09/07/2017     Priority: Medium     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     Priority: Medium     Alcoholic hepatitis 04/12/2017     Priority: Medium     Maxillary sinusitis, acute 04/09/2017     Priority: Medium     Insomnia 04/09/2017     Priority: Medium     Hemorrhoids      Priority: Medium     Created by Conversion  Replacement Utility updated for latest IMO load         Hematochezia      Priority: Medium     Hypokalemia 04/05/2017     Priority: Medium     Hyponatremia 04/05/2017     Priority: Medium     Nicotine Dependence      Priority: Medium     Created by Conversion         Serum Enzyme Levels - AST (SGOT) Elevated      Priority: Medium     Created by Conversion         Gastritis Due To H. Pylori      Priority: Medium     Created by Conversion  Replacement Utility updated for latest IMO load         Abdominal Pain       Priority: Medium     Created by Conversion  Replacement Utility updated for latest IMO load         Bone Cyst      Priority: Medium     Created by Conversion  Replacement Utility updated for latest IMO load         Hypercholesteremia 07/09/2015     Priority: Medium     Gastritis 07/09/2015     Priority: Medium     Limb Pain      Priority: Medium     Created by Conversion         Accessory Navicular      Priority: Medium     Created by Conversion         Lower Back Pain      Priority: Medium     Created by Conversion            Past Medical/Surgical History:  Past Medical History:   Diagnosis Date     GERD (gastroesophageal reflux disease)      Hepatitis     ETOH     Hyperlipidemia      Seizure (H)      Past Surgical History:   Procedure Laterality Date     FOOT SURGERY Right        Social History:  Social History     Socioeconomic History     Marital status:      Spouse name: Not on file     Number of children: Not on file     Years of education: Not on file     Highest education level: Not on file   Occupational History     Not on file   Tobacco Use     Smoking status: Current Every Day Smoker     Smokeless tobacco: Never Used     Tobacco comment: [place] without Tobacco   Substance and Sexual Activity     Alcohol use: No     Comment: Alcoholic Drinks/day: no longer drinking since 4/1/2017     Drug use: No     Sexual activity: Yes     Partners: Female   Other Topics Concern     Not on file   Social History Narrative     Not on file     Social Determinants of Health     Financial Resource Strain:      Difficulty of Paying Living Expenses:    Food Insecurity:      Worried About Running Out of Food in the Last Year:      Ran Out of Food in the Last Year:    Transportation Needs:      Lack of Transportation (Medical):      Lack of Transportation (Non-Medical):    Physical Activity:      Days of Exercise per Week:      Minutes of Exercise per Session:    Stress:      Feeling of Stress :    Social Connections:       Frequency of Communication with Friends and Family:      Frequency of Social Gatherings with Friends and Family:      Attends Spiritism Services:      Active Member of Clubs or Organizations:      Attends Club or Organization Meetings:      Marital Status:    Intimate Partner Violence:      Fear of Current or Ex-Partner:      Emotionally Abused:      Physically Abused:      Sexually Abused:        Family History:  Family History   Problem Relation Age of Onset     Snoring Father        Review of Systems:  CONSTITUTIONAL: NEGATIVE for weight gain/loss, fever, chills, drug allergies.  CONSTITUTIONAL:  POSITIVE for  sweats and night sweats  EYES: NEGATIVE for changes in vision, blind spots, double vision.  ENT: NEGATIVE for ear pain, sore throat, sinus pain, post-nasal drip, runny nose, bloody nose  CARDIAC: NEGATIVE for fast heartbeats or fluttering in chest, chest pain or pressure, breathlessness when lying flat, swollen legs  CARDIAC:  POSITIVE for  swollen feet  NEUROLOGIC: NEGATIVE headaches, numbness in the arms or legs.  NEUROLOGIC:  POSITIVE for  weakness in the legs  DERMATOLOGIC: NEGATIVE for rashes, new moles or change in mole(s)  PULMONARY: NEGATIVE SOB at rest, dry cough, productive cough, coughing up blood  PULMONARY:  POSITIVE for  SOB with activity and wheezing   GASTROINTESTINAL: NEGATIVE for nausea or vomitting, loose or watery stools, fat or grease in stools, abdominal pain, bowel movements black in color or blood noted.  GASTROINTESTINAL:  POSITIVE for  constipation  GENITOURINARY: NEGATIVE for pain during urination, blood in urine, urinating more frequently than usual, irregular menstrual periods.  MUSCULOSKELETAL: NEGATIVE for bone or joint pain, swollen joints.  MUSCULOSKELETAL:  POSITIVE for muscle pain  ENDOCRINE: NEGATIVE for increased thirst or urination, diabetes.  LYMPHATIC: NEGATIVE for swollen lymph nodes, lumps or bumps in the breasts or nipple discharge.    Physical  Examination:  Vitals: There were no vitals taken for this visit.  BMI= 34.0 kg/m   Height: 5 feet 2 inches  Weight: 186 pounds       [place] Total Score 8/5/2021   Total score - [place] 4       HYUN Total Score: 11 (08/05/21 1350)    Last Comprehensive Metabolic Panel:  Sodium   Date Value Ref Range Status   02/11/2021 140 136 - 145 mmol/L Final     Potassium   Date Value Ref Range Status   02/11/2021 3.8 3.5 - 5.0 mmol/L Final     Chloride   Date Value Ref Range Status   02/11/2021 106 98 - 107 mmol/L Final     Carbon Dioxide (CO2)   Date Value Ref Range Status   02/11/2021 23 22 - 31 mmol/L Final     Anion Gap   Date Value Ref Range Status   02/11/2021 11 5 - 18 mmol/L Final     Glucose   Date Value Ref Range Status   02/11/2021 118 70 - 125 mg/dL Final     Urea Nitrogen   Date Value Ref Range Status   02/11/2021 12 8 - 22 mg/dL Final     Creatinine   Date Value Ref Range Status   02/11/2021 1.05 0.70 - 1.30 mg/dL Final     GFR Estimate   Date Value Ref Range Status   02/11/2021 >60 >60 mL/min/1.73m2 Final     Calcium   Date Value Ref Range Status   02/11/2021 8.9 8.5 - 10.5 mg/dL Final     Bilirubin Total   Date Value Ref Range Status   02/11/2021 0.3 0.0 - 1.0 mg/dL Final     Alkaline Phosphatase   Date Value Ref Range Status   02/11/2021 44 (L) 45 - 120 U/L Final     ALT   Date Value Ref Range Status   02/11/2021 37 0 - 45 U/L Final     AST   Date Value Ref Range Status   02/11/2021 73 (H) 0 - 40 U/L Final       Lab Results   Component Value Date    WBC 8.5 06/10/2019     Lab Results   Component Value Date    RBC 4.53 06/10/2019     Lab Results   Component Value Date    HGB 10.2 06/10/2019     Lab Results   Component Value Date    HCT 35.3 06/10/2019     No components found for: MCT  Lab Results   Component Value Date    MCV 78 06/10/2019     Lab Results   Component Value Date    MCH 22.5 06/10/2019     Lab Results   Component Value Date    MCHC 28.9 06/10/2019     Lab Results   Component Value Date    RDW 18.6  06/10/2019     Lab Results   Component Value Date     06/10/2019         GENERAL APPEARANCE: alert, no distress and cooperative  RESP: Unlabored, easy breathing with normal conversational speech  NEURO: mentation intact and speech normal  PSYCH: mentation appears normal and affect normal/bright  Mallampati Class: Unable to examine  Tonsillar Stage: Unable to examine    Impression/Plan:  1. Sleep disturbance  2. Snoring  3. Witnessed apneic spells  4. Obesity (BMI 30.0-34.9)  - Comprehensive Sleep Study; Future    This is a pleasant 51-year-old male with a PMH pertinent for history of alcohol abuse- in remission, alcoholic hepatitis, subclinical hypothyroidism, hypercholesterolemia, nicotine dependence, pulmonary nodule, lower back pain, GERD, insomnia, and restless leg syndrome who presents today with reports of frequent snoring, gasping, choking and pauses in breathing during sleep for several years, his symptoms are largely unchanged since his previous evaluation. This patient was previously seen by Dr. Ronnie Ng on 11/13/2017 in sleep consultation for possible obstructive sleep apnea.  It was recommended at that time to undergo polysomnography, however, the patient decided to try to lose weight in order to improve his symptoms.  The patient symptoms are consistent with probable sleep disordered breathing.  Of note, the patient is noted to have a diagnosis of RLS as well as previous lab testing from June 2019 showing some mild anemia with hemoglobin 10.2 g/dL, no new lab work has been performed.  He does take pramipexole, presumably for RLS symptoms, and also some melatonin at bedtime.    We discussed the pathophysiology of obstructive sleep apnea and the risks associated with untreated BUFFY. The patient has elected to undergo in lab polysomnography (PSG) to further evaluate his symptoms of possible obstructive sleep apnea.    Literature provided regarding sleep apnea.      He will follow up with me in  approximately two weeks after his sleep study has been competed to review the results and discuss plan of care.       Polysomnography reviewed.  Obstructive sleep apnea reviewed.  Complications of untreated sleep apnea were reviewed.    70 minutes were spent on the date of the encounter doing chart review, history and exam, documentation and further activities as noted above.     ERICA Martinez CNP  Sleep Medicine    CC: No ref. provider found    This note was written with the assistance of the Dragon voice-dictation technology software. The final document, although reviewed, may contain errors. For corrections, please contact the office.

## 2021-08-05 NOTE — PATIENT INSTRUCTIONS
Your BMI is There is no height or weight on file to calculate BMI.  Weight management is a personal decision.  If you are interested in exploring weight loss strategies, the following discussion covers the approaches that may be successful. Body mass index (BMI) is one way to tell whether you are at a healthy weight, overweight, or obese. It measures your weight in relation to your height.  A BMI of 18.5 to 24.9 is in the healthy range. A person with a BMI of 25 to 29.9 is considered overweight, and someone with a BMI of 30 or greater is considered obese. More than two-thirds of American adults are considered overweight or obese.  Being overweight or obese increases the risk for further weight gain. Excess weight may lead to heart disease and diabetes.  Creating and following plans for healthy eating and physical activity may help you improve your health.  Weight control is part of healthy lifestyle and includes exercise, emotional health, and healthy eating habits. Careful eating habits lifelong are the mainstay of weight control. Though there are significant health benefits from weight loss, long-term weight loss with diet alone may be very difficult to achieve- studies show long-term success with dietary management in less than 10% of people. Attaining a healthy weight may be especially difficult to achieve in those with severe obesity. In some cases, medications, devices and surgical management might be considered.  What can you do?  If you are overweight or obese and are interested in methods for weight loss, you should discuss this with your provider.     Consider reducing daily calorie intake by 500 calories.     Keep a food journal.     Avoiding skipping meals, consider cutting portions instead.    Diet combined with exercise helps maintain muscle while optimizing fat loss. Strength training is particularly important for building and maintaining muscle mass. Exercise helps reduce stress, increase energy,  and improves fitness. Increasing exercise without diet control, however, may not burn enough calories to loose weight.       Start walking three days a week 10-20 minutes at a time    Work towards walking thirty minutes five days a week     Eventually, increase the speed of your walking for 1-2 minutes at time    In addition, we recommend that you review healthy lifestyles and methods for weight loss available through the National Institutes of Health patient information sites:  http://win.niddk.nih.gov/publications/index.htm    And look into health and wellness programs that may be available through your health insurance provider, employer, local community center, or manuel club.    Weight management plan: Patient was referred to their PCP to discuss a diet and exercise plan.    Patient Education     What Are Snoring and Obstructive Sleep Apnea?  If you ve ever had a stuffed-up nose, you know the feeling of trying to breathe through a very narrow passageway. This is what happens in your throat when you snore. While you sleep, structures in your throat partly block your air passage. This makes the passage narrow and hard to breathe through. In some cases, the entire passage may become blocked so you can t breathe at all. This is called obstructive sleep apnea.      Snoring       Obstructive sleep apnea      Snoring  If your throat structures are too large or the muscles relax too much during sleep, the air passage may be partly blocked for a brief moment. But the air eventually passes through. As air from the nose or mouth passes around this blockage, the throat structures vibrate. This causes the familiar sound of snoring. This snoring sound can be loud and may wake up others, or even themselves, during the night. Snoring gets worse as more and more of the air passage is blocked.   Obstructive sleep apnea  If the structures partly or completely block the throat, air can t flow to the lungs at all. This is  called hypopnea (decreased breathing) or apnea (meaning  no breathing ). The lungs aren t getting fresh air. So the brain tells the body to wake up just enough to tighten the muscles and unblock the air passage. With a loud gasp, breathing starts again. This process may be repeated over and over again during the night. This can make your sleep fragmented with lighter stages of sleep. You may not remember waking up many times during the night however due to lighter sleep you will most likely feel tired the next day. The lack of sleep and fresh air can also strain your lungs, heart, and other organs. This may lead to problems such as high blood pressure, diabetes, behavioral disorders, heart attack, or stroke.   Problems in the nose and jaw  Problems in the structure of the nose may block breathing. A crooked (deviated) septum or swollen turbinates can make snoring worse or lead to apnea. Also, a receding jaw may make the tongue sit too far back. Then it s more likely to block the airway when you re asleep.   [employer] last reviewed this educational content on 9/1/2019 2000-2021 [employer]. All rights reserved. This information is not intended as a substitute for professional medical care. Always follow your healthcare professional's instructions.

## 2021-08-06 ENCOUNTER — VIRTUAL VISIT (OUTPATIENT)
Dept: SLEEP MEDICINE | Facility: CLINIC | Age: 51
End: 2021-08-06
Payer: COMMERCIAL

## 2021-08-06 DIAGNOSIS — R06.81 WITNESSED APNEIC SPELLS: ICD-10-CM

## 2021-08-06 DIAGNOSIS — R06.83 SNORING: ICD-10-CM

## 2021-08-06 DIAGNOSIS — E66.811 OBESITY (BMI 30.0-34.9): ICD-10-CM

## 2021-08-06 DIAGNOSIS — G47.9 SLEEP DISTURBANCE: Primary | ICD-10-CM

## 2021-08-06 PROCEDURE — 99205 OFFICE O/P NEW HI 60 MIN: CPT | Mod: 95 | Performed by: NURSE PRACTITIONER

## 2021-08-17 NOTE — NURSING NOTE
Sleep study and  follow up appointment scheduled. AVS and sleep study information packet sent via mailed to patient's home. 8/17/2021 9:57 AM            ALONSO [place]  [facility]

## 2021-10-05 ENCOUNTER — THERAPY VISIT (OUTPATIENT)
Dept: SLEEP MEDICINE | Facility: CLINIC | Age: 51
End: 2021-10-05
Attending: NURSE PRACTITIONER
Payer: COMMERCIAL

## 2021-10-05 DIAGNOSIS — E66.811 OBESITY (BMI 30.0-34.9): ICD-10-CM

## 2021-10-05 DIAGNOSIS — R06.83 SNORING: ICD-10-CM

## 2021-10-05 DIAGNOSIS — R06.81 WITNESSED APNEIC SPELLS: ICD-10-CM

## 2021-10-05 DIAGNOSIS — G47.9 SLEEP DISTURBANCE: ICD-10-CM

## 2021-10-05 PROCEDURE — 95810 POLYSOM 6/> YRS 4/> PARAM: CPT | Performed by: FAMILY MEDICINE

## 2021-10-06 LAB — SLPCOMP: NORMAL

## 2021-10-06 NOTE — PATIENT INSTRUCTIONS
[place] [facility]    1. Your sleep study will be reviewed by a sleep physician within the next few days.     2. Please follow up in the sleep clinic as scheduled, or, make an appointment with your sleep provider to be seen within two weeks to discuss the results of the sleep study.    3. If you have any questions or problems with your treatment plan, please contact your sleep clinic provider at [phone] to further manage your condition.    4. Please review your attached medication list, and, at your follow-up appointment advise your sleep clinic provider about any changes.    5. Go to [email or web address] for more information about your sleep problems.    NIELS Walters  October 6, 2021

## 2021-10-21 VITALS — BODY MASS INDEX: 30.12 KG/M2 | HEIGHT: 63 IN | WEIGHT: 170 LBS

## 2021-10-21 ASSESSMENT — MIFFLIN-ST. JEOR: SCORE: 1531.24

## 2021-10-21 NOTE — PATIENT INSTRUCTIONS
"MY INFORMATION ON SLEEP APNEA-  Salas Clinton    DOCTOR : ERICA Martinez CNP  SLEEP CENTER :      MY CONTACT NUMBER:   [facility] [place] [facility]  [phone]  [facility]   [phone]  [facility]   [phone]      [facility]  [phone]  [facility]   [phone]      [place]:  1. What is Obstructive Sleep Apnea (BUFFY)? BUFFY is the most common type of sleep apnea. Apnea literally means, \"without breath.\" It is characterized by repetitive pauses in breathing, despite continued effort to breathe, and is usually associated with a reduction in blood oxygen saturation. Apneas can last 10 to over 60 seconds. It is caused by narrowing or collapse of the upper airway as muscles relax during sleep.   2. What are the consequences of BUFFY? Symptoms include: daytime sleepiness- possibly increasing the risk of falling asleep while driving, unrefreshing/restless sleep, snoring, insomnia, waking frequently to urinate, waking with heartburn or reflux, reduced concentration and memory, and morning headaches. Other health consequences may include development of high blood pressure. Untreated BUFFY also can contribute to heart disease, stroke and diabetes.   3. What are the treatment options? In most situations, sleep apnea is a lifelong disease that must be managed with daily therapy. Continuous Positive Airway (CPAP) is the most reliable treatment. A mouthguard to hold your jaw forward is usually the next most reliable option. Other options include postioning devices (to keep you off your back), nasal valves, tongue retaining device, weight loss, surgery. There is more detail about these options toward the end of this document.  4. What are the most important things to remember about using CPAP?     WHERE CAN I FIND MORE INFORMATION?    American Academy of Sleep Medicine Patient information on sleep " disorders:  http://yoursleep.aasmnet.org    CPAP -  WHY AND HOW?                 Continuous positive airway pressure, or CPAP, is the most effective treatment for obstructive sleep apnea. It works by blowing room air, through a mask, to hold your throat open. A decision to use CPAP is a major step forward in the pursuit of a healthier life. The successful use of CPAP will help you breathe easier, sleep better and live healthier. Using CPAP can be a positive experience if you keep these rocha points in mind:  1. Commitment  CPAP is not a quick fix for your problem. It involves a long-term commitment to improve your sleep and your health.    2. Communication  Stay in close communication with both your sleep doctor and your CPAP supplier. Ask lots of questions and seek help when you need it.    3. Consistency  Use CPAP all night, every night and for every nap. You will receive the maximum health benefits from CPAP when you use it every time that you sleep. This will also make it easier for your body to adjust to the treatment.    4. Correction  The first machine and mask that you try may not be the best ones for you. Work with your sleep doctor and your CPAP supplier to make corrections to your equipment selection. Ask about trying a different type of machine or mask if you have ongoing problems. Make sure that your mask is a good fit and learn to use your equipment properly.    5. Challenge  Tell a family member or close friend to ask you each morning if you used your CPAP the previous night. Have someone to challenge you to give it your best effort.    6. Connection   Your adjustment to CPAP will be easier if you are able to connect with others who use the same treatment. Ask your sleep doctor if there is a support group in your area for people who have sleep apnea, or look for one on the Internet.  7. Comfort   Increase your level of comfort by using a saline spray, decongestant or heated humidifier if CPAP irritates  "your nose, mouth or throat. Use your unit's \"ramp\" setting to slowly get used to the air pressure level. There may be soft pads you can buy that will fit over your mask straps. Look on www.CPAP.com for accessories that can help make CPAP use more comfortable.  8. Cleaning   Clean your mask, tubing and headgear on a regular basis. Put this time in your schedule so that you don't forget to do it. Check and replace the filters for your CPAP unit and humidifier.    9. Completion   Although you are never finished with CPAP therapy, you should reward yourself by celebrating the completion of your first month of treatment. Expect this first month to be your hardest period of adjustment. It will involve some trial and error as you find the machine, mask and pressure settings that are right for you.    10. Continuation  After your first month of treatment, continue to make a daily commitment to use your CPAP all night, every night and for every nap.    CPAP-Tips to starting with success:  Begin using your CPAP for short periods of time during the day while you watch TV or read.    Use CPAP every night and for every nap. Using it less often reduces the health benefits and makes it harder for your body to get used to it.    Newer CPAP models are virtually silent; however, if you find the sound of your CPAP machine to be bothersome, place the unit under your bed to dampen the sound.     Make small adjustments to your mask, tubing, straps and headgear until you get the right fit. Tightening the mask may actually worsen the leak.  If it leaves significant marks on your face or irritates the bridge of your nose, it may not be the best mask for you.  Speak with the person who supplied the mask and consider trying other masks. Insurances will allow you to try different masks during the first month of starting CPAP.  Insurance also covers a new mask, hose and filter about every 6 months.    Use a saline nasal spray to ease mild nasal " congestion. Neti-Pot or saline nasal rinses may also help. Nasal gel sprays can help reduce nasal dryness.  Biotene mouthwash can be helpful to protect your teeth if you experience frequent dry mouth.  Dry mouth may be a sign of air escaping out of your mouth or out of the mask in the case of a full face mask.  Speak with your provider if you expect that is the case.     Take a nasal decongestant to relieve more severe nasal or sinus congestion.  Do not use Afrin (oxymetazoline) nasal spray more than 3 days in a row.  Speak with your sleep doctor if your nasal congestion is chronic.    Use a heated humidifier that fits your CPAP model to enhance your breathing comfort. Adjust the heat setting up if you get a dry nose or throat, down if you get condensation in the hose or mask.  Position the CPAP lower than you so that any condensation in the hose drains back into the machine rather than towards the mask.    Try a system that uses nasal pillows if traditional masks give you problems.    Clean your mask, tubing and headgear once a week. Make sure the equipment dries fully.    Regularly check and replace the filters for your CPAP unit and humidifier.    Work closely with your sleep provider and your CPAP supplier to make sure that you have the machine, mask and air pressure setting that works best for you. It is better to stop using it and call your provider to solve problems than to lay awake all night frustrated with the device.    Weight Loss:    Weight loss decreases severity of sleep apnea in most people with obesity. For those with mild obesity who have developed snoring with weight gain, even 15-30 pound weight loss can improve and occasionally eliminate sleep apnea.  Structured and life-long dietary and health habits are necessary to lose weight and keep healthier weight levels.     Though there are significant health benefits from weight loss, long-term weight loss is very difficult to achieve- studies show  success with dietary management in less than 10% of people. In addition, substantial weight loss may require years of dietary control and may be difficult if patients have severe obesity. In these cases, surgical management may be considered.    If you are interested in methods for weight loss, you should review the options discussed at the National Institutes of Health patient information sites:     http://win.niddk.nih.gov/publications/index.htm  http:/www.health.nih.gov/topic/WeightLossDieting    Bariatric programs offer counseling in all methods of weight loss:    Http:/www.[facility].org/Specialties/WeightLossSurgeryandMedicalMgmt/htm    Your BMI is Body mass index is 30.11 kg/m .    Weight management plan: Patient was referred to their PCP to discuss a diet and exercise plan.    Body mass index (BMI) is one way to tell whether you are at a healthy weight, overweight, or obese. It measures your weight in relation to your height.  A BMI of 18.5 to 24.9 is in the healthy range. A person with a BMI of 25 to 29.9 is considered overweight, and someone with a BMI of 30 or greater is considered obese.  Another way to find out if you are at risk for health problems caused by overweight and obesity is to measure your waist. If you are a woman and your waist is more than 35 inches, or if you are a man and your waist is more than 40 inches, your risk of disease may be higher.  More than two-thirds of American adults are considered overweight or obese. Being overweight or obese increases the risk for further weight gain.  Excess weight may lead to heart disease and diabetes. Creating and following plans for healthy eating and physical activity may help you improve your health.    Methods for maintaining or losing weight.    Weight control is part of healthy lifestyle and includes exercise, emotional health, and healthy eating habits.  Careful eating habits lifelong is the mainstay of weight control.  Though there  are significant health benefits from weight loss, long-term weight loss with diet alone may be very difficult to achieve- studies show long-term success with dietary management in less than 10% of people. Attaining a healthy weight may be especially difficult to achieve in those with severe obesity. In some cases, medications, devices and surgical management might be considered.    What can you do?    If you are overweight or obese and are interested in methods for weight loss, you should discuss this with your provider. In addition, we recommend that you review healthy life styles and methods for weight loss available through the National Institutes of Health patient information sites:     http://win.niddk.nih.gov/publications/index.htm

## 2021-10-21 NOTE — PROGRESS NOTES
"Salas Clinton is a 51 year old male being evaluated via a billable telephone visit.     \"This telephone visit will be conducted via a call between you and your physician/provider. We have found that certain health care needs can be provided without the need for an in-person visit or physical exam.  This service lets us provide the care you need with a telephone conversation.  If a prescription is necessary we can send it directly to your pharmacy.  If lab work is needed we can place an order for that and you can then stop by our lab to have the test done at a later time.\"    Telephone visits are billed at different rates depending on your insurance coverage.  Please reach out to your insurance provider with any questions.    Patient has given verbal consent for  a Telephone visit? Yes    What telephone number would you like your provider to contact at at:  [phone]    How would you like to obtain your AVS? Mail a copy    Minh Camp MA    Telephone Visit Details:     Telephone Visit Start Time: 1:02 PM    Telephone Visit End Time:  1:08 PM      Sleep Study Follow-Up Visit:    Due to language barrier, a Sheree  was present during the discussion with this patient.    Date on this visit: 10/22/2021    Salas Clinton comes in today for follow-up of his sleep study done on 10/05/2021 at the [facility] for possible sleep apnea.    Sleep latency 10.5 minutes without Ambien.  REM achieved.   REM latency 276.0 minutes.  Sleep efficiency 76.6%. Total sleep time 333.0 minutes.    Sleep architecture:  Stage 1, 49.8% (5%), stage 2, 34.1% (45-55%), stage 3, 0.0% (15-20%), stage REM, 16.1% (20-25%). AHI was 111.0, with desaturations down to 77.7%. Time spent less than or equal to 88% was 5.9 minutes.  .7.  REM .4, consistent with severe REM BUFFY.  Supine .2, consistent with severe SUPINE BUFFY.  Periodic Limb Movement Index -/hour.         These findings were reviewed with " patient.     Past medical/surgical history, family history, social history, medications and allergies were reviewed.      Problem List:  Patient Active Problem List    Diagnosis Date Noted     Obstructive sleep apnea 06/01/2021     Priority: Medium     Gastroesophageal reflux disease, unspecified whether esophagitis present 02/11/2021     Priority: Medium     Restless legs syndrome 12/04/2019     Priority: Medium     Nodule of upper lobe of left lung 06/13/2019     Priority: Medium     Alcohol abuse 11/12/2018     Priority: Medium     Subclinical hypothyroidism 07/09/2018     Priority: Medium     Benign adenomatous polyp of large intestine 09/07/2017     Priority: Medium     Elevated liver enzymes 09/07/2017     Priority: Medium     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     Priority: Medium     Alcoholic hepatitis 04/12/2017     Priority: Medium     Maxillary sinusitis, acute 04/09/2017     Priority: Medium     Insomnia 04/09/2017     Priority: Medium     Hemorrhoids      Priority: Medium     Created by Conversion  Replacement Utility updated for latest IMO load         Hematochezia      Priority: Medium     Hypokalemia 04/05/2017     Priority: Medium     Hyponatremia 04/05/2017     Priority: Medium     Nicotine Dependence      Priority: Medium     Created by Conversion         Serum Enzyme Levels - AST (SGOT) Elevated      Priority: Medium     Created by Conversion         Gastritis Due To H. Pylori      Priority: Medium     Created by Conversion  Replacement Utility updated for latest IMO load         Abdominal Pain      Priority: Medium     Created by Conversion  Replacement Utility updated for latest IMO load         Bone Cyst      Priority: Medium     Created by Conversion  Replacement Utility updated for latest IMO load         Hypercholesteremia 07/09/2015     Priority: Medium     Gastritis 07/09/2015     Priority: Medium     Limb Pain      Priority: Medium     Created by Conversion         Accessory Navicular       Priority: Medium     Created by Conversion         Lower Back Pain      Priority: Medium     Created by Conversion          Current Outpatient Medications   Medication     acetaminophen (TYLENOL) 325 MG tablet     omeprazole (PRILOSEC) 20 MG capsule     pramipexole (MIRAPEX) 0.25 MG tablet     No current facility-administered medications for this visit.         Impression/Plan:  1. BUFFY (obstructive sleep apnea)  2. Hypoxemia associated with sleep  - Comprehensive DME    This is a pleasant 51-year-old male here to follow-up on recent sleep study results which showed very severe obstructive sleep apnea with sleep associated hypoxemia.    All potential therapeutic options including positive airway pressure, mandibular advancing oral appliances, and surgical options were discussed. Also counseled about impact of weight loss of BUFFY.     The patient elected APAP device with pressure setting 5-15 cm H2O, mask of choice, and PAP supplies sent to [facility].    He will follow up with me in about 2 month(s) after obtaining his new APAP device for review of download data and use/compliance.     15 minutes were spent on the date of the encounter doing chart review, history and exam, documentation and further activities as noted above.       ERICA Martinez CNP  Sleep Medicine      CC: Boaz Bell     This note was written with the assistance of the Dragon voice-dictation technology software. The final document, although reviewed, may contain errors. For corrections, please contact the office.

## 2021-10-22 ENCOUNTER — VIRTUAL VISIT (OUTPATIENT)
Dept: SLEEP MEDICINE | Facility: CLINIC | Age: 51
End: 2021-10-22
Payer: COMMERCIAL

## 2021-10-22 DIAGNOSIS — G47.33 OSA (OBSTRUCTIVE SLEEP APNEA): Primary | ICD-10-CM

## 2021-10-22 DIAGNOSIS — G47.36 HYPOXEMIA ASSOCIATED WITH SLEEP: ICD-10-CM

## 2021-10-22 PROCEDURE — 99212 OFFICE O/P EST SF 10 MIN: CPT | Mod: 95 | Performed by: NURSE PRACTITIONER

## 2021-10-22 NOTE — PROGRESS NOTES
AVS has been mailed to patients home address October 22, 2021    Instructional letter for scheduling PAP follow up visit has been sent to patient via MAIL.      Cora LEIVA [facility]

## 2021-10-29 ENCOUNTER — TELEPHONE (OUTPATIENT)
Dept: SLEEP MEDICINE | Facility: CLINIC | Age: 51
End: 2021-10-29

## 2021-11-03 ENCOUNTER — DOCUMENTATION ONLY (OUTPATIENT)
Dept: SLEEP MEDICINE | Facility: CLINIC | Age: 51
End: 2021-11-03

## 2021-11-03 NOTE — PROGRESS NOTES
Patient was offered choice of vendor and chose [facility].  Patient Salas Clinton was set up at [place] on November 3, 2021. Patient received a Resmed Airsense 10 Pressures were set at 5-15 cm H2O.   Patient s ramp is 5 cm H2O for Auto and FLEX/EPR is 2.  Patient received a Resmed Mask name: AIRFIT F20  Full Face mask size Large, heated tubing and heated humidifier.  Patient does need to meet compliance.    Felicia Purvis

## 2021-11-08 ENCOUNTER — DOCUMENTATION ONLY (OUTPATIENT)
Dept: SLEEP MEDICINE | Facility: CLINIC | Age: 51
End: 2021-11-08
Payer: COMMERCIAL

## 2021-11-08 NOTE — PROGRESS NOTES
3 day Sleep therapy management telephone visit    Diagnostic AHI: 111.0    PSG    Confirmed with patient at time of call- Yes Patient is still interested in STM service       Subjective measures:   Spoke with patients family member and  things are going really well.  Device is working with no problems.         Objective data     Order Settings for PAP  CPAP min 5    CPAP max 15        Device settings from machine CPAP min 5.0     CPAP max 15.0      EPR Setting TWO    RESMED soft response  OFF     Assessment: Nightly usage over four hours      Action plan: Patient to have 14 day STM visit. Patient has a follow up visit scheduled:   no, but is required by insurance for compliance unable to schedule at this time.     Replacement device: No  STM ordered by provider: Yes     Total time spent on accessing and  interpreting remote patient PAP therapy data  10 minutes    Total time spent counseling, coaching  and reviewing PAP therapy data with patient  2 minutes    [id number] no

## 2021-11-18 ENCOUNTER — DOCUMENTATION ONLY (OUTPATIENT)
Dept: SLEEP MEDICINE | Facility: CLINIC | Age: 51
End: 2021-11-18
Payer: COMMERCIAL

## 2021-11-18 NOTE — PROGRESS NOTES
14  DAY STM VISIT    Diagnostic AHI: 111.0    PSG    Subjective measures:   Family member reports that things are going well.  They are in the middle of a move and he has not used for a few days.  He will be resuming use tonight.     Assessment: Pt not meeting objective benchmarks for compliance       Action plan: pt to have 30 day STM visit.      Device type: Auto-CPAP    PAP settings: CPAP min 5.0 cm  H20       CPAP max 15.0 cm  H20            95th% pressure 10.8 cm  H20        RESMED EPR level Setting: TWO    RESMED Soft response setting:  OFF    Mask type:  Per patient choice    Objective measures: 14 day rolling measures      Compliance  33 %      Leak  51.6  lpm  last  upload      AHI 7.84   last  upload      Average number of minutes 174      Objective measure goal  Compliance   Goal >70%  Leak   Goal < 24 lpm  AHI  Goal < 5  Usage  Goal >240        Total time spent on accessing and interpreting remote patient PAP therapy data  10 minutes    Total time spent counseling, coaching  and reviewing PAP therapy data with patient 4minutes    [id number]  [id number]  no (3 day STM)

## 2021-12-06 ENCOUNTER — DOCUMENTATION ONLY (OUTPATIENT)
Dept: SLEEP MEDICINE | Facility: CLINIC | Age: 51
End: 2021-12-06
Payer: COMMERCIAL

## 2021-12-06 NOTE — PROGRESS NOTES
30 DAY [facility] VISIT    Diagnostic AHI: 111.0    PSG    Message left for patient to return call     Assessment: Pt not meeting objective benchmarks for compliance     Action plan: 2 week STM recheck appt scheduled  Patient has not scheduled a follow up visit, but it is required for insurance     Device type: Auto-CPAP  PAP settings: CPAP min 5.0 cm  H20     CPAP max 15.0 cm  H20     95th% pressure 10.5 cm  H20      RESMED EPR level Setting: TWO    RESMED Soft response setting:  OFF  Mask type:  Per patient choice  Objective measures: 14 day rolling measures      Compliance  0 %      Leak  32.4 lpm  last  upload      AHI 5.03   last  upload      Average number of minutes 23      Objective measure goal  Compliance   Goal >70%  Leak   Goal < 24 lpm  AHI  Goal < 5  Usage  Goal >240        Total time spent on accessing and interpreting remote patient PAP therapy data  10 minutes    Total time spent counseling, coaching  and reviewing PAP therapy data with patient  0 minutes     [id number] this call  [id number] no  at 3 or 14 day [facility]

## 2021-12-29 ENCOUNTER — DOCUMENTATION ONLY (OUTPATIENT)
Dept: SLEEP MEDICINE | Facility: CLINIC | Age: 51
End: 2021-12-29
Payer: COMMERCIAL

## 2021-12-29 NOTE — PROGRESS NOTES
PERCENTAGE OF USAGE:  12/29/2021- JL- 0% COMPLIANT- 30 DAY MASK EXCHANGE SCHEDULED FOR 1/4/22 IN [facility] AT 10AM. HE WANTS TO TRY PILLOW MASK WITH A CHINSTRAP.

## 2022-01-05 PROBLEM — D12.6 BENIGN ADENOMATOUS POLYP OF LARGE INTESTINE: Status: ACTIVE | Noted: 2017-09-07

## 2022-01-10 ENCOUNTER — OFFICE VISIT (OUTPATIENT)
Dept: FAMILY MEDICINE | Facility: CLINIC | Age: 52
End: 2022-01-10
Payer: COMMERCIAL

## 2022-01-10 VITALS
BODY MASS INDEX: 33.49 KG/M2 | SYSTOLIC BLOOD PRESSURE: 134 MMHG | HEART RATE: 84 BPM | OXYGEN SATURATION: 98 % | TEMPERATURE: 98.1 F | DIASTOLIC BLOOD PRESSURE: 76 MMHG | WEIGHT: 189 LBS | HEIGHT: 63 IN

## 2022-01-10 DIAGNOSIS — F17.200 TOBACCO USE DISORDER: ICD-10-CM

## 2022-01-10 DIAGNOSIS — R91.1 NODULE OF UPPER LOBE OF LEFT LUNG: ICD-10-CM

## 2022-01-10 DIAGNOSIS — K13.21 ORAL LEUKOPLAKIA: ICD-10-CM

## 2022-01-10 DIAGNOSIS — Z23 IMMUNIZATION DUE: ICD-10-CM

## 2022-01-10 DIAGNOSIS — K14.0 TONGUE ULCERATION: Primary | ICD-10-CM

## 2022-01-10 DIAGNOSIS — Z23 NEED FOR IMMUNIZATION AGAINST INFLUENZA: ICD-10-CM

## 2022-01-10 PROCEDURE — 90471 IMMUNIZATION ADMIN: CPT | Performed by: FAMILY MEDICINE

## 2022-01-10 PROCEDURE — 99214 OFFICE O/P EST MOD 30 MIN: CPT | Mod: 25 | Performed by: FAMILY MEDICINE

## 2022-01-10 PROCEDURE — 90682 RIV4 VACC RECOMBINANT DNA IM: CPT | Performed by: FAMILY MEDICINE

## 2022-01-10 PROCEDURE — 91306 COVID-19,PF,MODERNA (18+ YRS BOOSTER .25ML): CPT | Performed by: FAMILY MEDICINE

## 2022-01-10 PROCEDURE — 0064A COVID-19,PF,MODERNA (18+ YRS BOOSTER .25ML): CPT | Performed by: FAMILY MEDICINE

## 2022-01-10 ASSESSMENT — MIFFLIN-ST. JEOR: SCORE: 1617.43

## 2022-01-10 NOTE — PROGRESS NOTES
"OUTPATIENT VISIT NOTE                                                   Date of Visit: 1/10/2022     Chief Complaint   Patient presents with:  Mouth Problem: pain under tongue            History of Present Illness   Salas Clinton is a 51 year old male with  by phone c/o sore under tongue for the last 3 weeks.  It is painful.  He took some antibiotic and thinks it has improved since taking that.    H/o nodule of upper lobe of left lung - Last CT 2019--needed follow up one year later.  Smokes 3 cigarettes daily for years--since age 13.  Has noted no lumps in neck.  No weight loss.  Quit drinking alcohol--about eight months ago.         MEDICATIONS   Current Outpatient Medications   Medication     acetaminophen (TYLENOL) 325 MG tablet     omeprazole (PRILOSEC) 20 MG capsule     pramipexole (MIRAPEX) 0.25 MG tablet     No current facility-administered medications for this visit.         SOCIAL HISTORY   Social History     Tobacco Use     Smoking status: Current Every Day Smoker     Smokeless tobacco: Never Used     Tobacco comment: [place] without Tobacco   Substance Use Topics     Alcohol use: No     Comment: Alcoholic Drinks/day: no longer drinking since 4/1/2017           Physical Exam   Vitals:    01/10/22 1328   BP: 134/76   BP Location: Left arm   Patient Position: Sitting   Cuff Size: Adult Large   Pulse: 84   Temp: 98.1  F (36.7  C)   TempSrc: Oral   SpO2: 98%   Weight: 85.7 kg (189 lb)   Height: 1.6 m (5' 3\")        GENERAL:   Alert. Oriented.  EYES: Clear  HENT:  Ears: R TM pearly gray. Normal landmarks. L TM pearly gray.  Normal landmarks  Nose: Clear.  Sinuses: Nontender.  Oropharynx: edentulous  Leukoplakia along left anterior gum, ulceration under left side of tongue--about 3mm.  No nodules palpated.  NECK: Supple. No adenopathy.  LUNGS: Clear to ascultation.  No crackles.  No wheezing  HEART: RRR  SKIN:  No rash.          Assessment and Plan     Tongue ulceration  - Otolaryngology Referral  Oral " leukoplakia  - Otolaryngology Referral    Ulceration under left side of tongue, by his report it is improving but I am uncertain as to how large it was originally .  In addition, he does have leukoplakia along the gum line.  Has long history of smoking--although amount is small--3 cigarettes daily.  Has h/o of alcohol abuse--currently not drinking for 8 months.  Has nodule in upper left lung that by CT 2.5 years ago that was unchanged from a previous CT and was felt to likely be sequela from prior infection or inflammation.  However, due to language barriers and possibility of not follow up will send to ENT for evaluation    Nodule of upper lobe of left lung  See notation above.  Recommendation in 9/2019 was repeat chest CT in one year so this will be obtained now  - CT Chest w/o Contrast    Nicotine Dependence  Advised to quit smoking    Immunization due  given  - COVID-19,PF,MODERNA (18+ YRS BOOSTER .25ML)    Need for immunization against influenza  given  - INFLUENZA QUAD, PF (RIV4) (FLUBLOK)             Follow up with primary after CT completed.      Discussed signs / symptoms that warrant urgent / emergent medical attention.     Recheck if worsening or not improving.       Cristobal Matute MD          Pertinent History     The following portions of the patient's history were reviewed and updated as appropriate: allergies, current medications, past family history, past medical history, past social history, past surgical history and problem list.

## 2022-01-23 ENCOUNTER — HOSPITAL ENCOUNTER (OUTPATIENT)
Dept: CT IMAGING | Facility: HOSPITAL | Age: 52
Discharge: HOME | End: 2022-01-23
Attending: FAMILY MEDICINE | Admitting: FAMILY MEDICINE
Payer: COMMERCIAL

## 2022-01-23 DIAGNOSIS — R91.1 NODULE OF UPPER LOBE OF LEFT LUNG: ICD-10-CM

## 2022-01-23 PROCEDURE — 71250 CT THORAX DX C-: CPT

## 2022-02-24 ENCOUNTER — OFFICE VISIT (OUTPATIENT)
Dept: FAMILY MEDICINE | Facility: CLINIC | Age: 52
End: 2022-02-24
Payer: COMMERCIAL

## 2022-02-24 VITALS
BODY MASS INDEX: 32.6 KG/M2 | WEIGHT: 184 LBS | HEIGHT: 63 IN | HEART RATE: 79 BPM | SYSTOLIC BLOOD PRESSURE: 130 MMHG | TEMPERATURE: 98.1 F | DIASTOLIC BLOOD PRESSURE: 78 MMHG | OXYGEN SATURATION: 98 % | RESPIRATION RATE: 20 BRPM

## 2022-02-24 DIAGNOSIS — K29.50 CHRONIC GASTRITIS WITHOUT BLEEDING, UNSPECIFIED GASTRITIS TYPE: ICD-10-CM

## 2022-02-24 DIAGNOSIS — G47.33 OBSTRUCTIVE SLEEP APNEA: ICD-10-CM

## 2022-02-24 DIAGNOSIS — R91.1 NODULE OF LEFT LUNG: Primary | ICD-10-CM

## 2022-02-24 DIAGNOSIS — R61 NIGHT SWEATS: ICD-10-CM

## 2022-02-24 DIAGNOSIS — F17.200 TOBACCO USE DISORDER: ICD-10-CM

## 2022-02-24 DIAGNOSIS — K12.1 ORAL ULCER: ICD-10-CM

## 2022-02-24 DIAGNOSIS — N20.0 NEPHROLITHIASIS: ICD-10-CM

## 2022-02-24 DIAGNOSIS — K21.9 GASTROESOPHAGEAL REFLUX DISEASE, UNSPECIFIED WHETHER ESOPHAGITIS PRESENT: ICD-10-CM

## 2022-02-24 PROCEDURE — 99214 OFFICE O/P EST MOD 30 MIN: CPT | Performed by: FAMILY MEDICINE

## 2022-02-24 NOTE — PROGRESS NOTES
ASSESMENT AND PLAN:  Diagnoses and all orders for this visit:  Nodule of left lung  Reviewed recent CT results with the patient.  There has been some evolution of the left upper lung nodule, possibly concerning as detailed in the note.  Discussed options with the patient including repeat CT scan in 6 months versus referral to the lung nodule clinic.  He elects for the latter.  Patient would be willing to consider a biopsy if necessary.  -     Adult Oncology/Hematology Referral; Future  Nephrolithiasis  This was an incidental finding on his CT scan.  Small and nonobstructive.  Observation.  Obstructive sleep apnea  Patient did not tolerate the initial trial of CPAP.  He is awaiting a new type of machine.  Patient's daughter is going to check with the sleep clinic on this.  Oral ulcer  Resolved completely.  Observation.  Nicotine Dependence  Quit plan counseling done with the patient.  Night sweats  Negative evaluation at the TB clinic.  See that note for details.  Chronic gastritis without bleeding, unspecified gastritis type  -     omeprazole (PRILOSEC) 20 MG DR capsule; Take 1 capsule (20 mg) by mouth every morning  Follow-up if symptoms do not resolve with this treatment.        Reviewed the risks and benefits of the treatment plan with the patient and/or caregiver and we discussed indications for routine and emergent follow-up.        SUBJECTIVE: 51-year-old male in with several concerns.  He has a known history of lung nodules and had his follow-up CT scan recently.  There was some subtle change in the left upper lung nodule.  Same CT scan showed a small nonobstructive right kidney stone.  Patient has not been having flank pain or hematuria.  Since I saw the patient last here in the clinic, he did complete a sleep apnea evaluation and was diagnosed with sleep apnea.  However, he did not tolerate his initial trial of CPAP.  It was recommended that he switch to a different type of machine but he has not received  "that yet.  Patient continues to smoke, currently cut down to 2 cigarettes/day.  Patient reports that he has been getting some burning mild discomfort in his epigastric area.  It has been associated with some sour stomach taste in the back of his throat.  He has been taking over-the-counter antacid regularly over the last few weeks for this.  No blood in the stool or black tarry stools.    Past Medical History:   Diagnosis Date     GERD (gastroesophageal reflux disease)      Hematochezia      Hepatitis     ETOH     Hyperlipidemia      Lower Back Pain     Created by Conversion      Seizure (H)      Patient Active Problem List   Diagnosis     Nicotine Dependence     Hemorrhoids     Serum Enzyme Levels - AST (SGOT) Elevated     Accessory Navicular     Gastritis Due To H. Pylori     Bone Cyst     Hypercholesteremia     Gastritis     Insomnia     Alcoholic hepatitis     Benign adenomatous polyp of large intestine     Elevated liver enzymes     Oral leukoplakia     Subclinical hypothyroidism     Alcohol abuse     Nodule of upper lobe of left lung     Restless legs syndrome     Gastroesophageal reflux disease, unspecified whether esophagitis present     Obstructive sleep apnea     Nephrolithiasis     Current Outpatient Medications   Medication Sig Dispense Refill     omeprazole (PRILOSEC) 20 MG DR capsule Take 1 capsule (20 mg) by mouth every morning 30 capsule 11     acetaminophen (TYLENOL) 325 MG tablet [ACETAMINOPHEN (TYLENOL) 325 MG TABLET] Take 2 tablets (650 mg total) by mouth every 6 (six) hours as needed for pain (takes 2 at a time). (Patient not taking: Reported on 2/24/2022) 90 tablet 2     History   Smoking Status     Current Every Day Smoker   Smokeless Tobacco     Never Used     Comment: Betelnut without Tobacco       OBJECTICE: /78   Pulse 79   Temp 98.1  F (36.7  C) (Oral)   Resp 20   Ht 1.6 m (5' 3\")   Wt 83.5 kg (184 lb)   SpO2 98%   BMI 32.59 kg/m       No results found for this or any " previous visit (from the past 24 hour(s)).     HEENT-reviewed the previous clinic note from my partner for the description and location of the oral ulcer.  By examination and palpation there is no residual ulcer or concerns on exam today and it appears that it has healed completely.   CV-regular rate and rhythm with no murmur   RESP-lungs clear to auscultation   ABDOMINAL-soft, nontender, no palpable masses       Boaz Bell MD

## 2022-03-22 ENCOUNTER — TELEPHONE (OUTPATIENT)
Dept: PULMONOLOGY | Facility: CLINIC | Age: 52
End: 2022-03-22
Payer: COMMERCIAL

## 2022-03-22 NOTE — TELEPHONE ENCOUNTER
I spoke to the patient wife about rescheduling and need to follow up with the [facility] for Dr. Goode next available. Will call the patient back.

## 2022-03-28 DIAGNOSIS — R91.1 NODULE OF UPPER LOBE OF LEFT LUNG: Primary | ICD-10-CM

## 2022-03-29 ENCOUNTER — OFFICE VISIT (OUTPATIENT)
Dept: PULMONOLOGY | Facility: CLINIC | Age: 52
End: 2022-03-29
Attending: INTERNAL MEDICINE
Payer: COMMERCIAL

## 2022-03-29 VITALS
TEMPERATURE: 98.2 F | WEIGHT: 182 LBS | HEIGHT: 62 IN | HEART RATE: 109 BPM | BODY MASS INDEX: 33.49 KG/M2 | SYSTOLIC BLOOD PRESSURE: 120 MMHG | DIASTOLIC BLOOD PRESSURE: 79 MMHG | OXYGEN SATURATION: 99 %

## 2022-03-29 DIAGNOSIS — R91.1 NODULE OF LEFT LUNG: ICD-10-CM

## 2022-03-29 PROCEDURE — G0463 HOSPITAL OUTPT CLINIC VISIT: HCPCS

## 2022-03-29 PROCEDURE — 99204 OFFICE O/P NEW MOD 45 MIN: CPT | Performed by: INTERNAL MEDICINE

## 2022-03-29 ASSESSMENT — PAIN SCALES - GENERAL: PAINLEVEL: MODERATE PAIN (5)

## 2022-03-29 NOTE — LETTER
3/29/2022     RE: Salas Clinton  [address]  [place] [address]     Dear Colleague,    Thank you for referring your patient, Salas Clinton, to the [facility] at [facility]. Please see a copy of my visit note below.    LUNG NODULE & INTERVENTIONAL PULMONARY CLINIC  CLINICS & SURGERY [facility]     Salas Clinton MRN# [id number]   Age: 51 year old YOB: 1970     Reason for Consultation: lung nodule(s)     Assessment and Plan:    1. New multiple pulmonary lung nodule(s). Given the characteristics on current/previous imaging and risk factors; I would classify this to be Intermediate (6-65%) risk for cancer. Has two nodules in LEONOR which one is calcified. The other is in the apical segment and is the largest at 7mm. We will proceed with Fleischner and repeat CT in 6mo    2. Tobacco use. Trying to quit on his own    Billing: I spent 45-59min (New, 51506) on the date of the encounter doing chart review, history and exam, documentation and further activities as described in this note.    Lloyd Goode MD   of Medicine  Interventional Pulmonology  Department of Pulmonary, Allergy, Critical Care and Sleep Medicine   [facility]  Pager: [phone]          History:     Salas Clinton is a 51 year old male with sig h/o for GERD, Hepatitis, Seizure who is here for evaluation/followup of lung nodule(s).    - No new resp sx or complaints. Denies dyspnea or cough.   - had CT chest for chest pain and found incidental nodules   - Personal hx of cancer: No.   - Family hx of cancer: No lung cancer  - Exposure hx: Denies asbestos or radon exposure   - Tobacco hx: Current Smoker: 1ppd since 8yo. Has been down to 3 cig/d since 11/2021   - My interpretation of the images relevant for this visit includes: nodules   - My interpretation of the PFT's relevant for this visit  includes: None     Culprit Nodule(s):   1. LEONOR 6 mm (series 3, images 39-41).  2. LEONOR 7 x 4 mm (series 3, image 20).   3. Stable small left major fissure pleural nodule (image 60).    Other active medical problems include:   - Has GERD. Stable    - Has hepatitis. Stable            Past Medical History:      Past Medical History:   Diagnosis Date     GERD (gastroesophageal reflux disease)      Hematochezia      Hepatitis     ETOH     Hyperlipidemia      Lower Back Pain     Created by Conversion      Seizure (H)            Past Surgical History:      Past Surgical History:   Procedure Laterality Date     FOOT SURGERY Right           Social History:     Social History     Tobacco Use     Smoking status: Current Every Day Smoker     Smokeless tobacco: Never Used     Tobacco comment: Betelnut without Tobacco   Substance Use Topics     Alcohol use: No     Comment: Alcoholic Drinks/day: no longer drinking since 4/1/2017          Family History:     Family History   Problem Relation Age of Onset     Snoring Father            Allergies:      Allergies   Allergen Reactions     Folic Acid Unknown          Medications:     Current Outpatient Medications   Medication Sig     acetaminophen (TYLENOL) 325 MG tablet [ACETAMINOPHEN (TYLENOL) 325 MG TABLET] Take 2 tablets (650 mg total) by mouth every 6 (six) hours as needed for pain (takes 2 at a time).     omeprazole (PRILOSEC) 20 MG DR capsule Take 1 capsule (20 mg) by mouth every morning     No current facility-administered medications for this visit.          Review of Systems:     CONSTITUTIONAL: negative for fever, chills, change in weight  INTEGUMENTARY/SKIN: no rash or obvious new lesions  ENT/MOUTH: no sore throat, new sinus pain or nasal drainage  RESP: see interval history  CV: negative for chest pain, palpitations or peripheral edema  GI: no nausea, vomiting, change in stools  : no dysuria  MUSCULOSKELETAL: no myalgias, arthralgias  ENDOCRINE: blood sugars with adequate  control  PSYCHIATRIC: mood stable  LYMPHATIC: no new lymphadenopathy  HEME: no bleeding or easy bruisability  NEURO: no numbness, weakness, headaches         Physical Exam:     Constitutional - looks well, in no apparent distress  Eyes - no redness or discharge  Respiratory -breathing appears comfortable. No wheeze or rhonchi.   Cardiac -- Normal rate, rhythm.   Skin - No appreciable discoloration or lesions (very limited exam)  Neurological - No apparent tremors. Speech fluent and articlate  Psychiatric - no signs of delirium or anxiety          Current Laboratory Data:   All laboratory and imaging data reviewed.           Previous Cardiology Imaging   No results found for this or any previous visit (from the past 8760 hour(s)).           Again, thank you for allowing me to participate in the care of your patient.      Sincerely,    Lloyd Goode MD

## 2022-03-29 NOTE — NURSING NOTE
"Oncology Rooming Note    March 29, 2022 8:27 AM   Salas Clinton is a 51 year old male who presents for:    Chief Complaint   Patient presents with     Oncology Clinic Visit     nodule of left lung     Initial Vitals: /79   Pulse 109   Temp 98.2  F (36.8  C) (Oral)   Ht 1.585 m (5' 2.4\")   Wt 82.6 kg (182 lb)   SpO2 99%   BMI 32.86 kg/m   Estimated body mass index is 32.86 kg/m  as calculated from the following:    Height as of this encounter: 1.585 m (5' 2.4\").    Weight as of this encounter: 82.6 kg (182 lb). Body surface area is 1.91 meters squared.  Moderate Pain (5) Comment: Data Unavailable   No LMP for male patient.  Allergies reviewed: Yes  Medications reviewed: Yes    Medications: Medication refills not needed today.  Pharmacy name entered into [facility]: [id number] [place], [place] - [address] AT [facility] [address]    Clinical concerns: none       Dixie Nunes CMA            "

## 2022-03-29 NOTE — PROGRESS NOTES
LUNG NODULE & INTERVENTIONAL PULMONARY CLINIC  [facility]     Salas Clinton MRN# [id number]   Age: 51 year old YOB: 1970     Reason for Consultation: lung nodule(s)     Assessment and Plan:    1. New multiple pulmonary lung nodule(s). Given the characteristics on current/previous imaging and risk factors; I would classify this to be Intermediate (6-65%) risk for cancer. Has two nodules in LEONRO which one is calcified. The other is in the apical segment and is the largest at 7mm. We will proceed with Fleischner and repeat CT in 6mo    2. Tobacco use. Trying to quit on his own    Billing: I spent 45-59min (New, 82785) on the date of the encounter doing chart review, history and exam, documentation and further activities as described in this note.    Lloyd Goode MD   of Medicine  Interventional Pulmonology  Department of Pulmonary, Allergy, Critical Care and Sleep Medicine   [facility]  Pager: [phone]          History:     Salas Clinton is a 51 year old male with sig h/o for GERD, Hepatitis, Seizure who is here for evaluation/followup of lung nodule(s).    - No new resp sx or complaints. Denies dyspnea or cough.   - had CT chest for chest pain and found incidental nodules   - Personal hx of cancer: No.   - Family hx of cancer: No lung cancer  - Exposure hx: Denies asbestos or radon exposure   - Tobacco hx: Current Smoker: 1ppd since 10yo. Has been down to 3 cig/d since 11/2021   - My interpretation of the images relevant for this visit includes: nodules   - My interpretation of the PFT's relevant for this visit includes: None     Culprit Nodule(s):   1. LEONOR 6 mm (series 3, images 39-41).  2. LEONOR 7 x 4 mm (series 3, image 20).   3. Stable small left major fissure pleural nodule (image 60).    Other active medical problems include:   - Has GERD. Stable    - Has hepatitis. Stable            Past  Medical History:      Past Medical History:   Diagnosis Date     GERD (gastroesophageal reflux disease)      Hematochezia      Hepatitis     ETOH     Hyperlipidemia      Lower Back Pain     Created by Conversion      Seizure (H)            Past Surgical History:      Past Surgical History:   Procedure Laterality Date     FOOT SURGERY Right           Social History:     Social History     Tobacco Use     Smoking status: Current Every Day Smoker     Smokeless tobacco: Never Used     Tobacco comment: Betelnut without Tobacco   Substance Use Topics     Alcohol use: No     Comment: Alcoholic Drinks/day: no longer drinking since 4/1/2017          Family History:     Family History   Problem Relation Age of Onset     Snoring Father            Allergies:      Allergies   Allergen Reactions     Folic Acid Unknown          Medications:     Current Outpatient Medications   Medication Sig     acetaminophen (TYLENOL) 325 MG tablet [ACETAMINOPHEN (TYLENOL) 325 MG TABLET] Take 2 tablets (650 mg total) by mouth every 6 (six) hours as needed for pain (takes 2 at a time).     omeprazole (PRILOSEC) 20 MG DR capsule Take 1 capsule (20 mg) by mouth every morning     No current facility-administered medications for this visit.          Review of Systems:     CONSTITUTIONAL: negative for fever, chills, change in weight  INTEGUMENTARY/SKIN: no rash or obvious new lesions  ENT/MOUTH: no sore throat, new sinus pain or nasal drainage  RESP: see interval history  CV: negative for chest pain, palpitations or peripheral edema  GI: no nausea, vomiting, change in stools  : no dysuria  MUSCULOSKELETAL: no myalgias, arthralgias  ENDOCRINE: blood sugars with adequate control  PSYCHIATRIC: mood stable  LYMPHATIC: no new lymphadenopathy  HEME: no bleeding or easy bruisability  NEURO: no numbness, weakness, headaches         Physical Exam:     Constitutional - looks well, in no apparent distress  Eyes - no redness or discharge  Respiratory -breathing  appears comfortable. No wheeze or rhonchi.   Cardiac -- Normal rate, rhythm.   Skin - No appreciable discoloration or lesions (very limited exam)  Neurological - No apparent tremors. Speech fluent and articlate  Psychiatric - no signs of delirium or anxiety          Current Laboratory Data:   All laboratory and imaging data reviewed.           Previous Cardiology Imaging   No results found for this or any previous visit (from the past 8760 hour(s)).

## 2022-08-08 ENCOUNTER — TRANSFERRED RECORDS (OUTPATIENT)
Dept: HEALTH INFORMATION MANAGEMENT | Facility: CLINIC | Age: 52
End: 2022-08-08

## 2022-09-25 ENCOUNTER — HEALTH MAINTENANCE LETTER (OUTPATIENT)
Age: 52
End: 2022-09-25

## 2022-11-01 ENCOUNTER — OFFICE VISIT (OUTPATIENT)
Dept: FAMILY MEDICINE | Facility: CLINIC | Age: 52
End: 2022-11-01
Payer: COMMERCIAL

## 2022-11-01 VITALS
HEART RATE: 85 BPM | BODY MASS INDEX: 29.35 KG/M2 | OXYGEN SATURATION: 98 % | TEMPERATURE: 98.5 F | WEIGHT: 159.5 LBS | SYSTOLIC BLOOD PRESSURE: 126 MMHG | HEIGHT: 62 IN | DIASTOLIC BLOOD PRESSURE: 64 MMHG

## 2022-11-01 DIAGNOSIS — K70.9 ALCOHOLIC LIVER DAMAGE (H): ICD-10-CM

## 2022-11-01 DIAGNOSIS — F10.21 ALCOHOL DEPENDENCE IN REMISSION (H): ICD-10-CM

## 2022-11-01 DIAGNOSIS — K29.50 CHRONIC GASTRITIS WITHOUT BLEEDING, UNSPECIFIED GASTRITIS TYPE: Primary | ICD-10-CM

## 2022-11-01 DIAGNOSIS — E78.00 HYPERCHOLESTEREMIA: ICD-10-CM

## 2022-11-01 DIAGNOSIS — Z23 HIGH PRIORITY FOR 2019 NOVEL CORONAVIRUS VACCINATION: ICD-10-CM

## 2022-11-01 DIAGNOSIS — Z23 NEED FOR IMMUNIZATION AGAINST INFLUENZA: ICD-10-CM

## 2022-11-01 DIAGNOSIS — R51.9 GENERALIZED HEADACHES: ICD-10-CM

## 2022-11-01 DIAGNOSIS — R63.4 WEIGHT LOSS: ICD-10-CM

## 2022-11-01 LAB
ALBUMIN SERPL BCG-MCNC: 4.1 G/DL (ref 3.5–5.2)
ALP SERPL-CCNC: 51 U/L (ref 40–129)
ALT SERPL W P-5'-P-CCNC: 18 U/L (ref 10–50)
ANION GAP SERPL CALCULATED.3IONS-SCNC: 11 MMOL/L (ref 7–15)
AST SERPL W P-5'-P-CCNC: 30 U/L (ref 10–50)
BILIRUB SERPL-MCNC: 0.2 MG/DL
BUN SERPL-MCNC: 5 MG/DL (ref 6–20)
CALCIUM SERPL-MCNC: 9.2 MG/DL (ref 8.6–10)
CHLORIDE SERPL-SCNC: 103 MMOL/L (ref 98–107)
CHOLEST SERPL-MCNC: 168 MG/DL
CREAT SERPL-MCNC: 1.12 MG/DL (ref 0.67–1.17)
DEPRECATED HCO3 PLAS-SCNC: 23 MMOL/L (ref 22–29)
GFR SERPL CREATININE-BSD FRML MDRD: 80 ML/MIN/1.73M2
GLUCOSE SERPL-MCNC: 87 MG/DL (ref 70–99)
HBA1C MFR BLD: 5.3 % (ref 0–5.6)
HDLC SERPL-MCNC: 33 MG/DL
LDLC SERPL CALC-MCNC: 97 MG/DL
NONHDLC SERPL-MCNC: 135 MG/DL
POTASSIUM SERPL-SCNC: 3.9 MMOL/L (ref 3.4–5.3)
PROT SERPL-MCNC: 7.1 G/DL (ref 6.4–8.3)
SODIUM SERPL-SCNC: 137 MMOL/L (ref 136–145)
TRIGL SERPL-MCNC: 192 MG/DL

## 2022-11-01 PROCEDURE — 90471 IMMUNIZATION ADMIN: CPT | Performed by: FAMILY MEDICINE

## 2022-11-01 PROCEDURE — 99214 OFFICE O/P EST MOD 30 MIN: CPT | Mod: 25 | Performed by: FAMILY MEDICINE

## 2022-11-01 PROCEDURE — 90682 RIV4 VACC RECOMBINANT DNA IM: CPT | Performed by: FAMILY MEDICINE

## 2022-11-01 PROCEDURE — 80053 COMPREHEN METABOLIC PANEL: CPT | Performed by: FAMILY MEDICINE

## 2022-11-01 PROCEDURE — 36415 COLL VENOUS BLD VENIPUNCTURE: CPT | Performed by: FAMILY MEDICINE

## 2022-11-01 PROCEDURE — 0134A COVID-19,PF,MODERNA BIVALENT: CPT | Performed by: FAMILY MEDICINE

## 2022-11-01 PROCEDURE — 83036 HEMOGLOBIN GLYCOSYLATED A1C: CPT | Performed by: FAMILY MEDICINE

## 2022-11-01 PROCEDURE — 80061 LIPID PANEL: CPT | Performed by: FAMILY MEDICINE

## 2022-11-01 PROCEDURE — 91313 COVID-19,PF,MODERNA BIVALENT: CPT | Performed by: FAMILY MEDICINE

## 2022-11-01 RX ORDER — ACETAMINOPHEN 325 MG/1
650 TABLET ORAL EVERY 6 HOURS PRN
Qty: 90 TABLET | Refills: 3 | Status: SHIPPED | OUTPATIENT
Start: 2022-11-01 | End: 2023-05-02

## 2022-11-01 NOTE — PROGRESS NOTES
ASSESMENT AND PLAN:  Diagnoses and all orders for this visit:  Chronic gastritis without bleeding, unspecified gastritis type  Symptoms improved with omeprazole but not resolved.  Counseling done with the patient on options of the above a professional .  -     omeprazole (PRILOSEC) 20 MG DR capsule; Take 1 capsule (20 mg) by mouth every morning  -     Adult GI  Referral - Procedure Only; Future  Weight loss-over 20 pounds in the 8 months  -     Adult GI  Referral - Procedure Only; Future  -     Hemoglobin A1c; Future  -     Comprehensive metabolic panel (BMP + Alb, Alk Phos, ALT, AST, Total. Bili, TP); Future  Generalized headaches  Stable.-     acetaminophen (TYLENOL) 325 MG tablet; Take 2 tablets (650 mg) by mouth every 6 hours as needed  Alcoholic liver damage (H)  No active alcohol use.  Alcohol dependence in remission (H)-now over 1 year  Congratulated on his ongoing sobriety.  Need for immunization against influenza  -     INFLUENZA QUAD, RECOMBINANT, P-FREE (RIV4) (FLUBLOK) AGE 50-64 [KEN894]  High priority for 2019 novel coronavirus vaccination  -     COVID-19,PF,MODERNA BIVALENT 18+Yrs  Hypercholesteremia  -     Lipid panel reflex to direct LDL Non-fasting; Future      Reviewed the risks and benefits of the treatment plan with the patient and/or caregiver and we discussed indications for routine and emergent follow-up.        SUBJECTIVE: 52-year-old male has had improvement in his abdominal pain with omeprazole daily.  No vomiting, no blood in the stool, no black tarry stools.  The patient and his wife have noticed decreased appetite over the last several months and on review of his weight chart he has lost over 20 pounds in the last 8 months.  Patient had a colonoscopy done back in August, see that report for details but it does not look like he had upper endoscopy completed.  Patient has a past history of alcohol abuse and alcohol dependence but has been in remission now for  "over a year, no alcohol use at all in the past year.  Patient uses acetaminophen occasionally for headaches that have been stable and responded well to that medication.    Past Medical History:   Diagnosis Date     GERD (gastroesophageal reflux disease)      Hematochezia      Hepatitis     ETOH     Hyperlipidemia      Lower Back Pain     Created by Conversion      Seizure (H)      Patient Active Problem List   Diagnosis     Nicotine Dependence     Hemorrhoids     Serum Enzyme Levels - AST (SGOT) Elevated     Accessory Navicular     Gastritis Due To H. Pylori     Bone Cyst     Hypercholesteremia     Gastritis     Insomnia     Alcoholic liver damage (H)     Benign adenomatous polyp of large intestine     Elevated liver enzymes     Oral leukoplakia     Subclinical hypothyroidism     Alcohol abuse     Nodule of upper lobe of left lung     Restless legs syndrome     Gastroesophageal reflux disease, unspecified whether esophagitis present     Obstructive sleep apnea     Nephrolithiasis     Alcohol dependence in remission (H)     Current Outpatient Medications   Medication Sig Dispense Refill     acetaminophen (TYLENOL) 325 MG tablet Take 2 tablets (650 mg) by mouth every 6 hours as needed 90 tablet 3     omeprazole (PRILOSEC) 20 MG DR capsule Take 1 capsule (20 mg) by mouth every morning 90 capsule 3     History   Smoking Status     Every Day   Smokeless Tobacco     Never       OBJECTICE: /64 (BP Location: Right arm, Cuff Size: Adult Regular)   Pulse 85   Temp 98.5  F (36.9  C) (Oral)   Ht 1.578 m (5' 2.11\")   Wt 72.3 kg (159 lb 8 oz)   SpO2 98%   BMI 29.07 kg/m       Recent Results (from the past 24 hour(s))   Hemoglobin A1c    Collection Time: 11/01/22  1:38 PM   Result Value Ref Range    Hemoglobin A1C 5.3 0.0 - 5.6 %        GEN-alert, appropriate, in no apparent distress   CV-regular rate and rhythm with no murmur   RESP-lungs clear to auscultation   ABDOMINAL-soft, mild tenderness to palpation of the " epigastric area, no palpable masses, no guarding or rebound.   EXTREM-no pitting edema of the ankles        Boaz Bell MD

## 2022-11-08 ENCOUNTER — ANCILLARY PROCEDURE (OUTPATIENT)
Dept: CT IMAGING | Facility: CLINIC | Age: 52
End: 2022-11-08
Attending: INTERNAL MEDICINE
Payer: COMMERCIAL

## 2022-11-08 ENCOUNTER — OFFICE VISIT (OUTPATIENT)
Dept: PULMONOLOGY | Facility: CLINIC | Age: 52
End: 2022-11-08
Attending: INTERNAL MEDICINE
Payer: COMMERCIAL

## 2022-11-08 VITALS
RESPIRATION RATE: 16 BRPM | DIASTOLIC BLOOD PRESSURE: 72 MMHG | HEART RATE: 89 BPM | HEIGHT: 62 IN | BODY MASS INDEX: 29.39 KG/M2 | OXYGEN SATURATION: 100 % | WEIGHT: 159.7 LBS | SYSTOLIC BLOOD PRESSURE: 113 MMHG | TEMPERATURE: 98.4 F

## 2022-11-08 DIAGNOSIS — R91.1 NODULE OF LEFT LUNG: ICD-10-CM

## 2022-11-08 DIAGNOSIS — R91.1 NODULE OF LEFT LUNG: Primary | ICD-10-CM

## 2022-11-08 PROCEDURE — 71250 CT THORAX DX C-: CPT | Mod: GC | Performed by: RADIOLOGY

## 2022-11-08 PROCEDURE — 99214 OFFICE O/P EST MOD 30 MIN: CPT | Performed by: INTERNAL MEDICINE

## 2022-11-08 PROCEDURE — G0463 HOSPITAL OUTPT CLINIC VISIT: HCPCS

## 2022-11-08 NOTE — PROGRESS NOTES
LUNG NODULE & INTERVENTIONAL PULMONARY CLINIC  [facility]     Salas Clinton MRN# [id number]   Age: 52 year old YOB: 1970     Reason for Consultation: lung nodule(s)     Assessment and Plan:    1. New multiple pulmonary lung nodule(s). Given the characteristics on current/previous imaging and risk factors; I would classify this to be Intermediate (6-65%) risk for cancer. Has two nodules in LEONOR which one is calcified. There is a new 8mm LLL ggo. We will proceed with Fleischner and repeat CT in 6mo     2. Tobacco use. Trying to quit on his own     Billing: I spent 30-39min (Established, 42186) on the date of the encounter doing chart review, history and exam, documentation and further activities as described in this note.    Lloyd Goode MD  Associate Professor of Medicine  Section of Interventional Pulmonology   Division of Pulmonary, Allergy, Critical Care and Sleep Medicine   [facility]  Pager: [phone]   Office: [phone]  Email: [email or web address]    Aishwarya LOYA, RN  Interventional Pulmonary Care Coordinator  Office: [phone]  Email: [email or web address]    July Yuan  Interventional Pulmonology Surgery Scheduler  Office: [phone]  Email: [email or web address]         History:     Salas Clinton is a 51 year old male with sig h/o for GERD, Hepatitis, Seizure who is here for evaluation/followup of lung nodule(s).     - No new resp sx or complaints. Denies dyspnea or cough.   - had CT chest for chest pain and found incidental nodules   - Personal hx of cancer: No.   - Family hx of cancer: No lung cancer  - Exposure hx: Denies asbestos or radon exposure   - Tobacco hx: Current Smoker: 1ppd since 8yo. Has been down to 3 cig/d since 11/2021   - My interpretation of the images relevant for this visit includes: nodules   - My interpretation of the PFT's relevant for this visit includes: None       Culprit Nodule(s):   1. LEONOR 6 mm (series 3, images 39-41).  2. LEONOR 7 x 4 mm (series 3, image 20).   3. Stable small left major fissure pleural nodule (image 60).     Other active medical problems include:   - Has GERD. Stable    - Has hepatitis. Stable            Past Medical History:      Past Medical History:   Diagnosis Date     GERD (gastroesophageal reflux disease)      Hematochezia      Hepatitis     ETOH     Hyperlipidemia      Lower Back Pain     Created by Conversion      Seizure (H)            Past Surgical History:      Past Surgical History:   Procedure Laterality Date     FOOT SURGERY Right           Social History:     Social History     Tobacco Use     Smoking status: Every Day     Smokeless tobacco: Never     Tobacco comments:     Betelnut without Tobacco   Substance Use Topics     Alcohol use: No     Comment: Alcoholic Drinks/day: no longer drinking since 4/1/2017          Family History:     Family History   Problem Relation Age of Onset     Snoring Father            Allergies:      Allergies   Allergen Reactions     Folic Acid Unknown          Medications:     Current Outpatient Medications   Medication Sig     acetaminophen (TYLENOL) 325 MG tablet Take 2 tablets (650 mg) by mouth every 6 hours as needed     omeprazole (PRILOSEC) 20 MG DR capsule Take 1 capsule (20 mg) by mouth every morning     No current facility-administered medications for this visit.          Review of Systems:     CONSTITUTIONAL: negative for fever, chills, change in weight  INTEGUMENTARY/SKIN: no rash or obvious new lesions  ENT/MOUTH: no sore throat, new sinus pain or nasal drainage  RESP: see interval history  CV: negative for chest pain, palpitations or peripheral edema  GI: no nausea, vomiting, change in stools  : no dysuria  MUSCULOSKELETAL: no myalgias, arthralgias  ENDOCRINE: blood sugars with adequate control  PSYCHIATRIC: mood stable  LYMPHATIC: no new lymphadenopathy  HEME: no bleeding or easy  bruisability  NEURO: no numbness, weakness, headaches         Physical Exam:     Constitutional - looks well, in no apparent distress  Respiratory -breathing appears comfortable.   Skin - No appreciable discoloration or lesions (very limited exam)  Neurological - No apparent tremors. Speech fluent and articlate          Current Laboratory Data:   All laboratory and imaging data reviewed.    No results found for this or any previous visit (from the past 24 hour(s)).         Previous Chest Imaging   No images are attached to the encounter.  No images are attached to the encounter or orders placed in the encounter.         Previous Cardiology Imaging   No results found for this or any previous visit (from the past 8760 hour(s)).

## 2022-11-08 NOTE — LETTER
11/8/2022       RE: Salas Clinton  [address]  [place] [address]     Dear Colleague,    Thank you for referring your patient, Salas Clinton, to the [facility] at [facility]. Please see a copy of my visit note below.    LUNG NODULE & INTERVENTIONAL PULMONARY CLINIC  CLINICS & SURGERY [facility]     Salas Clinton MRN# [id number]   Age: 52 year old YOB: 1970     Reason for Consultation: lung nodule(s)     Assessment and Plan:    1. New multiple pulmonary lung nodule(s). Given the characteristics on current/previous imaging and risk factors; I would classify this to be Intermediate (6-65%) risk for cancer. Has two nodules in LEONOR which one is calcified. There is a new 8mm LLL ggo. We will proceed with Fleischner and repeat CT in 6mo     2. Tobacco use. Trying to quit on his own     Billing: I spent 30-39min (Established, 70816) on the date of the encounter doing chart review, history and exam, documentation and further activities as described in this note.    Lloyd Goode MD  Associate Professor of Medicine  Section of Interventional Pulmonology   Division of Pulmonary, Allergy, Critical Care and Sleep Medicine   [facility]  Pager: [phone]   Office: [phone]  Email: [email or web address]    Aishwarya LOYA RN  Interventional Pulmonary Care Coordinator  Office: [phone]  Email: [email or web address]    July Yuan  Interventional Pulmonology Surgery Scheduler  Office: [phone]  Email: [email or web address]         History:     Salas Clinton is a 51 year old male with sig h/o for GERD, Hepatitis, Seizure who is here for evaluation/followup of lung nodule(s).     - No new resp sx or complaints. Denies dyspnea or cough.   - had CT chest for chest pain and found incidental nodules   - Personal hx of cancer: No.   - Family hx of cancer: No lung cancer  -  Exposure hx: Denies asbestos or radon exposure   - Tobacco hx: Current Smoker: 1ppd since 8yo. Has been down to 3 cig/d since 11/2021   - My interpretation of the images relevant for this visit includes: nodules   - My interpretation of the PFT's relevant for this visit includes: None      Culprit Nodule(s):   1. LEONOR 6 mm (series 3, images 39-41).  2. LEONOR 7 x 4 mm (series 3, image 20).   3. Stable small left major fissure pleural nodule (image 60).     Other active medical problems include:   - Has GERD. Stable    - Has hepatitis. Stable            Past Medical History:      Past Medical History:   Diagnosis Date     GERD (gastroesophageal reflux disease)      Hematochezia      Hepatitis     ETOH     Hyperlipidemia      Lower Back Pain     Created by Conversion      Seizure (H)            Past Surgical History:      Past Surgical History:   Procedure Laterality Date     FOOT SURGERY Right           Social History:     Social History     Tobacco Use     Smoking status: Every Day     Smokeless tobacco: Never     Tobacco comments:     Betelnut without Tobacco   Substance Use Topics     Alcohol use: No     Comment: Alcoholic Drinks/day: no longer drinking since 4/1/2017          Family History:     Family History   Problem Relation Age of Onset     Snoring Father            Allergies:      Allergies   Allergen Reactions     Folic Acid Unknown          Medications:     Current Outpatient Medications   Medication Sig     acetaminophen (TYLENOL) 325 MG tablet Take 2 tablets (650 mg) by mouth every 6 hours as needed     omeprazole (PRILOSEC) 20 MG DR capsule Take 1 capsule (20 mg) by mouth every morning     No current facility-administered medications for this visit.          Review of Systems:     CONSTITUTIONAL: negative for fever, chills, change in weight  INTEGUMENTARY/SKIN: no rash or obvious new lesions  ENT/MOUTH: no sore throat, new sinus pain or nasal drainage  RESP: see interval history  CV: negative for chest  pain, palpitations or peripheral edema  GI: no nausea, vomiting, change in stools  : no dysuria  MUSCULOSKELETAL: no myalgias, arthralgias  ENDOCRINE: blood sugars with adequate control  PSYCHIATRIC: mood stable  LYMPHATIC: no new lymphadenopathy  HEME: no bleeding or easy bruisability  NEURO: no numbness, weakness, headaches         Physical Exam:     Constitutional - looks well, in no apparent distress  Respiratory -breathing appears comfortable.   Skin - No appreciable discoloration or lesions (very limited exam)  Neurological - No apparent tremors. Speech fluent and articlate          Current Laboratory Data:   All laboratory and imaging data reviewed.    No results found for this or any previous visit (from the past 24 hour(s)).         Previous Chest Imaging   No images are attached to the encounter.  No images are attached to the encounter or orders placed in the encounter.         Previous Cardiology Imaging   No results found for this or any previous visit (from the past 8760 hour(s)).                 Again, thank you for allowing me to participate in the care of your patient.      Sincerely,    Lloyd Goode MD

## 2022-11-08 NOTE — NURSING NOTE
"Oncology Rooming Note    November 8, 2022 8:33 AM   Salas Clinton is a 52 year old male who presents for:    Chief Complaint   Patient presents with     Oncology Clinic Visit     Nodule of left lung     Initial Vitals: /72 (BP Location: Right arm, Patient Position: Sitting, Cuff Size: Adult Regular)   Pulse 89   Temp 98.4  F (36.9  C) (Tympanic)   Resp 16   Ht 1.584 m (5' 2.36\")   Wt 72.4 kg (159 lb 11.2 oz)   SpO2 100%   BMI 28.87 kg/m   Estimated body mass index is 28.87 kg/m  as calculated from the following:    Height as of this encounter: 1.584 m (5' 2.36\").    Weight as of this encounter: 72.4 kg (159 lb 11.2 oz). Body surface area is 1.78 meters squared.  Data Unavailable Comment: Data Unavailable   No LMP for male patient.  Allergies reviewed: Yes  Medications reviewed: Yes    Medications: Medication refills not needed today.  Pharmacy name entered into [employer]: [id number] [address] AT [facility] [address]    Clinical concerns: Sheree  needed.       Emma Garay [facility]            "

## 2022-12-29 ENCOUNTER — TELEPHONE (OUTPATIENT)
Dept: GASTROENTEROLOGY | Facility: CLINIC | Age: 52
End: 2022-12-29

## 2022-12-29 NOTE — TELEPHONE ENCOUNTER
Screening Questions  BLUE  KIND OF PREP RED  LOCATION [review exclusion criteria] GREEN  SEDATION TYPE        Yes Are you active on mychart?      [place]  Ordering/Referring Provider?        [facility] What type of coverage do you have?      N Have you had a positive covid test in the last 14 days?     29.07 1. BMI  [BMI 40+ - review exclusion criteria]    Yes  2. Are you able to give consent for your medical care? [IF NO,RN REVIEW]        N  3. Are you taking any prescription pain medications on a routine schedule?      NA  3a. EXTENDED PREP What kind of prescription?     N-in remission 4. Do you have any chemical dependencies such as alcohol, street drugs, or methadone?    N 5. Do you have any history of post-traumatic stress syndrome, severe anxiety or history of psychosis?      **If yes 3- 5 , please schedule with MAC sedation.**          IF YES TO ANY 6 - 10 - HOSPITAL SETTING ONLY.     N 6.   Do you need assistance transferring?     N 7.   Have you had a heart or lung transplant?    N 8.   Are you currently on dialysis?   N 9.   Do you use daily home oxygen?   N 10. Do you take nitroglycerin?   10a. NA If yes, how often?     11. [FEMALES]  NA Are you currently pregnant?    11a. NA If yes, how many weeks? [ Greater than 12 weeks, OR NEEDED]    N 12. Do you have Pulmonary Hypertension? *NEED PAC APPT AT [facility]*     N 13. [review exclusion criteria]  Do you have any implantable devices in your body (pacemaker, defib, LVAD)?    N 14. In the past 6 months, have you had any heart related issues including cardiomyopathy or heart attack?     14a. N If yes, did it require cardiac stenting if so when?     N 15. Have you had a stroke or Transient ischemic attack (TIA - aka  mini stroke ) within 6 months?      Yes 16. Do you have mod to severe Obstructive Sleep Apnea?  [Hospital only - Ok at [place]]    N 17. Do you have SEVERE AND UNCONTROLLED asthma? *NEED PAC APPT AT [facility]*     N 18. Are you currently taking any blood  "thinners?     18a. If yes, inform patient to \"follow up w/ ordering provider for bridging instructions.\"    N 19. Do you take the medication Phentermine?    19a. If yes, \"Hold for 7 days before procedure.  Please consult your prescribing provider if you have questions about holding this medication.\"     N  20. Do you have chronic kidney disease?      N  21. Do you have a diagnosis of diabetes?     NA  22. On a regular basis do you go 3-5 days between bowel movements?      23. Preferred LOCAL Pharmacy for Pre Prescription    [ LIST ONLY ONE PHARMACY]     [id number] - [place], [place] - [address] AT [facility] [address]        - CLOSING REMINDERS -    Informed patient they will need an adult    Cannot take any type of public or medical transportation alone    Conscious Sedation- Needs  for 6 hours after the procedure       MAC/General-Needs  for 24 hours after procedure    Pre-Procedure Covid test to be completed [facility]]    Confirmed Nurse will call to complete assessment       - SCHEDULING DETAILS -  Yes Hospital Setting Required? If yes, what is the exclusion?: BUFFY Bolaños  Surgeon    1/5/23  Date of Procedure  Upper Endoscopy [EGD]  Type of Procedure Scheduled  [facility]     MAC Sedation Type     NO PAC / Pre-op Required                 "

## 2022-12-29 NOTE — TELEPHONE ENCOUNTER
Pre assessment questions completed for upcoming upper endoscopy (EGD) procedure scheduled on 1/5/2023 with wife, consent to communicate on file. Sheree  id [id number]     COVID policy reviewed.     Pre-op scheduled  N/A    Reviewed procedural arrival time 0815 and facility location [facility]; [address], [place] [address]    Designated  policy reviewed. Instructed to have someone stay 6 hours post procedure.     Anticoagulation/blood thinners? No    Electronic implanted devices? No    Diabetic? No    Procedure indication: gastritis     Reviewed procedure prep instructions.     Prep instructions sent via [employer].      Patient verbalized understanding and had no questions or concerns at this time.    Sarahi Luong RN  Endoscopy Procedure Pre Assessment RN

## 2023-01-05 ENCOUNTER — ANESTHESIA (OUTPATIENT)
Dept: GASTROENTEROLOGY | Facility: CLINIC | Age: 53
End: 2023-01-05
Payer: COMMERCIAL

## 2023-01-05 ENCOUNTER — ANESTHESIA EVENT (OUTPATIENT)
Dept: GASTROENTEROLOGY | Facility: CLINIC | Age: 53
End: 2023-01-05
Payer: COMMERCIAL

## 2023-01-05 ENCOUNTER — HOSPITAL ENCOUNTER (OUTPATIENT)
Facility: CLINIC | Age: 53
Discharge: HOME | End: 2023-01-05
Attending: INTERNAL MEDICINE | Admitting: INTERNAL MEDICINE
Payer: COMMERCIAL

## 2023-01-05 VITALS
DIASTOLIC BLOOD PRESSURE: 72 MMHG | HEIGHT: 62 IN | BODY MASS INDEX: 28.84 KG/M2 | RESPIRATION RATE: 16 BRPM | HEART RATE: 67 BPM | TEMPERATURE: 98.7 F | WEIGHT: 156.75 LBS | OXYGEN SATURATION: 100 % | SYSTOLIC BLOOD PRESSURE: 114 MMHG

## 2023-01-05 DIAGNOSIS — K21.9 GASTROESOPHAGEAL REFLUX DISEASE, UNSPECIFIED WHETHER ESOPHAGITIS PRESENT: Primary | ICD-10-CM

## 2023-01-05 DIAGNOSIS — K29.90 GASTRITIS AND GASTRODUODENITIS: Primary | ICD-10-CM

## 2023-01-05 DIAGNOSIS — K29.50 OTHER CHRONIC GASTRITIS WITHOUT HEMORRHAGE: ICD-10-CM

## 2023-01-05 DIAGNOSIS — K29.70 GASTRITIS AND GASTRODUODENITIS: Primary | ICD-10-CM

## 2023-01-05 LAB — UPPER GI ENDOSCOPY: NORMAL

## 2023-01-05 PROCEDURE — 370N000017 HC ANESTHESIA TECHNICAL FEE, PER MIN: Performed by: INTERNAL MEDICINE

## 2023-01-05 PROCEDURE — 250N000009 HC RX 250: Performed by: NURSE ANESTHETIST, CERTIFIED REGISTERED

## 2023-01-05 PROCEDURE — 250N000011 HC RX IP 250 OP 636: Performed by: NURSE ANESTHETIST, CERTIFIED REGISTERED

## 2023-01-05 PROCEDURE — 88342 IMHCHEM/IMCYTCHM 1ST ANTB: CPT | Mod: 26 | Performed by: STUDENT IN AN ORGANIZED HEALTH CARE EDUCATION/TRAINING PROGRAM

## 2023-01-05 PROCEDURE — 88305 TISSUE EXAM BY PATHOLOGIST: CPT | Mod: 26 | Performed by: STUDENT IN AN ORGANIZED HEALTH CARE EDUCATION/TRAINING PROGRAM

## 2023-01-05 PROCEDURE — 258N000003 HC RX IP 258 OP 636: Performed by: NURSE ANESTHETIST, CERTIFIED REGISTERED

## 2023-01-05 PROCEDURE — 88342 IMHCHEM/IMCYTCHM 1ST ANTB: CPT | Mod: TC | Performed by: INTERNAL MEDICINE

## 2023-01-05 PROCEDURE — 43239 EGD BIOPSY SINGLE/MULTIPLE: CPT | Performed by: INTERNAL MEDICINE

## 2023-01-05 RX ORDER — PROPOFOL 10 MG/ML
INJECTION, EMULSION INTRAVENOUS CONTINUOUS PRN
Status: DISCONTINUED | OUTPATIENT
Start: 2023-01-05 | End: 2023-01-05

## 2023-01-05 RX ORDER — ONDANSETRON 2 MG/ML
4 INJECTION INTRAMUSCULAR; INTRAVENOUS EVERY 6 HOURS PRN
Status: DISCONTINUED | OUTPATIENT
Start: 2023-01-05 | End: 2023-01-05 | Stop reason: HOSPADM

## 2023-01-05 RX ORDER — NALOXONE HYDROCHLORIDE 0.4 MG/ML
0.2 INJECTION, SOLUTION INTRAMUSCULAR; INTRAVENOUS; SUBCUTANEOUS
Status: DISCONTINUED | OUTPATIENT
Start: 2023-01-05 | End: 2023-01-05 | Stop reason: HOSPADM

## 2023-01-05 RX ORDER — NALOXONE HYDROCHLORIDE 0.4 MG/ML
0.4 INJECTION, SOLUTION INTRAMUSCULAR; INTRAVENOUS; SUBCUTANEOUS
Status: DISCONTINUED | OUTPATIENT
Start: 2023-01-05 | End: 2023-01-05 | Stop reason: HOSPADM

## 2023-01-05 RX ORDER — LIDOCAINE 40 MG/G
CREAM TOPICAL
Status: DISCONTINUED | OUTPATIENT
Start: 2023-01-05 | End: 2023-01-05 | Stop reason: HOSPADM

## 2023-01-05 RX ORDER — PROCHLORPERAZINE MALEATE 5 MG
10 TABLET ORAL EVERY 6 HOURS PRN
Status: DISCONTINUED | OUTPATIENT
Start: 2023-01-05 | End: 2023-01-05 | Stop reason: HOSPADM

## 2023-01-05 RX ORDER — LIDOCAINE HYDROCHLORIDE 20 MG/ML
INJECTION, SOLUTION INFILTRATION; PERINEURAL PRN
Status: DISCONTINUED | OUTPATIENT
Start: 2023-01-05 | End: 2023-01-05

## 2023-01-05 RX ORDER — SODIUM CHLORIDE, SODIUM LACTATE, POTASSIUM CHLORIDE, CALCIUM CHLORIDE 600; 310; 30; 20 MG/100ML; MG/100ML; MG/100ML; MG/100ML
INJECTION, SOLUTION INTRAVENOUS CONTINUOUS PRN
Status: DISCONTINUED | OUTPATIENT
Start: 2023-01-05 | End: 2023-01-05

## 2023-01-05 RX ORDER — FLUMAZENIL 0.1 MG/ML
0.2 INJECTION, SOLUTION INTRAVENOUS
Status: DISCONTINUED | OUTPATIENT
Start: 2023-01-05 | End: 2023-01-05 | Stop reason: HOSPADM

## 2023-01-05 RX ORDER — PROPOFOL 10 MG/ML
INJECTION, EMULSION INTRAVENOUS PRN
Status: DISCONTINUED | OUTPATIENT
Start: 2023-01-05 | End: 2023-01-05

## 2023-01-05 RX ORDER — ONDANSETRON 4 MG/1
4 TABLET, ORALLY DISINTEGRATING ORAL EVERY 6 HOURS PRN
Status: DISCONTINUED | OUTPATIENT
Start: 2023-01-05 | End: 2023-01-05 | Stop reason: HOSPADM

## 2023-01-05 RX ORDER — ONDANSETRON 2 MG/ML
4 INJECTION INTRAMUSCULAR; INTRAVENOUS
Status: DISCONTINUED | OUTPATIENT
Start: 2023-01-05 | End: 2023-01-05 | Stop reason: HOSPADM

## 2023-01-05 RX ADMIN — PROPOFOL 20 MG: 10 INJECTION, EMULSION INTRAVENOUS at 11:07

## 2023-01-05 RX ADMIN — PROPOFOL 150 MCG/KG/MIN: 10 INJECTION, EMULSION INTRAVENOUS at 10:59

## 2023-01-05 RX ADMIN — PROPOFOL 50 MG: 10 INJECTION, EMULSION INTRAVENOUS at 11:05

## 2023-01-05 RX ADMIN — LIDOCAINE HYDROCHLORIDE 40 MG: 20 INJECTION, SOLUTION INFILTRATION; PERINEURAL at 11:04

## 2023-01-05 RX ADMIN — TOPICAL ANESTHETIC 1 SPRAY: 200 SPRAY DENTAL; PERIODONTAL at 10:55

## 2023-01-05 RX ADMIN — SODIUM CHLORIDE, POTASSIUM CHLORIDE, SODIUM LACTATE AND CALCIUM CHLORIDE: 600; 310; 30; 20 INJECTION, SOLUTION INTRAVENOUS at 10:55

## 2023-01-05 ASSESSMENT — ACTIVITIES OF DAILY LIVING (ADL)
ADLS_ACUITY_SCORE: 35
ADLS_ACUITY_SCORE: 33

## 2023-01-05 ASSESSMENT — LIFESTYLE VARIABLES: TOBACCO_USE: 1

## 2023-01-05 NOTE — ANESTHESIA CARE TRANSFER NOTE
Patient: Salas Clinton    Procedure: Procedure(s):  ESOPHAGOGASTRODUODENOSCOPY, WITH BIOPSY       Diagnosis: Chronic gastritis without bleeding, unspecified gastritis type [K29.50]  Weight loss [R63.4]  Diagnosis Additional Information: No value filed.    Anesthesia Type:   MAC     Note:    Oropharynx: oropharynx clear of all foreign objects and spontaneously breathing  Level of Consciousness: drowsy  Oxygen Supplementation: room air    Independent Airway: airway patency satisfactory and stable  Dentition: dentition unchanged  Vital Signs Stable: post-procedure vital signs reviewed and stable  Report to RN Given: handoff report given  Patient transferred to: Phase II    Handoff Report: Identifed the Patient, Identified the Reponsible Provider, Reviewed the pertinent medical history, Discussed the surgical course, Reviewed Intra-OP anesthesia mangement and issues during anesthesia, Set expectations for post-procedure period and Allowed opportunity for questions and acknowledgement of understanding      Vitals:  Vitals Value Taken Time   BP     Temp     Pulse     Resp     SpO2 100 % 01/05/23 1119   Vitals shown include unvalidated device data.    Electronically Signed By: ERICA Yousif CRNA  January 5, 2023  11:20 AM

## 2023-01-05 NOTE — ANESTHESIA POSTPROCEDURE EVALUATION
Patient: Salas Clinton    Procedure: Procedure(s):  ESOPHAGOGASTRODUODENOSCOPY, WITH BIOPSY       Anesthesia Type:  MAC    Note:  Disposition: Outpatient   Postop Pain Control: Uneventful            Sign Out: Well controlled pain   PONV: No   Neuro/Psych: Uneventful            Sign Out: Acceptable/Baseline neuro status   Airway/Respiratory: Uneventful            Sign Out: Acceptable/Baseline resp. status   CV/Hemodynamics: Uneventful            Sign Out: Acceptable CV status; No obvious hypovolemia; No obvious fluid overload   Other NRE: NONE   DID A NON-ROUTINE EVENT OCCUR? No           Last vitals:  Vitals Value Taken Time   /72 01/05/23 1143   Temp     Pulse 65 01/05/23 1143   Resp 16 01/05/23 1143   SpO2 100 % 01/05/23 1145   Vitals shown include unvalidated device data.    Electronically Signed By: GERRY MCKEON MD  January 5, 2023  11:53 AM

## 2023-01-05 NOTE — ANESTHESIA PREPROCEDURE EVALUATION
Anesthesia Pre-Procedure Evaluation    Patient: Salas Clinton   MRN: [id number] : 1970        Procedure : Procedure(s):  ESOPHAGOGASTRODUODENOSCOPY (EGD)          Past Medical History:   Diagnosis Date     GERD (gastroesophageal reflux disease)      Hematochezia      Hepatitis     ETOH     Hyperlipidemia      Lower Back Pain     Created by Conversion      Seizure (H)       Past Surgical History:   Procedure Laterality Date     FOOT SURGERY Right       Allergies   Allergen Reactions     Folic Acid Unknown      Social History     Tobacco Use     Smoking status: Every Day     Packs/day: 0.50     Types: Cigarettes     Smokeless tobacco: Former     Types: Chew     Tobacco comments:     Betelnut without Tobacco   Substance Use Topics     Alcohol use: No     Comment: Alcoholic Drinks/day: no longer drinking since 2017      Wt Readings from Last 1 Encounters:   23 71.1 kg (156 lb 12 oz)        Anesthesia Evaluation   Pt has had prior anesthetic.     No history of anesthetic complications       ROS/MED HX  ENT/Pulmonary: Comment: Remote hx of treated TB    (+) sleep apnea, tobacco use, Current use,     Neurologic:    (-) no CVA   Cardiovascular:     (+) Dyslipidemia -----    METS/Exercise Tolerance:     Hematologic:       Musculoskeletal:       GI/Hepatic: Comment: Hx of gastritis    (+) GERD, hepatitis type Alcoholic, liver disease,     Renal/Genitourinary:       Endo:     (+) thyroid problem, hypothyroidism,     Psychiatric/Substance Use:     (+) alcohol abuse     Infectious Disease:       Malignancy:       Other:            Physical Exam    Airway        Mallampati: II   TM distance: > 3 FB   Neck ROM: full   Mouth opening: > 3 cm    Respiratory Devices and Support         Dental     Comment: Poor dentition, edentulous lower        Cardiovascular          Rhythm and rate: regular and normal     Pulmonary           breath sounds clear to auscultation           OUTSIDE LABS:  CBC:   Lab Results   Component  Value Date    WBC 8.5 06/10/2019    WBC 8.3 08/01/2018    HGB 10.2 (L) 06/10/2019    HGB 13.1 (L) 08/01/2018    HCT 35.3 (L) 06/10/2019    HCT 39.6 (L) 08/01/2018     06/10/2019     08/01/2018     BMP:   Lab Results   Component Value Date     11/01/2022     02/11/2021    POTASSIUM 3.9 11/01/2022    POTASSIUM 3.8 02/11/2021    CHLORIDE 103 11/01/2022    CHLORIDE 106 02/11/2021    CO2 23 11/01/2022    CO2 23 02/11/2021    BUN 5.0 (L) 11/01/2022    BUN 12 02/11/2021    CR 1.12 11/01/2022    CR 1.05 02/11/2021    GLC 87 11/01/2022     02/11/2021     COAGS: No results found for: PTT, INR, FIBR  POC: No results found for: BGM, HCG, HCGS  HEPATIC:   Lab Results   Component Value Date    ALBUMIN 4.1 11/01/2022    PROTTOTAL 7.1 11/01/2022    ALT 18 11/01/2022    AST 30 11/01/2022    ALKPHOS 51 11/01/2022    BILITOTAL 0.2 11/01/2022     OTHER:   Lab Results   Component Value Date    LACT 1.2 06/10/2019    A1C 5.3 11/01/2022    ADWIT 9.2 11/01/2022    PHOS 3.6 08/15/2018    LIPASE 12 06/10/2019    TSH 4.23 02/11/2021       Anesthesia Plan    ASA Status:  3   NPO Status:  NPO Appropriate    Anesthesia Type: MAC.      Maintenance: TIVA.        Consents    Anesthesia Plan(s) and associated risks, benefits, and realistic alternatives discussed. Questions answered and patient/representative(s) expressed understanding.    - Discussed:     - Discussed with:  Patient,       - Specific Concerns: aspiration.     - Extended Intubation/Ventilatory Support Discussed: No.      - Patient is DNR/DNI Status: No    Use of blood products discussed: No .     Postoperative Care       PONV prophylaxis: Background Propofol Infusion     Comments:                GERRY MCKEON MD

## 2023-01-10 DIAGNOSIS — D50.9 MICROCYTIC ANEMIA: Primary | ICD-10-CM

## 2023-01-10 NOTE — PROGRESS NOTES
CBC ordered.        Susana Bolaños MD Letts, James P, MD Hi James,   I saw this patient today in endoscopy for EGD.     He does have a gastric ulcer, checking for HPylori.     I noticed that his last CBC was about 2 1/2 years ago and was abnormal. Given this and his weight loss, and history of alcohol-related liver disease I would recommend repeat CBC.     I have instructed him to increase PPI to BID for 8 weeks and repeat EGD.     Susana Bolaños MD      Hepatology/Liver Transplant   Medical Director, Liver Transplantation   [facility]

## 2023-01-13 LAB
PATH REPORT.COMMENTS IMP SPEC: NORMAL
PATH REPORT.FINAL DX SPEC: NORMAL
PATH REPORT.GROSS SPEC: NORMAL
PATH REPORT.MICROSCOPIC SPEC OTHER STN: NORMAL
PATH REPORT.RELEVANT HX SPEC: NORMAL
PHOTO IMAGE: NORMAL

## 2023-04-20 PROBLEM — K55.20 ANGIODYSPLASIA OF COLON: Status: ACTIVE | Noted: 2017-05-16

## 2023-04-20 PROBLEM — K62.5 HEMORRHAGE OF RECTUM AND ANUS: Status: ACTIVE | Noted: 2017-04-17

## 2023-04-20 PROBLEM — K20.90 ESOPHAGITIS: Status: ACTIVE | Noted: 2017-05-16

## 2023-04-20 PROBLEM — K63.5 POLYP OF LARGE INTESTINE: Status: ACTIVE | Noted: 2017-06-28

## 2023-04-20 PROBLEM — K76.0 STEATOSIS OF LIVER: Status: ACTIVE | Noted: 2017-04-17

## 2023-04-20 PROBLEM — Z86.0100 HISTORY OF COLONIC POLYPS: Status: ACTIVE | Noted: 2022-08-10

## 2023-04-20 PROBLEM — K57.30 DIVERTICULAR DISEASE OF LARGE INTESTINE: Status: ACTIVE | Noted: 2022-08-08

## 2023-04-20 PROBLEM — K70.9 ALCOHOLIC LIVER DAMAGE (H): Status: ACTIVE | Noted: 2017-06-28

## 2023-04-20 PROBLEM — K63.5 POLYP OF COLON: Status: ACTIVE | Noted: 2017-05-16

## 2023-04-20 PROBLEM — K64.9 HEMORRHOIDS: Status: ACTIVE | Noted: 2017-05-16

## 2023-04-20 PROBLEM — D12.2 BENIGN NEOPLASM OF ASCENDING COLON: Status: ACTIVE | Noted: 2017-05-18

## 2023-04-20 PROBLEM — K76.0 NONALCOHOLIC FATTY LIVER DISEASE: Status: ACTIVE | Noted: 2017-04-17

## 2023-04-20 PROBLEM — R10.13 EPIGASTRIC PAIN: Status: ACTIVE | Noted: 2017-04-17

## 2023-04-20 RX ORDER — POLYVINYL ALCOHOL 14 MG/ML
SOLUTION/ DROPS OPHTHALMIC
COMMUNITY
Start: 2022-09-29 | End: 2023-05-02

## 2023-05-02 ENCOUNTER — TELEPHONE (OUTPATIENT)
Dept: GASTROENTEROLOGY | Facility: CLINIC | Age: 53
End: 2023-05-02

## 2023-05-02 ENCOUNTER — OFFICE VISIT (OUTPATIENT)
Dept: FAMILY MEDICINE | Facility: CLINIC | Age: 53
End: 2023-05-02
Payer: COMMERCIAL

## 2023-05-02 VITALS
TEMPERATURE: 98.8 F | WEIGHT: 165.12 LBS | HEIGHT: 62 IN | OXYGEN SATURATION: 99 % | RESPIRATION RATE: 12 BRPM | HEART RATE: 90 BPM | SYSTOLIC BLOOD PRESSURE: 114 MMHG | BODY MASS INDEX: 30.39 KG/M2 | DIASTOLIC BLOOD PRESSURE: 60 MMHG

## 2023-05-02 DIAGNOSIS — Z11.4 SCREENING FOR HIV (HUMAN IMMUNODEFICIENCY VIRUS): ICD-10-CM

## 2023-05-02 DIAGNOSIS — K70.9 ALCOHOLIC LIVER DAMAGE (H): ICD-10-CM

## 2023-05-02 DIAGNOSIS — F10.21 ALCOHOL DEPENDENCE IN REMISSION (H): ICD-10-CM

## 2023-05-02 DIAGNOSIS — K25.9 GASTRIC ULCER WITHOUT HEMORRHAGE OR PERFORATION, UNSPECIFIED CHRONICITY: Primary | ICD-10-CM

## 2023-05-02 DIAGNOSIS — E03.8 SUBCLINICAL HYPOTHYROIDISM: ICD-10-CM

## 2023-05-02 DIAGNOSIS — R63.4 WEIGHT LOSS: ICD-10-CM

## 2023-05-02 LAB
ERYTHROCYTE [DISTWIDTH] IN BLOOD BY AUTOMATED COUNT: 23.1 % (ref 10–15)
HCT VFR BLD AUTO: 30.3 % (ref 40–53)
HGB BLD-MCNC: 7.5 G/DL (ref 13.3–17.7)
HIV 1+2 AB+HIV1 P24 AG SERPL QL IA: NONREACTIVE
MCH RBC QN AUTO: 16.1 PG (ref 26.5–33)
MCHC RBC AUTO-ENTMCNC: 24.8 G/DL (ref 31.5–36.5)
MCV RBC AUTO: 65 FL (ref 78–100)
PLATELET # BLD AUTO: 265 10E3/UL (ref 150–450)
RBC # BLD AUTO: 4.66 10E6/UL (ref 4.4–5.9)
T4 FREE SERPL-MCNC: 1.22 NG/DL (ref 0.9–1.7)
TSH SERPL DL<=0.005 MIU/L-ACNC: 8.57 UIU/ML (ref 0.3–4.2)
WBC # BLD AUTO: 7.7 10E3/UL (ref 4–11)

## 2023-05-02 PROCEDURE — 85027 COMPLETE CBC AUTOMATED: CPT | Performed by: FAMILY MEDICINE

## 2023-05-02 PROCEDURE — 84443 ASSAY THYROID STIM HORMONE: CPT | Performed by: FAMILY MEDICINE

## 2023-05-02 PROCEDURE — 36415 COLL VENOUS BLD VENIPUNCTURE: CPT | Performed by: FAMILY MEDICINE

## 2023-05-02 PROCEDURE — 99214 OFFICE O/P EST MOD 30 MIN: CPT | Performed by: FAMILY MEDICINE

## 2023-05-02 PROCEDURE — 87389 HIV-1 AG W/HIV-1&-2 AB AG IA: CPT | Performed by: FAMILY MEDICINE

## 2023-05-02 PROCEDURE — 84439 ASSAY OF FREE THYROXINE: CPT | Performed by: FAMILY MEDICINE

## 2023-05-02 NOTE — PROGRESS NOTES
ASSESMENT AND PLAN:  Diagnoses and all orders for this visit:  Gastric ulcer without hemorrhage or perforation, unspecified chronicity  Reviewed the most recent recommendation from gastroenterology, he did complete the 8 weeks of twice daily omeprazole but did not schedule his follow-up EGD.  Patient's daughter who speaks English well was given the phone number to call and get this scheduled.  Patient in agreement with the plan.  We will reduce his omeprazole to once daily for this next year.  H. pylori testing came back negative.  -     omeprazole (PRILOSEC) 20 MG DR capsule; Take 1 capsule (20 mg) by mouth daily  -     CBC with platelets; Future  Weight loss  Improving, he has had 6 pounds of regain over this past duration of time since his last visit here at the clinic.  Likely secondary to his gastric ulcer which has now been treated as detailed above.  -     TSH with free T4 reflex; Future  Alcohol dependence in remission (H) with history of Alcoholic liver damage (H)  Reviewed most recent labs which showed normal liver enzymes.  Patient congratulated on his ongoing sobriety.  Counseling done with the patient with the help of a professional .  Screening for HIV (human immunodeficiency virus)  -     HIV Antigen Antibody Combo; Future      Addendum- TSH comes back elevated with a normal T4 consistent with subclinical hypothyroidism.  We will start levothyroxine 25 mcg once daily and plan to recheck TSH in 4 months.    Reviewed the risks and benefits of the treatment plan with the patient and/or caregiver and we discussed indications for routine and emergent follow-up.        SUBJECTIVE: Since last visit with me the patient did have his upper endoscopy which showed a gastric ulcer, H. pylori biopsies were negative.  He was treated with twice daily omeprazole and his symptoms resolved completely.  He recently ran out of the omeprazole.  He is not having any abdominal pain.  No blood in the stool or black  "tarry stools.  He failed to go in for the follow-up EGD that was recommended after 8 weeks of treatment.  Patient has remote history of alcohol dependence and alcoholic liver damage.  He has not used any alcohol since his last visit, which puts his sobriety at well over 1 year.    Past Medical History:   Diagnosis Date     GERD (gastroesophageal reflux disease)      Hematochezia      Hepatitis     ETOH     Hyperlipidemia      Lower Back Pain     Created by Conversion      Seizure (H)      Patient Active Problem List   Diagnosis     Nicotine Dependence     Hemorrhoids     Serum Enzyme Levels - AST (SGOT) Elevated     Accessory Navicular     Gastritis Due To H. Pylori     Bone Cyst     Hypercholesteremia     Gastritis     Insomnia     Alcoholic liver damage (H)     Polyp of large intestine     Elevated liver enzymes     Oral leukoplakia     Subclinical hypothyroidism     Alcohol abuse     Nodule of upper lobe of left lung     Restless legs syndrome     Gastroesophageal reflux disease, unspecified whether esophagitis present     Obstructive sleep apnea     Nephrolithiasis     Alcohol dependence in remission (H)     Angiodysplasia of colon     Benign neoplasm of ascending colon     Epigastric pain     Hemorrhage of rectum and anus     History of colonic polyps     Nonalcoholic fatty liver disease     Steatosis of liver     Diverticular disease of large intestine     Polyp of colon     Esophagitis     Current Outpatient Medications   Medication Sig Dispense Refill     omeprazole (PRILOSEC) 20 MG DR capsule Take 1 capsule (20 mg) by mouth daily 90 capsule 3     History   Smoking Status     Every Day     Packs/day: 0.50     Types: Cigarettes   Smokeless Tobacco     Former     Types: Chew       OBJECTICE: /60   Pulse 90   Temp 98.8  F (37.1  C) (Oral)   Resp 12   Ht 1.584 m (5' 2.36\")   Wt 74.9 kg (165 lb 1.9 oz)   SpO2 99%   BMI 29.85 kg/m       No results found for this or any previous visit (from the past 24 " hour(s)).      HEENT-No scleral jaundice   CV-regular rate and rhythm with no murmur    RESP-lungs clear to auscultation   ABDOMINAL-soft, nontender, no palpable masses organomegaly   EXTREM-no pitting edema of the ankles       Boaz Bell MD       Answers for HPI/ROS submitted by the patient on 5/2/2023  What is the reason for your visit today? : follow up  How many servings of fruits and vegetables do you eat daily?: 0-1  On average, how many sweetened beverages do you drink each day (Examples: soda, juice, sweet tea, etc.  Do NOT count diet or artificially sweetened beverages)?: 0  How many minutes a day do you exercise enough to make your heart beat faster?: 10 to 19  How many days a week do you exercise enough to make your heart beat faster?: 3 or less  How many days per week do you miss taking your medication?: 0

## 2023-05-02 NOTE — TELEPHONE ENCOUNTER
Screening Questions  BLUE  KIND OF PREP RED  LOCATION [review exclusion criteria] GREEN  SEDATION TYPE        SORT OF Are you active on mychart?       ESTELLA Ordering/Referring Provider?        UCARE What type of coverage do you have?      N Have you had a positive covid test in the last 14 days?     29.85 1. BMI  [BMI 40+ - review exclusion criteria]    Y  2. Are you able to give consent for your medical care? [IF NO,RN REVIEW]          N  3. Are you taking any prescription pain medications on a routine schedule   (ex narcotics: oxycodone, roxicodone, oxycontin,  and percocet)? [RN Review]          3a. EXTENDED PREP What kind of prescription?     N 4. Do you have any chemical dependencies such as alcohol, street drugs, or methadone?        **If yes 3- 5 , please schedule with MAC sedation.**          IF YES TO ANY 6 - 10 - HOSPITAL SETTING ONLY.     N 6.   Do you need assistance transferring?     N 7.   Have you had a heart or lung transplant?    N 8.   Are you currently on dialysis?   N 9.   Do you use daily home oxygen?   N 10. Do you take nitroglycerin?   10a.  If yes, how often?     11. [FEMALES]   Are you currently pregnant?    11a.  If yes, how many weeks? [ Greater than 12 weeks, OR NEEDED]    N 12. Do you have Pulmonary Hypertension? *NEED PAC APPT AT [facility] w/ MAC*     N 13. [review exclusion criteria]  Do you have any implantable devices in your body (pacemaker, defib, LVAD)?    N 14. In the past 6 months, have you had any heart related issues including cardiomyopathy or heart attack?     14a.  If yes, did it require cardiac stenting if so when?     N 15. Have you had a stroke or Transient ischemic attack (TIA - aka  mini stroke ) within 6 months?      N 16. Do you have mod to severe Obstructive Sleep Apnea?  [Hospital only]    N 17. Do you have SEVERE AND UNCONTROLLED asthma? *NEED PAC APPT AT [facility] w/MAC*     18. Are you currently taking any blood thinners?     18a. No. Continue to 19.   18b. Yes/no  "Blood Thinner: No [CONTINUE TO #19]    N 19. Do you take the medication Phentermine?    19a. If yes, \"Hold for 7 days before procedure.  Please consult your prescribing provider if you have questions about holding this medication.\"     N  20. Do you have chronic kidney disease?      N  21. Do you have a diagnosis of diabetes?     NA  22. On a regular basis do you go 3-5 days between bowel movements?      23. Preferred LOCAL Pharmacy for Pre Prescription    [ LIST ONLY ONE PHARMACY]     [id number] [address] [facility] [address]        - CLOSING REMINDERS -    Informed patient they will need an adult    Cannot take any type of public or medical transportation alone    Conscious Sedation- Needs  for 6 hours after the procedure       MAC/General-Needs  for 24 hours after procedure    Pre-Procedure Covid test to be completed [[facility] PCR Testing Required]    Confirmed Nurse will call to complete assessment       - SCHEDULING DETAILS -  NO Hospital Setting Required? If yes, what is the exclusion?:    ESTELLA  Surgeon    8/24  Date of Procedure  Upper Endoscopy [EGD]  Type of Procedure Scheduled  [facility]   MAC Sedation Type     N PAC / Pre-op Required     DAUGHTER REQUESTING INFORMATION ALSO BE EMAILED TO HER AT  [email or web address]                "

## 2023-05-02 NOTE — TELEPHONE ENCOUNTER
Received message request from scheduling:     Agustina Claros Endoscopy Pre Assessment Nurse Pool  Hello,     This patient's daughter has requested that the 8/24 EGD information for her dad also be sent to her email.     Could you please send that to: [email or web address] ?     Thank you so much,   Agustina      Need to receive permission from patient's daughter, Maikel, prior to sending unencrypted email.     No phone number on file for patient's daughter, Maikel.     Called patient's phone number at [phone] with a Danger , ID#: [id number] in attempts to connect with patient's daughter, Maikel.     Patient's wife, Beh, answered the phone stating she is not home right now and provided Maikel's phone number: [phone]    Attempted to contact Maikel, patient's daughter, at [phone] but there was no answer, left a message via  to call back to [phone], option 4.    Consent to communicate is on file for both patient's wife, Beh, and patient's daughter, Maikel.    When Maikel calls back, please discuss unencrypted e-mail communication and make sure she agrees to accept the risks and receive unencrypted communications for this incidence and then send EGD instructions via email.      Taylor De Leon RN  Endoscopy Procedure Pre-Assessment RN

## 2023-05-03 RX ORDER — LEVOTHYROXINE SODIUM 25 UG/1
25 TABLET ORAL DAILY
Qty: 90 TABLET | Refills: 3 | Status: SHIPPED | OUTPATIENT
Start: 2023-05-03 | End: 2024-03-06

## 2023-05-04 ENCOUNTER — TELEPHONE (OUTPATIENT)
Dept: FAMILY MEDICINE | Facility: CLINIC | Age: 53
End: 2023-05-04
Payer: COMMERCIAL

## 2023-05-04 NOTE — TELEPHONE ENCOUNTER
----- Message from Boaz Bell MD sent at 5/3/2023  3:35 PM CDT -----  Team - please call patient with results.  Thyroid testing comes back in the range of subclinical hypothyroidism which means that he should take a small dose of thyroid medication every day, I sent it to his pharmacy for 25 mcg daily of levothyroxine.  We will plan to recheck his thyroid blood test again in 4 months here in the clinic.  Also inform the patient that his hemoglobin is low at 7.5 which means it is extra important that he be taking his omeprazole every day and that he follow-up with gastroenterology for his follow-up on his stomach ulcer upper endoscopy.  Thanks.

## 2023-05-04 NOTE — TELEPHONE ENCOUNTER
Writer called patient with the help of a Sheree  regarding provider's message below. Provider message relayed to patient.    Patient verbalizes understanding, agrees with plan and has no further questions.    4 month follow-up 9/05/2023 @ 10:40 AM    Closing encounter.    SELINA QuickN, RN   [facility]

## 2023-05-09 ENCOUNTER — ANCILLARY PROCEDURE (OUTPATIENT)
Dept: CT IMAGING | Facility: CLINIC | Age: 53
End: 2023-05-09
Attending: INTERNAL MEDICINE
Payer: COMMERCIAL

## 2023-05-09 ENCOUNTER — OFFICE VISIT (OUTPATIENT)
Dept: PULMONOLOGY | Facility: CLINIC | Age: 53
End: 2023-05-09
Attending: INTERNAL MEDICINE
Payer: COMMERCIAL

## 2023-05-09 VITALS
HEART RATE: 88 BPM | SYSTOLIC BLOOD PRESSURE: 134 MMHG | TEMPERATURE: 98 F | WEIGHT: 164.1 LBS | OXYGEN SATURATION: 100 % | DIASTOLIC BLOOD PRESSURE: 84 MMHG | BODY MASS INDEX: 29.67 KG/M2

## 2023-05-09 DIAGNOSIS — R91.1 NODULE OF LEFT LUNG: ICD-10-CM

## 2023-05-09 DIAGNOSIS — R91.1 NODULE OF LEFT LUNG: Primary | ICD-10-CM

## 2023-05-09 PROCEDURE — 71250 CT THORAX DX C-: CPT | Mod: GC | Performed by: RADIOLOGY

## 2023-05-09 PROCEDURE — 99215 OFFICE O/P EST HI 40 MIN: CPT | Performed by: INTERNAL MEDICINE

## 2023-05-09 PROCEDURE — G0463 HOSPITAL OUTPT CLINIC VISIT: HCPCS | Performed by: INTERNAL MEDICINE

## 2023-05-09 ASSESSMENT — PAIN SCALES - GENERAL: PAINLEVEL: NO PAIN (1)

## 2023-05-09 NOTE — NURSING NOTE
"Oncology Rooming Note    May 9, 2023 9:27 AM   Salas Clinton is a 53 year old male who presents for:    Chief Complaint   Patient presents with     Oncology Clinic Visit     Nodule of left lung      Initial Vitals: /84   Pulse 88   Temp 98  F (36.7  C) (Oral)   Wt 74.4 kg (164 lb 1.6 oz)   SpO2 100%   BMI 29.67 kg/m   Estimated body mass index is 29.67 kg/m  as calculated from the following:    Height as of 5/2/23: 1.584 m (5' 2.36\").    Weight as of this encounter: 74.4 kg (164 lb 1.6 oz). Body surface area is 1.81 meters squared.  No Pain (1) Comment: Data Unavailable   No LMP for male patient.  Allergies reviewed: Yes  Medications reviewed: Yes    Medications: Medication refills not needed today.  Pharmacy name entered into [facility]: [id number] [address] AT [facility] [address]    Clinical concerns: No new clinical concerns other than reason for visit today.     Martine Fitzgerald, EMT    "

## 2023-05-09 NOTE — LETTER
5/9/2023       RE: Salas Clinton  [address]  [place] [address]     Dear Colleague,    Thank you for referring your patient, Salas Clinton, to the [facility] at [facility]. Please see a copy of my visit note below.    LUNG NODULE & INTERVENTIONAL PULMONARY CLINIC  [facility]     Salas Clinton MRN# [id number]   Age: 53 year old YOB: 1970     Reason for Consultation: Lung Nodule      Assessment and Plan:    1.Established lung nodule(s). No interval change in last 6mo. Repeat CT in 12mo     2. Tobacco use. Trying to quit on his own     Billing: I spent a total of 45min spent on date of encounter which includes prep time, visit with the patient and post visit work including documentation and nursing communication     Lloyd Goode MD, MHA  Associate Professor of Medicine  Section of Interventional Pulmonology   Division of Pulmonary, Allergy, Critical Care and Sleep Medicine   Formerly [facility]  Pager: [phone]   Office: [phone]  Email: [email or web address]    Daniella Pritchard RN   Interventional Pulmonary Care Coordinator   Office: [phone]  Email: [email or web address]     Ruddy Larson  Interventional Pulmonary Surgery Scheduler   Office: [phone]  Email: [email or web address]         History:     Salas Clinton is a 51 year old male with sig h/o for GERD, Hepatitis, Seizure who is here for evaluation/followup of lung nodule(s).     - No new resp sx or complaints. Denies dyspnea or cough.   - here for follow-up nodules  - Personal hx of cancer: No.   - Family hx of cancer: No lung cancer  - Exposure hx: Denies asbestos or radon exposure   - Tobacco hx: Current Smoker: 1ppd since 10yo. Has been down to 3 cig/d since 11/2021   - My interpretation of the images relevant for this visit includes: nodules   - My  interpretation of the PFT's relevant for this visit includes: None      Culprit Nodule(s):   1. LEONOR 6 mm (series 3, images 39-41).  2. LEONOR 7 x 4 mm (series 3, image 20).   3. Stable small left major fissure pleural nodule (image 60).     Other active medical problems include:   - Has GERD. Stable    - Has hepatitis. Stable            Past Medical History:      Past Medical History:   Diagnosis Date    GERD (gastroesophageal reflux disease)     Hematochezia     Hepatitis     ETOH    Hyperlipidemia     Lower Back Pain     Created by Conversion     Seizure (H)            Past Surgical History:      Past Surgical History:   Procedure Laterality Date    ESOPHAGOSCOPY, GASTROSCOPY, DUODENOSCOPY (EGD), COMBINED N/A 1/5/2023    Procedure: ESOPHAGOGASTRODUODENOSCOPY, WITH BIOPSY;  Surgeon: Susana Bolaños MD;  Location:  GI    FOOT SURGERY Right           Social History:     Social History     Tobacco Use    Smoking status: Every Day     Packs/day: 0.50     Types: Cigarettes     Passive exposure: Past    Smokeless tobacco: Former     Types: Chew    Tobacco comments:     Betelnut without Tobacco   Vaping Use    Vaping status: Never Used   Substance Use Topics    Alcohol use: No     Comment: Alcoholic Drinks/day: no longer drinking since 4/1/2017          Family History:     Family History   Problem Relation Age of Onset    Snoring Father            Allergies:      Allergies   Allergen Reactions    Folic Acid Unknown          Medications:     Current Outpatient Medications   Medication Sig    levothyroxine (SYNTHROID/LEVOTHROID) 25 MCG tablet Take 1 tablet (25 mcg) by mouth daily    omeprazole (PRILOSEC) 20 MG DR capsule Take 1 capsule (20 mg) by mouth daily     No current facility-administered medications for this visit.          Review of Systems:     CONSTITUTIONAL: negative for fever, chills, change in weight  INTEGUMENTARY/SKIN: no rash or obvious new lesions  ENT/MOUTH: no sore throat, new sinus pain or  nasal drainage  RESP: see interval history  CV: negative for chest pain, palpitations or peripheral edema  GI: no nausea, vomiting, change in stools  : no dysuria  MUSCULOSKELETAL: no myalgias, arthralgias  ENDOCRINE: blood sugars with adequate control  PSYCHIATRIC: mood stable  LYMPHATIC: no new lymphadenopathy  HEME: no bleeding or easy bruisability  NEURO: no numbness, weakness, headaches         Physical Exam:     Constitutional - looks well, in no apparent distress  Respiratory -breathing appears comfortable.   Skin - No appreciable discoloration or lesions (very limited exam)  Neurological - No apparent tremors. Speech fluent and articlate          Current Laboratory Data:   All laboratory and imaging data reviewed.    No results found for this or any previous visit (from the past 24 hour(s)).       View : No data to display.                                  Again, thank you for allowing me to participate in the care of your patient.      Sincerely,    Lloyd Goode MD

## 2023-05-09 NOTE — PROGRESS NOTES
LUNG NODULE & INTERVENTIONAL PULMONARY CLINIC  [facility]     Salas Clinton MRN# [id number]   Age: 53 year old YOB: 1970     Reason for Consultation: Lung Nodule      Assessment and Plan:    1.Established lung nodule(s). No interval change in last 6mo. Repeat CT in 12mo     2. Tobacco use. Trying to quit on his own     Billing: I spent a total of 45min spent on date of encounter which includes prep time, visit with the patient and post visit work including documentation and nursing communication     Lloyd Goode MD, MHA  Associate Professor of Medicine  Section of Interventional Pulmonology   Division of Pulmonary, Allergy, Critical Care and Sleep Medicine   Former[facility]  Pager: [phone]   Office: [phone]  Email: [email or web address]    Daniella Pritchard RN   Interventional Pulmonary Care Coordinator   Office: [phone]  Email: [email or web address]     Ruddy Larson  Interventional Pulmonary Surgery Scheduler   Office: [phone]  Email: [email or web address]         History:     Salas Clinton is a 51 year old male with sig h/o for GERD, Hepatitis, Seizure who is here for evaluation/followup of lung nodule(s).     - No new resp sx or complaints. Denies dyspnea or cough.   - here for follow-up nodules  - Personal hx of cancer: No.   - Family hx of cancer: No lung cancer  - Exposure hx: Denies asbestos or radon exposure   - Tobacco hx: Current Smoker: 1ppd since 8yo. Has been down to 3 cig/d since 11/2021   - My interpretation of the images relevant for this visit includes: nodules   - My interpretation of the PFT's relevant for this visit includes: None      Culprit Nodule(s):   1. LEONOR 6 mm (series 3, images 39-41).  2. LEONOR 7 x 4 mm (series 3, image 20).   3. Stable small left major fissure pleural nodule (image 60).     Other active medical problems include:   - Has GERD.  Stable    - Has hepatitis. Stable            Past Medical History:      Past Medical History:   Diagnosis Date     GERD (gastroesophageal reflux disease)      Hematochezia      Hepatitis     ETOH     Hyperlipidemia      Lower Back Pain     Created by Conversion      Seizure (H)            Past Surgical History:      Past Surgical History:   Procedure Laterality Date     ESOPHAGOSCOPY, GASTROSCOPY, DUODENOSCOPY (EGD), COMBINED N/A 1/5/2023    Procedure: ESOPHAGOGASTRODUODENOSCOPY, WITH BIOPSY;  Surgeon: Susana Bolaños MD;  Location:  GI     FOOT SURGERY Right           Social History:     Social History     Tobacco Use     Smoking status: Every Day     Packs/day: 0.50     Types: Cigarettes     Passive exposure: Past     Smokeless tobacco: Former     Types: Chew     Tobacco comments:     Betelnut without Tobacco   Vaping Use     Vaping status: Never Used   Substance Use Topics     Alcohol use: No     Comment: Alcoholic Drinks/day: no longer drinking since 4/1/2017          Family History:     Family History   Problem Relation Age of Onset     Snoring Father            Allergies:      Allergies   Allergen Reactions     Folic Acid Unknown          Medications:     Current Outpatient Medications   Medication Sig     levothyroxine (SYNTHROID/LEVOTHROID) 25 MCG tablet Take 1 tablet (25 mcg) by mouth daily     omeprazole (PRILOSEC) 20 MG DR capsule Take 1 capsule (20 mg) by mouth daily     No current facility-administered medications for this visit.          Review of Systems:     CONSTITUTIONAL: negative for fever, chills, change in weight  INTEGUMENTARY/SKIN: no rash or obvious new lesions  ENT/MOUTH: no sore throat, new sinus pain or nasal drainage  RESP: see interval history  CV: negative for chest pain, palpitations or peripheral edema  GI: no nausea, vomiting, change in stools  : no dysuria  MUSCULOSKELETAL: no myalgias, arthralgias  ENDOCRINE: blood sugars with adequate control  PSYCHIATRIC: mood  stable  LYMPHATIC: no new lymphadenopathy  HEME: no bleeding or easy bruisability  NEURO: no numbness, weakness, headaches         Physical Exam:     Constitutional - looks well, in no apparent distress  Respiratory -breathing appears comfortable.   Skin - No appreciable discoloration or lesions (very limited exam)  Neurological - No apparent tremors. Speech fluent and articlate          Current Laboratory Data:   All laboratory and imaging data reviewed.    No results found for this or any previous visit (from the past 24 hour(s)).       View : No data to display.

## 2023-05-11 ENCOUNTER — MYC MEDICAL ADVICE (OUTPATIENT)
Dept: PULMONOLOGY | Facility: CLINIC | Age: 53
End: 2023-05-11
Payer: COMMERCIAL

## 2023-08-08 NOTE — TELEPHONE ENCOUNTER
Pre assessment completed for upcoming procedure.   (Please see previous telephone encounter notes for complete details)       Procedure details:    Arrival time and facility location reviewed    Pre op exam needed? N/A    Designated  policy reviewed. Instructed to have someone stay 24 hours post procedure.     COVID policy reviewed.      Medication review:    No medications are being held      Prep for procedure:     Reviewed procedure prep instructions.       Additional information needed?  N/A      Patient  verbalized understanding and had no questions or concerns at this time.      Penny Bolaños RN  Endoscopy Procedure Pre Assessment RN  [phone] option 4Pre visit planning completed.      Procedure details:    Patient scheduled for Upper endoscopy (EGD) on 8/24/2023.     Arrival time: 0630. Procedure time 0800    Pre op exam needed? N/A    Facility location: [facility]; [address] [place] [address], 3rd Floor, [place] [phone]    Sedation type: MAC    Indication for procedure: follow up gastric ulcer       Chart review:     Electronic implanted devices? No    Diabetic? No    Diabetic medication HOLDING recommendations: (if applicable)  Oral diabetic medications: N/A  Diabetic injectables: N/A  Insulin: N/A      Medication review:    Anticoagulants? No    NSAIDS? No NSAID medications per patient's medication list.  RN will verify with pre-assessment call.    Other medication HOLDING recommendations:  N/A      Prep for procedure:     Bowel prep recommendation: N/A     Prep instructions sent via AIS         Penny Bolaños RN  Endoscopy Procedure Pre Assessment RN  [phone] option 4

## 2023-08-24 ENCOUNTER — ANESTHESIA (OUTPATIENT)
Dept: GASTROENTEROLOGY | Facility: CLINIC | Age: 53
End: 2023-08-24
Payer: COMMERCIAL

## 2023-08-24 ENCOUNTER — ANESTHESIA EVENT (OUTPATIENT)
Dept: GASTROENTEROLOGY | Facility: CLINIC | Age: 53
End: 2023-08-24
Payer: COMMERCIAL

## 2023-08-24 ENCOUNTER — HOSPITAL ENCOUNTER (OUTPATIENT)
Facility: CLINIC | Age: 53
Discharge: HOME | End: 2023-08-24
Attending: INTERNAL MEDICINE | Admitting: INTERNAL MEDICINE
Payer: COMMERCIAL

## 2023-08-24 VITALS
SYSTOLIC BLOOD PRESSURE: 102 MMHG | DIASTOLIC BLOOD PRESSURE: 67 MMHG | TEMPERATURE: 98.4 F | HEART RATE: 80 BPM | OXYGEN SATURATION: 95 % | RESPIRATION RATE: 16 BRPM

## 2023-08-24 DIAGNOSIS — K20.90 ESOPHAGITIS: ICD-10-CM

## 2023-08-24 DIAGNOSIS — K76.0 NONALCOHOLIC FATTY LIVER DISEASE: Primary | ICD-10-CM

## 2023-08-24 LAB — UPPER GI ENDOSCOPY: NORMAL

## 2023-08-24 PROCEDURE — 250N000011 HC RX IP 250 OP 636: Mod: JZ | Performed by: INTERNAL MEDICINE

## 2023-08-24 PROCEDURE — 88305 TISSUE EXAM BY PATHOLOGIST: CPT | Mod: TC | Performed by: INTERNAL MEDICINE

## 2023-08-24 PROCEDURE — 88341 IMHCHEM/IMCYTCHM EA ADD ANTB: CPT | Mod: 26 | Performed by: PATHOLOGY

## 2023-08-24 PROCEDURE — 250N000011 HC RX IP 250 OP 636: Performed by: NURSE ANESTHETIST, CERTIFIED REGISTERED

## 2023-08-24 PROCEDURE — 250N000009 HC RX 250: Performed by: NURSE ANESTHETIST, CERTIFIED REGISTERED

## 2023-08-24 PROCEDURE — 88342 IMHCHEM/IMCYTCHM 1ST ANTB: CPT | Mod: 26 | Performed by: PATHOLOGY

## 2023-08-24 PROCEDURE — 258N000003 HC RX IP 258 OP 636: Performed by: NURSE ANESTHETIST, CERTIFIED REGISTERED

## 2023-08-24 PROCEDURE — 43239 EGD BIOPSY SINGLE/MULTIPLE: CPT | Performed by: INTERNAL MEDICINE

## 2023-08-24 PROCEDURE — 88305 TISSUE EXAM BY PATHOLOGIST: CPT | Mod: 26 | Performed by: PATHOLOGY

## 2023-08-24 PROCEDURE — 370N000017 HC ANESTHESIA TECHNICAL FEE, PER MIN: Performed by: INTERNAL MEDICINE

## 2023-08-24 RX ORDER — PROCHLORPERAZINE MALEATE 10 MG
10 TABLET ORAL EVERY 6 HOURS PRN
Status: DISCONTINUED | OUTPATIENT
Start: 2023-08-24 | End: 2023-08-24 | Stop reason: HOSPADM

## 2023-08-24 RX ORDER — ONDANSETRON 2 MG/ML
4 INJECTION INTRAMUSCULAR; INTRAVENOUS EVERY 6 HOURS PRN
Status: DISCONTINUED | OUTPATIENT
Start: 2023-08-24 | End: 2023-08-24 | Stop reason: HOSPADM

## 2023-08-24 RX ORDER — FENTANYL CITRATE 50 UG/ML
INJECTION, SOLUTION INTRAMUSCULAR; INTRAVENOUS PRN
Status: DISCONTINUED | OUTPATIENT
Start: 2023-08-24 | End: 2023-08-24

## 2023-08-24 RX ORDER — NALOXONE HYDROCHLORIDE 0.4 MG/ML
0.2 INJECTION, SOLUTION INTRAMUSCULAR; INTRAVENOUS; SUBCUTANEOUS
Status: DISCONTINUED | OUTPATIENT
Start: 2023-08-24 | End: 2023-08-24 | Stop reason: HOSPADM

## 2023-08-24 RX ORDER — FLUMAZENIL 0.1 MG/ML
0.2 INJECTION, SOLUTION INTRAVENOUS
Status: DISCONTINUED | OUTPATIENT
Start: 2023-08-24 | End: 2023-08-24 | Stop reason: HOSPADM

## 2023-08-24 RX ORDER — OXYCODONE HYDROCHLORIDE 5 MG/1
5 TABLET ORAL
Status: DISCONTINUED | OUTPATIENT
Start: 2023-08-24 | End: 2023-08-24 | Stop reason: HOSPADM

## 2023-08-24 RX ORDER — ONDANSETRON 4 MG/1
4 TABLET, ORALLY DISINTEGRATING ORAL EVERY 30 MIN PRN
Status: DISCONTINUED | OUTPATIENT
Start: 2023-08-24 | End: 2023-08-24 | Stop reason: HOSPADM

## 2023-08-24 RX ORDER — ONDANSETRON 2 MG/ML
4 INJECTION INTRAMUSCULAR; INTRAVENOUS EVERY 30 MIN PRN
Status: DISCONTINUED | OUTPATIENT
Start: 2023-08-24 | End: 2023-08-24 | Stop reason: HOSPADM

## 2023-08-24 RX ORDER — NALOXONE HYDROCHLORIDE 0.4 MG/ML
0.4 INJECTION, SOLUTION INTRAMUSCULAR; INTRAVENOUS; SUBCUTANEOUS
Status: DISCONTINUED | OUTPATIENT
Start: 2023-08-24 | End: 2023-08-24 | Stop reason: HOSPADM

## 2023-08-24 RX ORDER — ONDANSETRON 4 MG/1
4 TABLET, ORALLY DISINTEGRATING ORAL EVERY 6 HOURS PRN
Status: DISCONTINUED | OUTPATIENT
Start: 2023-08-24 | End: 2023-08-24 | Stop reason: HOSPADM

## 2023-08-24 RX ORDER — LIDOCAINE 40 MG/G
CREAM TOPICAL
Status: DISCONTINUED | OUTPATIENT
Start: 2023-08-24 | End: 2023-08-24 | Stop reason: HOSPADM

## 2023-08-24 RX ORDER — ONDANSETRON 2 MG/ML
4 INJECTION INTRAMUSCULAR; INTRAVENOUS
Status: COMPLETED | OUTPATIENT
Start: 2023-08-24 | End: 2023-08-24

## 2023-08-24 RX ORDER — SODIUM CHLORIDE, SODIUM LACTATE, POTASSIUM CHLORIDE, CALCIUM CHLORIDE 600; 310; 30; 20 MG/100ML; MG/100ML; MG/100ML; MG/100ML
INJECTION, SOLUTION INTRAVENOUS CONTINUOUS PRN
Status: DISCONTINUED | OUTPATIENT
Start: 2023-08-24 | End: 2023-08-24

## 2023-08-24 RX ORDER — PROPOFOL 10 MG/ML
INJECTION, EMULSION INTRAVENOUS CONTINUOUS PRN
Status: DISCONTINUED | OUTPATIENT
Start: 2023-08-24 | End: 2023-08-24

## 2023-08-24 RX ADMIN — MIDAZOLAM 2 MG: 1 INJECTION INTRAMUSCULAR; INTRAVENOUS at 07:47

## 2023-08-24 RX ADMIN — ONDANSETRON 4 MG: 2 INJECTION INTRAMUSCULAR; INTRAVENOUS at 07:57

## 2023-08-24 RX ADMIN — FENTANYL CITRATE 50 MCG: 50 INJECTION, SOLUTION INTRAMUSCULAR; INTRAVENOUS at 08:05

## 2023-08-24 RX ADMIN — FENTANYL CITRATE 50 MCG: 50 INJECTION, SOLUTION INTRAMUSCULAR; INTRAVENOUS at 07:47

## 2023-08-24 RX ADMIN — TOPICAL ANESTHETIC 2 EACH: 200 SPRAY DENTAL; PERIODONTAL at 07:47

## 2023-08-24 RX ADMIN — SODIUM CHLORIDE, POTASSIUM CHLORIDE, SODIUM LACTATE AND CALCIUM CHLORIDE: 600; 310; 30; 20 INJECTION, SOLUTION INTRAVENOUS at 07:47

## 2023-08-24 RX ADMIN — PROPOFOL 150 MCG/KG/MIN: 10 INJECTION, EMULSION INTRAVENOUS at 07:55

## 2023-08-24 ASSESSMENT — LIFESTYLE VARIABLES: TOBACCO_USE: 1

## 2023-08-24 ASSESSMENT — ACTIVITIES OF DAILY LIVING (ADL)
ADLS_ACUITY_SCORE: 35
ADLS_ACUITY_SCORE: 35

## 2023-08-24 NOTE — ANESTHESIA PREPROCEDURE EVALUATION
Anesthesia Pre-Procedure Evaluation    Patient: Salas Clinton   MRN: [id number] : 1970        Procedure : Procedure(s):  Esophagoscopy, gastroscopy, duodenoscopy (EGD), combined          Past Medical History:   Diagnosis Date    GERD (gastroesophageal reflux disease)     Hematochezia     Hepatitis     ETOH    Hyperlipidemia     Lower Back Pain     Created by Conversion     Seizure (H)       Past Surgical History:   Procedure Laterality Date    ESOPHAGOSCOPY, GASTROSCOPY, DUODENOSCOPY (EGD), COMBINED N/A 2023    Procedure: ESOPHAGOGASTRODUODENOSCOPY, WITH BIOPSY;  Surgeon: Susana Bolaños MD;  Location:  GI    FOOT SURGERY Right       Allergies   Allergen Reactions    Folic Acid Unknown      Social History     Tobacco Use    Smoking status: Every Day     Packs/day: 0.50     Types: Cigarettes     Passive exposure: Past    Smokeless tobacco: Former     Types: Chew    Tobacco comments:     Betelnut without Tobacco   Substance Use Topics    Alcohol use: No     Comment: Alcoholic Drinks/day: no longer drinking since 2017      Wt Readings from Last 1 Encounters:   23 74.4 kg (164 lb 1.6 oz)        Anesthesia Evaluation   Pt has had prior anesthetic.     No history of anesthetic complications       ROS/MED HX  ENT/Pulmonary: Comment: Remote hx of treated TB    (+) sleep apnea,               tobacco use, Current use,                      Neurologic:    (-) no CVA   Cardiovascular:     (+) Dyslipidemia - -   -  - -                                      METS/Exercise Tolerance:     Hematologic:       Musculoskeletal:       GI/Hepatic: Comment: Hx of gastritis    (+) GERD,          hepatitis type Alcoholic, liver disease,       Renal/Genitourinary:     (+) renal disease,             Endo:     (+)          thyroid problem, hypothyroidism,           Psychiatric/Substance Use:     (+)   alcohol abuse      Infectious Disease:       Malignancy:       Other:            Physical Exam    Airway         Mallampati: II   TM distance: > 3 FB   Neck ROM: full   Mouth opening: > 3 cm    Respiratory Devices and Support         Dental     Comment: Poor dentition, edentulous lower    (+) Multiple visibly decayed, broken teeth      Cardiovascular          Rhythm and rate: regular and normal     Pulmonary           breath sounds clear to auscultation           OUTSIDE LABS:  CBC:   Lab Results   Component Value Date    WBC 7.7 05/02/2023    WBC 8.5 06/10/2019    HGB 7.5 (L) 05/02/2023    HGB 10.2 (L) 06/10/2019    HCT 30.3 (L) 05/02/2023    HCT 35.3 (L) 06/10/2019     05/02/2023     06/10/2019     BMP:   Lab Results   Component Value Date     11/01/2022     02/11/2021    POTASSIUM 3.9 11/01/2022    POTASSIUM 3.8 02/11/2021    CHLORIDE 103 11/01/2022    CHLORIDE 106 02/11/2021    CO2 23 11/01/2022    CO2 23 02/11/2021    BUN 5.0 (L) 11/01/2022    BUN 12 02/11/2021    CR 1.12 11/01/2022    CR 1.05 02/11/2021    GLC 87 11/01/2022     02/11/2021     COAGS: No results found for: PTT, INR, FIBR  POC: No results found for: BGM, HCG, HCGS  HEPATIC:   Lab Results   Component Value Date    ALBUMIN 4.1 11/01/2022    PROTTOTAL 7.1 11/01/2022    ALT 18 11/01/2022    AST 30 11/01/2022    ALKPHOS 51 11/01/2022    BILITOTAL 0.2 11/01/2022     OTHER:   Lab Results   Component Value Date    LACT 1.2 06/10/2019    A1C 5.3 11/01/2022    DAWIT 9.2 11/01/2022    PHOS 3.6 08/15/2018    LIPASE 12 06/10/2019    TSH 8.57 (H) 05/02/2023    T4 1.22 05/02/2023       Anesthesia Plan    ASA Status:  3    NPO Status:  NPO Appropriate    Anesthesia Type: MAC.   Induction: Propofol, Intravenous.   Maintenance: TIVA.        Consents    Anesthesia Plan(s) and associated risks, benefits, and realistic alternatives discussed. Questions answered and patient/representative(s) expressed understanding.     - Discussed: Risks, Benefits and Alternatives for the PROCEDURE were discussed     - Discussed with:  Patient,        - Specific Concerns: aspiration.     - Extended Intubation/Ventilatory Support Discussed: No.      - Patient is DNR/DNI Status: No     Use of blood products discussed: No .     Postoperative Care       PONV prophylaxis: Background Propofol Infusion     Comments:                Concha Cain MD

## 2023-08-24 NOTE — ANESTHESIA POSTPROCEDURE EVALUATION
Patient: Salas Clinton    Procedure: Procedure(s):  ESOPHAGOGASTRODUODENOSCOPY, WITH BIOPSY       Anesthesia Type:  MAC    Note:  Disposition: Outpatient   Postop Pain Control: Uneventful            Sign Out: Well controlled pain   PONV: No   Neuro/Psych: Uneventful            Sign Out: Acceptable/Baseline neuro status   Airway/Respiratory: Uneventful            Sign Out: Acceptable/Baseline resp. status   CV/Hemodynamics: Uneventful            Sign Out: Acceptable CV status; No obvious hypovolemia; No obvious fluid overload   Other NRE: NONE   DID A NON-ROUTINE EVENT OCCUR? No           Last vitals:  Vitals Value Taken Time   /67 08/24/23 0830   Temp     Pulse 80 08/24/23 0830   Resp 16 08/24/23 0830   SpO2 95 % 08/24/23 0834   Vitals shown include unvalidated device data.    Electronically Signed By: Concha Cain MD  August 24, 2023  8:35 AM

## 2023-08-24 NOTE — ANESTHESIA CARE TRANSFER NOTE
Patient: Salas Clinton    Procedure: Procedure(s):  ESOPHAGOGASTRODUODENOSCOPY, WITH BIOPSY       Diagnosis: Gastritis and gastroduodenitis [K29.70, K29.90]  Diagnosis Additional Information: No value filed.    Anesthesia Type:   MAC     Note:    Oropharynx: oropharynx clear of all foreign objects  Level of Consciousness: awake  Oxygen Supplementation: nasal cannula  Level of Supplemental Oxygen (L/min / FiO2): 2      Vital Signs Stable: post-procedure vital signs reviewed and stable    Patient transferred to: Phase II  Comments: Pt remains stable, monitors on alarms in place, report to PACU RN, no complications  Handoff Report: Identifed the Patient, Identified the Reponsible Provider, Reviewed the pertinent medical history, Discussed the surgical course, Reviewed Intra-OP anesthesia mangement and issues during anesthesia, Set expectations for post-procedure period and Allowed opportunity for questions and acknowledgement of understanding      Vitals:  Vitals Value Taken Time   BP     Temp     Pulse     Resp     SpO2         Electronically Signed By: ERICA Weinberg CRNA  August 24, 2023  8:15 AM

## 2023-08-31 PROBLEM — K31.A0 GASTRIC INTESTINAL METAPLASIA: Status: ACTIVE | Noted: 2023-08-31

## 2023-09-05 ENCOUNTER — OFFICE VISIT (OUTPATIENT)
Dept: FAMILY MEDICINE | Facility: CLINIC | Age: 53
End: 2023-09-05
Payer: COMMERCIAL

## 2023-09-05 VITALS
SYSTOLIC BLOOD PRESSURE: 107 MMHG | RESPIRATION RATE: 18 BRPM | HEART RATE: 89 BPM | DIASTOLIC BLOOD PRESSURE: 69 MMHG | BODY MASS INDEX: 28.71 KG/M2 | HEIGHT: 62 IN | OXYGEN SATURATION: 100 % | WEIGHT: 156 LBS | TEMPERATURE: 98.1 F

## 2023-09-05 DIAGNOSIS — E03.8 SUBCLINICAL HYPOTHYROIDISM: ICD-10-CM

## 2023-09-05 DIAGNOSIS — D50.9 MICROCYTIC ANEMIA: ICD-10-CM

## 2023-09-05 DIAGNOSIS — K31.A0 GASTRIC INTESTINAL METAPLASIA: Primary | ICD-10-CM

## 2023-09-05 DIAGNOSIS — K76.0 STEATOSIS OF LIVER: ICD-10-CM

## 2023-09-05 LAB
ALBUMIN SERPL BCG-MCNC: 3.8 G/DL (ref 3.5–5.2)
ALP SERPL-CCNC: 39 U/L (ref 40–129)
ALT SERPL W P-5'-P-CCNC: 26 U/L (ref 0–70)
ANION GAP SERPL CALCULATED.3IONS-SCNC: 9 MMOL/L (ref 7–15)
AST SERPL W P-5'-P-CCNC: 50 U/L (ref 0–45)
BILIRUB SERPL-MCNC: 0.4 MG/DL
BUN SERPL-MCNC: 6 MG/DL (ref 6–20)
CALCIUM SERPL-MCNC: 9 MG/DL (ref 8.6–10)
CHLORIDE SERPL-SCNC: 106 MMOL/L (ref 98–107)
CREAT SERPL-MCNC: 1.06 MG/DL (ref 0.67–1.17)
DEPRECATED HCO3 PLAS-SCNC: 22 MMOL/L (ref 22–29)
ERYTHROCYTE [DISTWIDTH] IN BLOOD BY AUTOMATED COUNT: 24.3 % (ref 10–15)
GFR SERPL CREATININE-BSD FRML MDRD: 85 ML/MIN/1.73M2
GLUCOSE SERPL-MCNC: 127 MG/DL (ref 70–99)
HCT VFR BLD AUTO: 28.1 % (ref 40–53)
HGB BLD-MCNC: 7 G/DL (ref 13.3–17.7)
MCH RBC QN AUTO: 16.3 PG (ref 26.5–33)
MCHC RBC AUTO-ENTMCNC: 24.9 G/DL (ref 31.5–36.5)
MCV RBC AUTO: 65 FL (ref 78–100)
PLATELET # BLD AUTO: 315 10E3/UL (ref 150–450)
POTASSIUM SERPL-SCNC: 4.2 MMOL/L (ref 3.4–5.3)
PROT SERPL-MCNC: 7.1 G/DL (ref 6.4–8.3)
RBC # BLD AUTO: 4.3 10E6/UL (ref 4.4–5.9)
SODIUM SERPL-SCNC: 137 MMOL/L (ref 136–145)
TSH SERPL DL<=0.005 MIU/L-ACNC: 7.48 UIU/ML (ref 0.3–4.2)
WBC # BLD AUTO: 6.4 10E3/UL (ref 4–11)

## 2023-09-05 PROCEDURE — 99214 OFFICE O/P EST MOD 30 MIN: CPT | Performed by: FAMILY MEDICINE

## 2023-09-05 PROCEDURE — 85027 COMPLETE CBC AUTOMATED: CPT | Performed by: FAMILY MEDICINE

## 2023-09-05 PROCEDURE — 84443 ASSAY THYROID STIM HORMONE: CPT | Performed by: FAMILY MEDICINE

## 2023-09-05 PROCEDURE — 80053 COMPREHEN METABOLIC PANEL: CPT | Performed by: FAMILY MEDICINE

## 2023-09-05 PROCEDURE — 36415 COLL VENOUS BLD VENIPUNCTURE: CPT | Performed by: FAMILY MEDICINE

## 2023-09-05 NOTE — PROGRESS NOTES
ASSESMENT AND PLAN:  Diagnoses and all orders for this visit:  Gastric intestinal metaplasia  Reviewed upper endoscopy and pathology results with the patient with the help of a professional .  His initial hemoglobin result today comes back at 7.0, being sent out for verification, this is in the similar range to his hemoglobin from 4 months ago when it was 7.5.  He is up-to-date on his colonoscopy, having completed in 2022, and his most recent EGD was just last month showing a superficial gastric ulcer, this was biopsied and the biopsy results did show gastric intestinal metaplasia.  Patient counseled on the importance of staying on his omeprazole every day and following up with GI as directed.  I am also going to send a message to his GI physician to see whether the persistent significant anemia along with his gastric intestinal metaplasia would mean that he should have something like a capsule study.  Subclinical hypothyroidism  Patient reports that he has not been as good at taking his levothyroxine every day as he has been with the omeprazole.  Counseled on the importance of taking levothyroxine daily.  Checking labs today.  -     TSH; Future  Steatosis of liver  Counseled importance of healthy eating and regular exercise.  Due for labs.  -     Comprehensive metabolic panel (BMP + Alb, Alk Phos, ALT, AST, Total. Bili, TP); Future  Microcytic anemia  -     CBC with platelets, see above.      Reviewed the risks and benefits of the treatment plan with the patient and/or caregiver and we discussed indications for routine and emergent follow-up.        SUBJECTIVE: Patient here for follow-up on multiple conditions.  He reports that he has been feeling well.  No current problems with abdominal pain or vomiting or blood in the stool.  Patient has not used any alcohol since I saw him last.  He is working on healthy eating and regular exercise.  He is taking his omeprazole every day but reports that he sometimes  "forgets to take his levothyroxine.    Past Medical History:   Diagnosis Date    GERD (gastroesophageal reflux disease)     Hematochezia     Hepatitis     ETOH    Hyperlipidemia     Lower Back Pain     Created by Conversion     Seizure (H)      Patient Active Problem List   Diagnosis    Nicotine Dependence    Hemorrhoids    Serum Enzyme Levels - AST (SGOT) Elevated    Accessory Navicular    Gastritis Due To H. Pylori    Bone Cyst    Hypercholesteremia    Gastritis    Insomnia    Alcoholic liver damage (H)    Polyp of large intestine    Elevated liver enzymes    Oral leukoplakia    Subclinical hypothyroidism    Alcohol abuse    Nodule of upper lobe of left lung    Restless legs syndrome    Gastroesophageal reflux disease, unspecified whether esophagitis present    Obstructive sleep apnea    Nephrolithiasis    Alcohol dependence in remission (H)    Angiodysplasia of colon    Benign neoplasm of ascending colon    Epigastric pain    Hemorrhage of rectum and anus    History of colonic polyps    Nonalcoholic fatty liver disease    Steatosis of liver    Diverticular disease of large intestine    Polyp of colon    Esophagitis    Gastric intestinal metaplasia     Current Outpatient Medications   Medication Sig Dispense Refill    levothyroxine (SYNTHROID/LEVOTHROID) 25 MCG tablet Take 1 tablet (25 mcg) by mouth daily 90 tablet 3    omeprazole (PRILOSEC) 20 MG DR capsule Take 1 capsule (20 mg) by mouth daily 90 capsule 3     History   Smoking Status    Every Day    Packs/day: 0.50    Types: Cigarettes   Smokeless Tobacco    Former    Types: Chew       OBJECTICE: /69 (BP Location: Right arm, Patient Position: Sitting, Cuff Size: Adult Regular)   Pulse 89   Temp 98.1  F (36.7  C) (Oral)   Resp 18   Ht 1.575 m (5' 2\")   Wt 70.8 kg (156 lb)   SpO2 100%   BMI 28.53 kg/m       No results found for this or any previous visit (from the past 24 hour(s)).     GEN-alert, appropriate, in no apparent distress   HEENT-no " palpable thyroid abnormality or tenderness   CV-regular rate and rhythm with no murmur   RESP-lungs clear to auscultation   ABDOMINAL-soft, nontender, no palpable mass       Boaz Bell MD

## 2023-10-14 ENCOUNTER — HEALTH MAINTENANCE LETTER (OUTPATIENT)
Age: 53
End: 2023-10-14

## 2024-03-05 ENCOUNTER — OFFICE VISIT (OUTPATIENT)
Dept: FAMILY MEDICINE | Facility: CLINIC | Age: 54
End: 2024-03-05
Payer: COMMERCIAL

## 2024-03-05 ENCOUNTER — TELEPHONE (OUTPATIENT)
Dept: FAMILY MEDICINE | Facility: CLINIC | Age: 54
End: 2024-03-05

## 2024-03-05 VITALS
SYSTOLIC BLOOD PRESSURE: 121 MMHG | BODY MASS INDEX: 29.4 KG/M2 | RESPIRATION RATE: 16 BRPM | HEART RATE: 85 BPM | OXYGEN SATURATION: 100 % | WEIGHT: 159.75 LBS | DIASTOLIC BLOOD PRESSURE: 70 MMHG | TEMPERATURE: 97.7 F | HEIGHT: 62 IN

## 2024-03-05 DIAGNOSIS — E03.8 SUBCLINICAL HYPOTHYROIDISM: ICD-10-CM

## 2024-03-05 DIAGNOSIS — E78.00 HYPERCHOLESTEREMIA: ICD-10-CM

## 2024-03-05 DIAGNOSIS — F10.21 ALCOHOL DEPENDENCE IN REMISSION (H): ICD-10-CM

## 2024-03-05 DIAGNOSIS — Z00.00 ROUTINE GENERAL MEDICAL EXAMINATION AT HEALTH CARE FACILITY: Primary | ICD-10-CM

## 2024-03-05 DIAGNOSIS — K70.9 ALCOHOLIC LIVER DAMAGE (H): ICD-10-CM

## 2024-03-05 DIAGNOSIS — D50.9 MICROCYTIC ANEMIA: ICD-10-CM

## 2024-03-05 LAB
ERYTHROCYTE [DISTWIDTH] IN BLOOD BY AUTOMATED COUNT: 22.9 % (ref 10–15)
HBA1C MFR BLD: 4.9 % (ref 0–5.6)
HCT VFR BLD AUTO: 30.3 % (ref 40–53)
HGB BLD-MCNC: 7.5 G/DL (ref 13.3–17.7)
MCH RBC QN AUTO: 16.2 PG (ref 26.5–33)
MCHC RBC AUTO-ENTMCNC: 24.8 G/DL (ref 31.5–36.5)
MCV RBC AUTO: 65 FL (ref 78–100)
PLATELET # BLD AUTO: 288 10E3/UL (ref 150–450)
RBC # BLD AUTO: 4.63 10E6/UL (ref 4.4–5.9)
WBC # BLD AUTO: 7.6 10E3/UL (ref 4–11)

## 2024-03-05 PROCEDURE — G0103 PSA SCREENING: HCPCS | Performed by: FAMILY MEDICINE

## 2024-03-05 PROCEDURE — 99214 OFFICE O/P EST MOD 30 MIN: CPT | Mod: 25 | Performed by: FAMILY MEDICINE

## 2024-03-05 PROCEDURE — 83036 HEMOGLOBIN GLYCOSYLATED A1C: CPT | Performed by: FAMILY MEDICINE

## 2024-03-05 PROCEDURE — 99396 PREV VISIT EST AGE 40-64: CPT | Performed by: FAMILY MEDICINE

## 2024-03-05 PROCEDURE — 36415 COLL VENOUS BLD VENIPUNCTURE: CPT | Performed by: FAMILY MEDICINE

## 2024-03-05 PROCEDURE — 84443 ASSAY THYROID STIM HORMONE: CPT | Performed by: FAMILY MEDICINE

## 2024-03-05 PROCEDURE — 80061 LIPID PANEL: CPT | Performed by: FAMILY MEDICINE

## 2024-03-05 PROCEDURE — 85027 COMPLETE CBC AUTOMATED: CPT | Performed by: FAMILY MEDICINE

## 2024-03-05 SDOH — HEALTH STABILITY: PHYSICAL HEALTH: ON AVERAGE, HOW MANY DAYS PER WEEK DO YOU ENGAGE IN MODERATE TO STRENUOUS EXERCISE (LIKE A BRISK WALK)?: 7 DAYS

## 2024-03-05 ASSESSMENT — SOCIAL DETERMINANTS OF HEALTH (SDOH): HOW OFTEN DO YOU GET TOGETHER WITH FRIENDS OR RELATIVES?: ONCE A WEEK

## 2024-03-05 NOTE — PROGRESS NOTES
ASSESMENT AND PLAN:    Physical, Health [place] -   Reviewed healthy lifestyle, diet, exercise, vitamins, and follow-up plan today with patient.  Reviewed age appropriate cancer and other screening recommendations.  Immunization review and update done.  Reviewed indicated lab tests, see lab orders.   -     Lipid panel reflex to direct LDL Non-fasting; Future  -     Hemoglobin A1c; Future  -     PSA, screen; Future  Hypercholesteremia  Counseled the patient on healthy eating and regular exercise.  Due for labs.  -     Lipid panel reflex to direct LDL Non-fasting; Future  Subclinical hypothyroidism  -     TSH; Future  We will call the patient with his results tomorrow or the next day to let him know if an adjustment is required in his levothyroxine dosing.    Addendum-TSH comes back with persistent elevation, will increase his dose of levothyroxine and plan to recheck again in 4 months.    Alcoholic liver damage (H24)  Stable.  Most recent liver enzymes looked good with a very mild elevation in the AST at 50 and a normal ALT.  Alcohol dependence in remission (H)  Counseling done with the patient with the help of a professional .  Congratulated on his ongoing sobriety.  Microcytic anemia  -     CBC with platelets shows ongoing severe anemia.  His hemoglobin comes back at 7.5 on the initial test and is being sent out for verification.  Normal white count at 7.2 and normal platelets at 260,000.  Patient has a complex GI history and is followed by gastroenterology at [facility].  Please see my previous note for details.  He is up-to-date on upper endoscopy and colonoscopy.  I think it would be a good idea for the patient to see hematology at this point.  Referral entered.  I will also send this note to his gastroenterologist Dr Bolaños so that any further evaluation or GI workup that is required can be arranged.        HPI: Patient in for his physical and follow-up on his chronic conditions.  He  reports that he continues to feel a moderate to severe sense of fatigue and low energy.  Patient has a history of a severe anemia down to 7.  He has complex GI history.  He has not been getting blood in the stool or black tarry stools.  He has not been using any alcohol, no alcohol intake at all since I last saw him.  He also has a history of hypothyroidism and reports that he is taking his levothyroxine every day.  He is also taking his omeprazole every day.    ROS:No chest pain, no shortness of breath, no blood in the urine, no blood in the stool, no skin lesions that have been changing, remainder of review of systems is as above or negative.    Past Medical History:   Diagnosis Date    GERD (gastroesophageal reflux disease)     Hematochezia     Hepatitis     ETOH    Hyperlipidemia     Lower Back Pain     Created by Conversion     Seizure (H)        Current Outpatient Medications   Medication Sig Dispense Refill    levothyroxine (SYNTHROID/LEVOTHROID) 25 MCG tablet Take 1 tablet (25 mcg) by mouth daily 90 tablet 3    omeprazole (PRILOSEC) 20 MG DR capsule Take 1 capsule (20 mg) by mouth daily 90 capsule 3       Patient Active Problem List   Diagnosis    Nicotine Dependence    Hemorrhoids    Serum Enzyme Levels - AST (SGOT) Elevated    Accessory Navicular    Gastritis Due To H. Pylori    Bone Cyst    Hypercholesteremia    Gastritis    Insomnia    Alcoholic liver damage (H24)    Polyp of large intestine    Elevated liver enzymes    Oral leukoplakia    Subclinical hypothyroidism    Alcohol abuse    Nodule of upper lobe of left lung    Restless legs syndrome    Gastroesophageal reflux disease, unspecified whether esophagitis present    Obstructive sleep apnea    Nephrolithiasis    Alcohol dependence in remission (H)    Angiodysplasia of colon    Benign neoplasm of ascending colon    Epigastric pain    Hemorrhage of rectum and anus    History of colonic polyps    Nonalcoholic fatty liver disease    Steatosis of liver     Diverticular disease of large intestine    Polyp of colon    Esophagitis    Gastric intestinal metaplasia       Social History     Socioeconomic History    Marital status:      Spouse name: None    Number of children: None    Years of education: None    Highest education level: None   Tobacco Use    Smoking status: Every Day     Packs/day: .5     Types: Cigarettes     Passive exposure: Past    Smokeless tobacco: Former     Types: Chew    Tobacco comments:     Betelnut without Tobacco   Vaping Use    Vaping Use: Never used   Substance and Sexual Activity    Alcohol use: No     Comment: Alcoholic Drinks/day: no longer drinking since 4/1/2017    Drug use: No    Sexual activity: Yes     Partners: Female     Social Determinants of Health     Financial Resource Strain: Low Risk  (3/5/2024)    Financial Resource Strain     Within the past 12 months, have you or your family members you live with been unable to get utilities (heat, electricity) when it was really needed?: No   Food Insecurity: Low Risk  (3/5/2024)    Food Insecurity     Within the past 12 months, did you worry that your food would run out before you got money to buy more?: No     Within the past 12 months, did the food you bought just not last and you didn t have money to get more?: No   Transportation Needs: Low Risk  (3/5/2024)    Transportation Needs     Within the past 12 months, has lack of transportation kept you from medical appointments, getting your medicines, non-medical meetings or appointments, work, or from getting things that you need?: No   Physical Activity: Unknown (3/5/2024)    Exercise Vital Sign     Days of Exercise per Week: 7 days   Stress: No Stress Concern Present (3/5/2024)    [employer] [place] [employer] - Occupational Stress Questionnaire     Feeling of Stress : Only a little   Social Connections: Unknown (3/5/2024)    Social Connection and Isolation Panel [NHANES]     Frequency of Social Gatherings with  "Friends and Family: Once a week   Interpersonal Safety: Low Risk  (3/5/2024)    Interpersonal Safety     Do you feel physically and emotionally safe where you currently live?: Yes     Within the past 12 months, have you been hit, slapped, kicked or otherwise physically hurt by someone?: No     Within the past 12 months, have you been humiliated or emotionally abused in other ways by your partner or ex-partner?: No   Housing Stability: Low Risk  (3/5/2024)    Housing Stability     Do you have housing? : Yes     Are you worried about losing your housing?: No       History   Smoking Status    Every Day    Packs/day: 0.50    Types: Cigarettes   Smokeless Tobacco    Former    Types: Chew       OBJECTICE: /70 (BP Location: Right arm, Patient Position: Sitting, Cuff Size: Adult Large)   Pulse 85   Temp 97.7  F (36.5  C) (Temporal)   Resp 16   Ht 1.576 m (5' 2.05\")   Wt 72.5 kg (159 lb 12 oz)   SpO2 100%   BMI 29.17 kg/m        Gen - alert, orientated, NAD  Eyes - fundascopic exam limited by the undialated pupil but looks symmetric  ENT - oropharynx clear, TMs clear  Neck - supple, no palpable mass or lymphadenopathy  CV - RRR, no murmur  Resp - lungs CTA  Ab - soft, nontender, no palpable mass or organomegaly   - normal appearance to the external genetalia, normal testicular exam bilaterally, no hernia  Extrem - warm, no edema  Neuro - CN II-XII intact, strength, sensation, reflexes intact and symmetric  Skin - no rash, no atypical appearing lesions seen.             3/5/2024   General Health   How would you rate your overall physical health? Good   Feel stress (tense, anxious, or unable to sleep) Only a little   (!) STRESS CONCERN      3/5/2024   Nutrition   Three or more servings of calcium each day? Yes   Diet: Low fat/cholesterol    Other   If other, please elaborate: no spicy foods   How many servings of fruit and vegetables per day? (!) 0-1   How many sweetened beverages each day? 0-1         3/5/2024 "   Exercise   Days per week of moderate/strenous exercise 7 days         3/5/2024   Social Factors   Frequency of gathering with friends or relatives Once a week   Worry food won't last until get money to buy more No   Food not last or not have enough money for food? No   Do you have housing?  Yes   Are you worried about losing your housing? No   Lack of transportation? No   Unable to get utilities (heat,electricity)? No         3/5/2024   Fall Risk   Fallen 2 or more times in the past year? No   Trouble with walking or balance? No          3/5/2024   Dental   Dentist two times every year? Yes         3/5/2024   TB Screening   Were you born outside of [place]?  (!) YES           Today's PHQ-2 Score:       3/5/2024     9:45 AM   PHQ-2 ( 1999 [employer])   Q1: Little interest or pleasure in doing things 0   Q2: Feeling down, depressed or hopeless 0   PHQ-2 Score 0   Q1: Little interest or pleasure in doing things Not at all   Q2: Feeling down, depressed or hopeless Not at all   PHQ-2 Score 0           3/5/2024   Substance Use   Alcohol more than 3/day or more than 7/wk Not Applicable   Do you use any other substances recreationally? No     Social History     Tobacco Use    Smoking status: Every Day     Packs/day: .5     Types: Cigarettes     Passive exposure: Past    Smokeless tobacco: Former     Types: Chew    Tobacco comments:     Betelnut without Tobacco   Vaping Use    Vaping Use: Never used   Substance Use Topics    Alcohol use: No     Comment: Alcoholic Drinks/day: no longer drinking since 4/1/2017    Drug use: No           3/5/2024   STI Screening   New sexual partner(s) since last STI/HIV test? No   ASCVD Risk   The 10-year ASCVD risk score (Doretha TRAN, et al., 2019) is: 8.9%    Values used to calculate the score:      Age: 53 years      Sex: Male      Is Non- : No      Diabetic: No      Tobacco smoker: Yes      Systolic Blood Pressure: 121 mmHg      Is BP treated: No      HDL  Cholesterol: 33 mg/dL      Total Cholesterol: 168 mg/dL      Reviewed and updated as needed this visit by Provider

## 2024-03-05 NOTE — TELEPHONE ENCOUNTER
----- Message from Boaz Bell MD sent at 3/5/2024  2:55 PM CST -----  Team - please call patient with results.   Hemoglobin continues to be very low so I would like him to see a blood specialist-hematology consult order entered.  I would also like him to follow-up with his gastroenterologist Dr. Bolaños at [facility].  I will send her the lab result so she is aware.  Thanks.

## 2024-03-05 NOTE — TELEPHONE ENCOUNTER
See below.  Patient is already scheduled with gastroenterology Dr. Bolaños.  New referral entered for hematology consult.  Thanks.

## 2024-03-05 NOTE — TELEPHONE ENCOUNTER
Informed pt and his wife. They needed help to set up appointment with Hematologist and follow up with Gastroenterologist.

## 2024-03-06 ENCOUNTER — PATIENT OUTREACH (OUTPATIENT)
Dept: GASTROENTEROLOGY | Facility: CLINIC | Age: 54
End: 2024-03-06
Payer: COMMERCIAL

## 2024-03-06 LAB
CHOLEST SERPL-MCNC: 156 MG/DL
FASTING STATUS PATIENT QL REPORTED: NO
HDLC SERPL-MCNC: 42 MG/DL
LDLC SERPL CALC-MCNC: 89 MG/DL
NONHDLC SERPL-MCNC: 114 MG/DL
PSA SERPL DL<=0.01 NG/ML-MCNC: 0.53 NG/ML (ref 0–3.5)
TRIGL SERPL-MCNC: 125 MG/DL
TSH SERPL DL<=0.005 MIU/L-ACNC: 4.96 UIU/ML (ref 0.3–4.2)

## 2024-03-06 RX ORDER — LEVOTHYROXINE SODIUM 50 UG/1
50 TABLET ORAL DAILY
Qty: 90 TABLET | Refills: 3 | Status: SHIPPED | OUTPATIENT
Start: 2024-03-06

## 2024-03-06 NOTE — TELEPHONE ENCOUNTER
----- Message from Boaz Bell MD sent at 3/6/2024 10:32 AM CST -----  Team - please call patient with results.  The thyroid blood test shows that he needs a slightly higher dose of his thyroid medication.  I sent the change to his pharmacy and he should now take a 50 mcg tablet once per day.    Called patient/wife, Beh Htoo Dway with assistance of an  to relay provider test result message above.  Pharmacy medication sent to also provided.    Yandel Pizano, SELINAN, RN  [facility]

## 2024-03-07 ENCOUNTER — PATIENT OUTREACH (OUTPATIENT)
Dept: ONCOLOGY | Facility: CLINIC | Age: 54
End: 2024-03-07
Payer: COMMERCIAL

## 2024-03-07 NOTE — PROGRESS NOTES
[facility]: Hematology                                                                Hem/Onc  Referral reviewed     ASSESSMENT      Clinical History (per Nurse review of records provided):    53 year old male patient with chronic anemia, referred back to GI and to hematology    Residence:  [place]    PCP: Boaz Bell  Records Location: [facility]   Pertinent labs -- BOOKMARKED  CBC RESULTS:   Recent Labs   Lab Test 03/05/24  1022   WBC 7.6   RBC 4.63   HGB 7.5*   HCT 30.3*   MCV 65*   MCH 16.2*   MCHC 24.8*   RDW 22.9*      No diff    Referring provider note(s)-- BOOKMARKED    INTERVENTION(S)                                                      -Referral Triage Scheduling Instructions added to Hem/Onc  referral for Patient Access rep ( number below, hours are Monday - Friday 8am - 4:30 pm)   who will contact pt in the next 1-2 business days to schedule the consultation.    PLAN                                                      Hematology consult      See records team's Pre-Visit encounter documentation for additional records retrieval information.    Emma Chauhan, RN, BSN, OCN  Hematology/Oncology New Patient Nurse Navigator   [facility]  [phone]

## 2024-03-13 NOTE — TELEPHONE ENCOUNTER
RECORDS STATUS - ALL OTHER DIAGNOSIS      RECORDS RECEIVED FROM: [facility] - Internal records   DATE RECEIVED: 3/13

## 2024-03-16 ENCOUNTER — PRE VISIT (OUTPATIENT)
Dept: ONCOLOGY | Facility: HOSPITAL | Age: 54
End: 2024-03-16
Payer: COMMERCIAL

## 2024-03-16 NOTE — LETTER
Salas Clinton  [address]  [place] [address]          March 14, 2024        Dear Salas Clinton,     Thank you for choosing [facility]. We are committed to providing you with exceptional, compassionate, and personalized care from a team of experts with decades of experience of helping patients and their families navigate their health journey.  Please visit our webpage to learn more about the comprehensive supportive care services available at [facility] to help improve the quality of life and reduce the burden of illness.  [email or web address]      The information provided in this packet pertains to your upcoming appointment. We encourage you to visit our website for the most up-to-date information on visitor restrictions at our clinics and hospitals. [email or web address]      [facility]: Login to your [facility] account to communicate with your care team, begin an eVisit, renew a medication, manage appointments, and more. The [facility] Technical Assistance line can assist with any questions you may have. They can be reached at [phone]. [email or web address]     Billing and Financial information can be found at: [email or web address]     Date and time of appointment: 3/26/2024 AT 11:00AM     Location of appointment:    [facility]                                                  Floor [address]                                                  [address] [place]  [address]     Provider name: Tunde Solis MD     What to bring to your appointment:    Insurance card  's license or other form of photo ID  List of any medications you are taking       Questions you may have for your provider     Virtual Visit Resources:  [email or web address]     We appreciate the opportunity to  care for you. If you have any questions prior to your appointment, please do not hesitate to call us at [phone]. We kindly ask that if you need to cancel or reschedule your appointment that you call us at least 48 hours prior to the appointment.      Sincerely,  [facility] Patient Specialty Intake Team  Please visit us at [email or web address]

## 2024-03-26 ENCOUNTER — LAB (OUTPATIENT)
Dept: INFUSION THERAPY | Facility: HOSPITAL | Age: 54
End: 2024-03-26
Attending: INTERNAL MEDICINE
Payer: COMMERCIAL

## 2024-03-26 ENCOUNTER — ONCOLOGY VISIT (OUTPATIENT)
Dept: ONCOLOGY | Facility: HOSPITAL | Age: 54
End: 2024-03-26
Attending: FAMILY MEDICINE
Payer: COMMERCIAL

## 2024-03-26 VITALS
TEMPERATURE: 97.6 F | HEART RATE: 88 BPM | SYSTOLIC BLOOD PRESSURE: 156 MMHG | OXYGEN SATURATION: 100 % | HEIGHT: 63 IN | WEIGHT: 157 LBS | DIASTOLIC BLOOD PRESSURE: 74 MMHG | BODY MASS INDEX: 27.82 KG/M2 | RESPIRATION RATE: 18 BRPM

## 2024-03-26 DIAGNOSIS — D50.9 MICROCYTIC ANEMIA: ICD-10-CM

## 2024-03-26 DIAGNOSIS — D50.9 MICROCYTIC ANEMIA: Primary | ICD-10-CM

## 2024-03-26 LAB
ALBUMIN SERPL BCG-MCNC: 4.2 G/DL (ref 3.5–5.2)
ALBUMIN UR-MCNC: NEGATIVE MG/DL
ALP SERPL-CCNC: 47 U/L (ref 40–150)
ALT SERPL W P-5'-P-CCNC: 25 U/L (ref 0–70)
ANION GAP SERPL CALCULATED.3IONS-SCNC: 8 MMOL/L (ref 7–15)
APPEARANCE UR: CLEAR
AST SERPL W P-5'-P-CCNC: 32 U/L (ref 0–45)
BASOPHILS # BLD AUTO: 0 10E3/UL (ref 0–0.2)
BASOPHILS NFR BLD AUTO: 1 %
BILIRUB SERPL-MCNC: 0.4 MG/DL
BILIRUB UR QL STRIP: NEGATIVE
BUN SERPL-MCNC: 5.8 MG/DL (ref 6–20)
CALCIUM SERPL-MCNC: 9.3 MG/DL (ref 8.6–10)
CHLORIDE SERPL-SCNC: 107 MMOL/L (ref 98–107)
COLOR UR AUTO: NORMAL
CREAT SERPL-MCNC: 1.02 MG/DL (ref 0.67–1.17)
DEPRECATED HCO3 PLAS-SCNC: 26 MMOL/L (ref 22–29)
EGFRCR SERPLBLD CKD-EPI 2021: 89 ML/MIN/1.73M2
EOSINOPHIL # BLD AUTO: 0.4 10E3/UL (ref 0–0.7)
EOSINOPHIL NFR BLD AUTO: 4 %
ERYTHROCYTE [DISTWIDTH] IN BLOOD BY AUTOMATED COUNT: 22.2 % (ref 10–15)
FOLATE SERPL-MCNC: 18.4 NG/ML (ref 4.6–34.8)
GLUCOSE SERPL-MCNC: 99 MG/DL (ref 70–99)
GLUCOSE UR STRIP-MCNC: NEGATIVE MG/DL
HCT VFR BLD AUTO: 29.3 % (ref 40–53)
HGB BLD-MCNC: 7.3 G/DL (ref 13.3–17.7)
HGB UR QL STRIP: NEGATIVE
IMM GRANULOCYTES # BLD: 0 10E3/UL
IMM GRANULOCYTES NFR BLD: 0 %
IRON BINDING CAPACITY (ROCHE): 373 UG/DL (ref 240–430)
IRON SATN MFR SERPL: 5 % (ref 15–46)
IRON SERPL-MCNC: 17 UG/DL (ref 61–157)
KETONES UR STRIP-MCNC: NEGATIVE MG/DL
LEUKOCYTE ESTERASE UR QL STRIP: NEGATIVE
LYMPHOCYTES # BLD AUTO: 2.8 10E3/UL (ref 0.8–5.3)
LYMPHOCYTES NFR BLD AUTO: 35 %
MCH RBC QN AUTO: 16.2 PG (ref 26.5–33)
MCHC RBC AUTO-ENTMCNC: 24.9 G/DL (ref 31.5–36.5)
MCV RBC AUTO: 65 FL (ref 78–100)
MONOCYTES # BLD AUTO: 0.9 10E3/UL (ref 0–1.3)
MONOCYTES NFR BLD AUTO: 11 %
NEUTROPHILS # BLD AUTO: 3.8 10E3/UL (ref 1.6–8.3)
NEUTROPHILS NFR BLD AUTO: 49 %
NITRATE UR QL: NEGATIVE
NRBC # BLD AUTO: 0 10E3/UL
NRBC BLD AUTO-RTO: 0 /100
PH UR STRIP: 7 [PH] (ref 5–7)
PLATELET # BLD AUTO: 274 10E3/UL (ref 150–450)
POTASSIUM SERPL-SCNC: 3.7 MMOL/L (ref 3.4–5.3)
PROT SERPL-MCNC: 8 G/DL (ref 6.4–8.3)
RBC # BLD AUTO: 4.51 10E6/UL (ref 4.4–5.9)
RETICS # AUTO: 0.09 10E6/UL (ref 0.03–0.1)
RETICS/RBC NFR AUTO: 2.1 % (ref 0.5–2)
SODIUM SERPL-SCNC: 141 MMOL/L (ref 135–145)
SP GR UR STRIP: 1.01 (ref 1–1.03)
UROBILINOGEN UR STRIP-MCNC: <2 MG/DL
WBC # BLD AUTO: 7.9 10E3/UL (ref 4–11)

## 2024-03-26 PROCEDURE — 82746 ASSAY OF FOLIC ACID SERUM: CPT

## 2024-03-26 PROCEDURE — 82607 VITAMIN B-12: CPT

## 2024-03-26 PROCEDURE — 80053 COMPREHEN METABOLIC PANEL: CPT

## 2024-03-26 PROCEDURE — 83550 IRON BINDING TEST: CPT

## 2024-03-26 PROCEDURE — G0463 HOSPITAL OUTPT CLINIC VISIT: HCPCS | Performed by: INTERNAL MEDICINE

## 2024-03-26 PROCEDURE — 36415 COLL VENOUS BLD VENIPUNCTURE: CPT

## 2024-03-26 PROCEDURE — 81003 URINALYSIS AUTO W/O SCOPE: CPT

## 2024-03-26 PROCEDURE — G2211 COMPLEX E/M VISIT ADD ON: HCPCS | Performed by: INTERNAL MEDICINE

## 2024-03-26 PROCEDURE — 85045 AUTOMATED RETICULOCYTE COUNT: CPT

## 2024-03-26 PROCEDURE — 85025 COMPLETE CBC W/AUTO DIFF WBC: CPT

## 2024-03-26 PROCEDURE — 99204 OFFICE O/P NEW MOD 45 MIN: CPT | Performed by: INTERNAL MEDICINE

## 2024-03-26 PROCEDURE — 82728 ASSAY OF FERRITIN: CPT

## 2024-03-26 RX ORDER — IBUPROFEN 200 MG
400 TABLET ORAL PRN
COMMUNITY

## 2024-03-26 RX ORDER — FERROUS SULFATE 325(65) MG
325 TABLET ORAL 2 TIMES DAILY
Qty: 60 TABLET | Refills: 1 | Status: SHIPPED | OUTPATIENT
Start: 2024-03-26

## 2024-03-26 RX ORDER — ACETAMINOPHEN 500 MG
1000 TABLET ORAL PRN
COMMUNITY

## 2024-03-26 ASSESSMENT — PAIN SCALES - GENERAL: PAINLEVEL: NO PAIN (0)

## 2024-03-26 NOTE — LETTER
"    3/26/2024         RE: Salas Clinton  [address]  [place] [address]        Dear Colleague,    Thank you for referring your patient, Salas Clinton, to the [facility]. Please see a copy of my visit note below.    Oncology Rooming Note    March 26, 2024 11:06 AM   Salas Clinton is a 53 year old male who presents for:    Chief Complaint   Patient presents with     Hematology     New patient consult related to Microcytic anemia     Initial Vitals: BP (!) 156/74 (BP Location: Left arm, Patient Position: Sitting, Cuff Size: Adult Regular)   Pulse 88   Temp 97.6  F (36.4  C) (Tympanic)   Resp 18   Ht 1.588 m (5' 2.5\")   Wt 71.2 kg (157 lb)   SpO2 100%   BMI 28.26 kg/m   Estimated body mass index is 28.26 kg/m  as calculated from the following:    Height as of this encounter: 1.588 m (5' 2.5\").    Weight as of this encounter: 71.2 kg (157 lb). Body surface area is 1.77 meters squared.  No Pain (0) Comment: Data Unavailable   No LMP for male patient.  Allergies reviewed: Yes  Medications reviewed: Yes    Medications: Medication refills not needed today.  Pharmacy name entered into [employer]: [facility] #[id number] - [facility] [address] AT [facility] [address]    Frailty Screening:   Is the patient here for a new oncology consult visit in cancer care? 2. No      Clinical concerns: New patient consult related to Microcytic anemia      RENE GANDARA CMA              [facility] Hematology and Oncology Consult Note    Patient: Salas Clinton  MRN: [id number]  Date of Service: 03/26/2024      Reason for Visit    Chief Complaint   Patient presents with     Hematology     New patient consult related to Microcytic anemia       Assessment/Plan    Microcytic anemia  Anorexia and weight loss    Patient referred for evaluation of microcytic anemia.  This has developed over the last few years.  Previously had significant hemorrhoidal bleeding but this was stopped for the last year. "  Did have upper endoscopy and colonoscopy with note of gastric ulcer but no clear source of bleeding.    Lab work shows progressive microcytic anemia which is likely secondary to iron deficiency.    Will get lab work to evaluate anemia today.  Also check urinalysis and stool for occult blood.  Due to the anorexia and weight loss will get CT of the abdomen and pelvis to rule out malignancy.    Will start ferrous sulfate 325 mg twice daily.  Will have patient follow-up in a month with recheck of CBC and review labs and CT scans.    Questions answered.    Plan: Check labs for anemia workup today, urinalysis and stool for occult blood  CT abdomen and pelvis  Start ferrous sulfate 325 mg p.o. twice daily  Follow-up in a month with recheck of labs        ECOG Performance    0 - Independent    Problem List    Patient Active Problem List   Diagnosis     Nicotine Dependence     Hemorrhoids     Serum Enzyme Levels - AST (SGOT) Elevated     Accessory Navicular     Gastritis Due To H. Pylori     Bone Cyst     Hypercholesteremia     Gastritis     Insomnia     Alcoholic liver damage (H24)     Polyp of large intestine     Elevated liver enzymes     Oral leukoplakia     Subclinical hypothyroidism     Alcohol abuse     Nodule of upper lobe of left lung     Restless legs syndrome     Gastroesophageal reflux disease, unspecified whether esophagitis present     Obstructive sleep apnea     Nephrolithiasis     Alcohol dependence in remission (H)     Angiodysplasia of colon     Benign neoplasm of ascending colon     Epigastric pain     Hemorrhage of rectum and anus     History of colonic polyps     Nonalcoholic fatty liver disease     Steatosis of liver     Diverticular disease of large intestine     Polyp of colon     Esophagitis     Gastric intestinal metaplasia     Microcytic anemia     ______________________________________________________________________________    Staging History    Cancer Staging   No matching staging information  was found for the patient.      History    53-year-old with past history of hyperlipidemia, hypothyroidism, alcoholic liver damage referred for further evaluation of microcytic anemia.  Last hemoglobin was 7.5 with MCV of 65 and 6 years ago he had hemoglobin of 15.5 and MCV of 92.    Has seen GI and had upper endoscopy showing gastric ulcer but without any bleeding.  Also had colonoscopy which did not show any active bleeding either.    Patient reports that for 3 to 4 years he had significant hemorrhoidal bleeding with each bowel movement but this has resolved over the past year.  Currently denies any blood in the stool or urine or dark stool or urine.  No urinary symptoms and normal bowel movements.  Does have some intermittent achy abdominal pain which is exacerbated by certain foods.  No nausea or vomiting.  Decreased appetite and 30 pound weight loss over the past year.    Describes poor sleep due to achiness in the legs and back.  No previous blood transfusion, oral or IV iron therapy.    No abdominal surgery.    Past medical history reviewed.  Past surgeries include: On the right leg in the forehead area.  He has not worked for the last couple years.  Smokes 3 cigarettes a day.  No alcohol currently.  No personal or family history of cancer.    Past History    Past Medical History:   Diagnosis Date     GERD (gastroesophageal reflux disease)      Hematochezia      Hepatitis     ETOH     Hyperlipidemia      Lower Back Pain     Created by Conversion      Seizure (H)     Family History   Problem Relation Age of Onset     Snoring Father       Past Surgical History:   Procedure Laterality Date     ESOPHAGOSCOPY, GASTROSCOPY, DUODENOSCOPY (EGD), COMBINED N/A 1/5/2023    Procedure: ESOPHAGOGASTRODUODENOSCOPY, WITH BIOPSY;  Surgeon: Susana Bolaños MD;  Location:  GI     ESOPHAGOSCOPY, GASTROSCOPY, DUODENOSCOPY (EGD), COMBINED N/A 8/24/2023    Procedure: ESOPHAGOGASTRODUODENOSCOPY, WITH BIOPSY;  Surgeon:  Susana Bolaños MD;  Location:  GI     FOOT SURGERY Right     Social History     Socioeconomic History     Marital status:      Spouse name: Not on file     Number of children: Not on file     Years of education: Not on file     Highest education level: Not on file   Occupational History     Not on file   Tobacco Use     Smoking status: Every Day     Types: Cigarettes     Passive exposure: Past     Smokeless tobacco: Former     Types: Chew     Tobacco comments:     Smoking 3 cigarettes daily and chewing Betelnut without Tobacco **as of March 2024**   Vaping Use     Vaping Use: Never used   Substance and Sexual Activity     Alcohol use: No     Comment: Alcoholic Drinks/day: no longer drinking since 4/1/2017     Drug use: No     Sexual activity: Yes     Partners: Female   Other Topics Concern     Parent/sibling w/ CABG, MI or angioplasty before 65F 55M? Not Asked   Social History Narrative     Not on file     Social Determinants of Health     Financial Resource Strain: Low Risk  (3/5/2024)    Financial Resource Strain      Within the past 12 months, have you or your family members you live with been unable to get utilities (heat, electricity) when it was really needed?: No   Food Insecurity: Low Risk  (3/5/2024)    Food Insecurity      Within the past 12 months, did you worry that your food would run out before you got money to buy more?: No      Within the past 12 months, did the food you bought just not last and you didn t have money to get more?: No   Transportation Needs: Low Risk  (3/5/2024)    Transportation Needs      Within the past 12 months, has lack of transportation kept you from medical appointments, getting your medicines, non-medical meetings or appointments, work, or from getting things that you need?: No   Physical Activity: Unknown (3/5/2024)    Exercise Vital Sign      Days of Exercise per Week: 7 days      Minutes of Exercise per Session: Not on file   Stress: No Stress  "Concern Present (3/5/2024)    [employer] - Occupational Stress Questionnaire      Feeling of Stress : Only a little   Social Connections: Unknown (3/5/2024)    Social Connection and Isolation Panel [NHANES]      Frequency of Communication with Friends and Family: Not on file      Frequency of Social Gatherings with Friends and Family: Once a week      Attends [employer] Services: Not on file      Active Member of Clubs or Organizations: Not on file      Attends Club or Organization Meetings: Not on file      Marital Status: Not on file   Interpersonal Safety: Low Risk  (3/5/2024)    Interpersonal Safety      Do you feel physically and emotionally safe where you currently live?: Yes      Within the past 12 months, have you been hit, slapped, kicked or otherwise physically hurt by someone?: No      Within the past 12 months, have you been humiliated or emotionally abused in other ways by your partner or ex-partner?: No   Housing Stability: Low Risk  (3/5/2024)    Housing Stability      Do you have housing? : Yes      Are you worried about losing your housing?: No        Allergies    Allergies   Allergen Reactions     Folic Acid Unknown       Review of Systems    Pertinent items are noted in HPI.      Physical Exam        3/26/2024    11:02 AM   Oncology Vitals   Height 159 cm   Weight 71.215 kg   BSA (m2) 1.77 m2   /74   Pulse 88   Temp 97.6  F (36.4  C)   Temp src Tympanic   SpO2 100 %   Pain Score 0 (None)       BP (!) 156/74 (BP Location: Left arm, Patient Position: Sitting, Cuff Size: Adult Regular)   Pulse 88   Temp 97.6  F (36.4  C) (Tympanic)   Resp 18   Ht 1.588 m (5' 2.5\")   Wt 71.2 kg (157 lb)   SpO2 100%   BMI 28.26 kg/m      General Appearance:    Alert, cooperative, no distress, appears stated age   Head:    Normocephalic, without obvious abnormality, atraumatic   Eyes:    PERRL, conjunctiva/corneas clear, EOM's intact, fundi     benign, both eyes        Ears:    " Normal TM's and external ear canals, both ears   Nose:   Nares normal, septum midline, mucosa normal, no drainage    or sinus tenderness   Throat:   Lips, mucosa, and tongue normal; teeth and gums normal   Neck:   Supple, symmetrical, trachea midline, no adenopathy;        thyroid:  No enlargement/tenderness/nodules; no carotid    bruit or JVD   Back:     Symmetric, no curvature, ROM normal, no CVA tenderness   Lungs:     Clear to auscultation bilaterally, respirations unlabored   Chest wall:    No tenderness or deformity   Heart:    Regular rate and rhythm, S1 and S2 normal, no murmur, rub   or gallop   Abdomen:     Soft, non-tender, bowel sounds active all four quadrants,     no masses, no organomegaly           Extremities:   Extremities normal, atraumatic, no cyanosis or edema   Pulses:   2+ and symmetric all extremities   Skin:   Skin color, texture, turgor normal, no rashes or lesions   Lymph nodes:   Cervical, supraclavicular, and axillary nodes normal   Neurologic:   CNII-XII intact. Normal strength, sensation and reflexes       throughout       Lab Results    CBC March 5 white count 7.6 hemoglobin 7.5 MCV 65 platelets 288, 6 years ago white count 9.9 hemoglobin 15.5 MCV 92 platelets 2's 86, CMP normal except AST of 50      Imaging Results    Patient CT chest reviewed.  Previous CTA of the chest abdomen pelvis from 2019 reviewed.      Signed by: Tunde Solis MD      Again, thank you for allowing me to participate in the care of your patient.        Sincerely,        Tunde Solis MD

## 2024-03-26 NOTE — PROGRESS NOTES
[facility] Hematology and Oncology Consult Note    Patient: Salas Clinton  MRN: [id number]  Date of Service: 03/26/2024      Reason for Visit    Chief Complaint   Patient presents with    Hematology     New patient consult related to Microcytic anemia       Assessment/Plan    Microcytic anemia  Anorexia and weight loss    Patient referred for evaluation of microcytic anemia.  This has developed over the last few years.  Previously had significant hemorrhoidal bleeding but this was stopped for the last year.  Did have upper endoscopy and colonoscopy with note of gastric ulcer but no clear source of bleeding.    Lab work shows progressive microcytic anemia which is likely secondary to iron deficiency.    Will get lab work to evaluate anemia today.  Also check urinalysis and stool for occult blood.  Due to the anorexia and weight loss will get CT of the abdomen and pelvis to rule out malignancy.    Will start ferrous sulfate 325 mg twice daily.  Will have patient follow-up in a month with recheck of CBC and review labs and CT scans.    Questions answered.    Plan: Check labs for anemia workup today, urinalysis and stool for occult blood  CT abdomen and pelvis  Start ferrous sulfate 325 mg p.o. twice daily  Follow-up in a month with recheck of labs        ECOG Performance    0 - Independent    Problem List    Patient Active Problem List   Diagnosis    Nicotine Dependence    Hemorrhoids    Serum Enzyme Levels - AST (SGOT) Elevated    Accessory Navicular    Gastritis Due To H. Pylori    Bone Cyst    Hypercholesteremia    Gastritis    Insomnia    Alcoholic liver damage (H24)    Polyp of large intestine    Elevated liver enzymes    Oral leukoplakia    Subclinical hypothyroidism    Alcohol abuse    Nodule of upper lobe of left lung    Restless legs syndrome    Gastroesophageal reflux disease, unspecified whether esophagitis present    Obstructive sleep apnea    Nephrolithiasis    Alcohol dependence in remission (H)     Angiodysplasia of colon    Benign neoplasm of ascending colon    Epigastric pain    Hemorrhage of rectum and anus    History of colonic polyps    Nonalcoholic fatty liver disease    Steatosis of liver    Diverticular disease of large intestine    Polyp of colon    Esophagitis    Gastric intestinal metaplasia    Microcytic anemia     ______________________________________________________________________________    Staging History    Cancer Staging   No matching staging information was found for the patient.      History    53-year-old with past history of hyperlipidemia, hypothyroidism, alcoholic liver damage referred for further evaluation of microcytic anemia.  Last hemoglobin was 7.5 with MCV of 65 and 6 years ago he had hemoglobin of 15.5 and MCV of 92.    Has seen GI and had upper endoscopy showing gastric ulcer but without any bleeding.  Also had colonoscopy which did not show any active bleeding either.    Patient reports that for 3 to 4 years he had significant hemorrhoidal bleeding with each bowel movement but this has resolved over the past year.  Currently denies any blood in the stool or urine or dark stool or urine.  No urinary symptoms and normal bowel movements.  Does have some intermittent achy abdominal pain which is exacerbated by certain foods.  No nausea or vomiting.  Decreased appetite and 30 pound weight loss over the past year.    Describes poor sleep due to achiness in the legs and back.  No previous blood transfusion, oral or IV iron therapy.    No abdominal surgery.    Past medical history reviewed.  Past surgeries include: On the right leg in the forehead area.  He has not worked for the last couple years.  Smokes 3 cigarettes a day.  No alcohol currently.  No personal or family history of cancer.    Past History    Past Medical History:   Diagnosis Date    GERD (gastroesophageal reflux disease)     Hematochezia     Hepatitis     ETOH    Hyperlipidemia     Lower Back Pain     Created by  Conversion     Seizure (H)     Family History   Problem Relation Age of Onset    Snoring Father       Past Surgical History:   Procedure Laterality Date    ESOPHAGOSCOPY, GASTROSCOPY, DUODENOSCOPY (EGD), COMBINED N/A 1/5/2023    Procedure: ESOPHAGOGASTRODUODENOSCOPY, WITH BIOPSY;  Surgeon: Susana Bolaños MD;  Location:  GI    ESOPHAGOSCOPY, GASTROSCOPY, DUODENOSCOPY (EGD), COMBINED N/A 8/24/2023    Procedure: ESOPHAGOGASTRODUODENOSCOPY, WITH BIOPSY;  Surgeon: Susana Bolaños MD;  Location:  GI    FOOT SURGERY Right     Social History     Socioeconomic History    Marital status:      Spouse name: Not on file    Number of children: Not on file    Years of education: Not on file    Highest education level: Not on file   Occupational History    Not on file   Tobacco Use    Smoking status: Every Day     Types: Cigarettes     Passive exposure: Past    Smokeless tobacco: Former     Types: Chew    Tobacco comments:     Smoking 3 cigarettes daily and chewing Betelnut without Tobacco **as of March 2024**   Vaping Use    Vaping Use: Never used   Substance and Sexual Activity    Alcohol use: No     Comment: Alcoholic Drinks/day: no longer drinking since 4/1/2017    Drug use: No    Sexual activity: Yes     Partners: Female   Other Topics Concern    Parent/sibling w/ CABG, MI or angioplasty before 65F 55M? Not Asked   Social History Narrative    Not on file     Social Determinants of Health     Financial Resource Strain: Low Risk  (3/5/2024)    Financial Resource Strain     Within the past 12 months, have you or your family members you live with been unable to get utilities (heat, electricity) when it was really needed?: No   Food Insecurity: Low Risk  (3/5/2024)    Food Insecurity     Within the past 12 months, did you worry that your food would run out before you got money to buy more?: No     Within the past 12 months, did the food you bought just not last and you didn t have money to get  more?: No   Transportation Needs: Low Risk  (3/5/2024)    Transportation Needs     Within the past 12 months, has lack of transportation kept you from medical appointments, getting your medicines, non-medical meetings or appointments, work, or from getting things that you need?: No   Physical Activity: Unknown (3/5/2024)    Exercise Vital Sign     Days of Exercise per Week: 7 days     Minutes of Exercise per Session: Not on file   Stress: No Stress Concern Present (3/5/2024)    [employer] - Occupational Stress Questionnaire     Feeling of Stress : Only a little   Social Connections: Unknown (3/5/2024)    Social Connection and Isolation Panel [NHANES]     Frequency of Communication with Friends and Family: Not on file     Frequency of Social Gatherings with Friends and Family: Once a week     Attends Religion Services: Not on file     Active Member of Clubs or Organizations: Not on file     Attends Club or Organization Meetings: Not on file     Marital Status: Not on file   Interpersonal Safety: Low Risk  (3/5/2024)    Interpersonal Safety     Do you feel physically and emotionally safe where you currently live?: Yes     Within the past 12 months, have you been hit, slapped, kicked or otherwise physically hurt by someone?: No     Within the past 12 months, have you been humiliated or emotionally abused in other ways by your partner or ex-partner?: No   Housing Stability: Low Risk  (3/5/2024)    Housing Stability     Do you have housing? : Yes     Are you worried about losing your housing?: No        Allergies    Allergies   Allergen Reactions    Folic Acid Unknown       Review of Systems    Pertinent items are noted in HPI.      Physical Exam        3/26/2024    11:02 AM   Oncology Vitals   Height 159 cm   Weight 71.215 kg   BSA (m2) 1.77 m2   /74   Pulse 88   Temp 97.6  F (36.4  C)   Temp src Tympanic   SpO2 100 %   Pain Score 0 (None)       BP (!) 156/74 (BP Location: Left arm,  "Patient Position: Sitting, Cuff Size: Adult Regular)   Pulse 88   Temp 97.6  F (36.4  C) (Tympanic)   Resp 18   Ht 1.588 m (5' 2.5\")   Wt 71.2 kg (157 lb)   SpO2 100%   BMI 28.26 kg/m      General Appearance:    Alert, cooperative, no distress, appears stated age   Head:    Normocephalic, without obvious abnormality, atraumatic   Eyes:    PERRL, conjunctiva/corneas clear, EOM's intact, fundi     benign, both eyes        Ears:    Normal TM's and external ear canals, both ears   Nose:   Nares normal, septum midline, mucosa normal, no drainage    or sinus tenderness   Throat:   Lips, mucosa, and tongue normal; teeth and gums normal   Neck:   Supple, symmetrical, trachea midline, no adenopathy;        thyroid:  No enlargement/tenderness/nodules; no carotid    bruit or JVD   Back:     Symmetric, no curvature, ROM normal, no CVA tenderness   Lungs:     Clear to auscultation bilaterally, respirations unlabored   Chest wall:    No tenderness or deformity   Heart:    Regular rate and rhythm, S1 and S2 normal, no murmur, rub   or gallop   Abdomen:     Soft, non-tender, bowel sounds active all four quadrants,     no masses, no organomegaly           Extremities:   Extremities normal, atraumatic, no cyanosis or edema   Pulses:   2+ and symmetric all extremities   Skin:   Skin color, texture, turgor normal, no rashes or lesions   Lymph nodes:   Cervical, supraclavicular, and axillary nodes normal   Neurologic:   CNII-XII intact. Normal strength, sensation and reflexes       throughout       Lab Results    CBC March 5 white count 7.6 hemoglobin 7.5 MCV 65 platelets 288, 6 years ago white count 9.9 hemoglobin 15.5 MCV 92 platelets 2's 86, CMP normal except AST of 50      Imaging Results    Patient CT chest reviewed.  Previous CTA of the chest abdomen pelvis from 2019 reviewed.      Signed by: Tunde Solis MD  "

## 2024-03-26 NOTE — PROGRESS NOTES
"Oncology Rooming Note    March 26, 2024 11:06 AM   Salas Clinton is a 53 year old male who presents for:    Chief Complaint   Patient presents with    Hematology     New patient consult related to Microcytic anemia     Initial Vitals: BP (!) 156/74 (BP Location: Left arm, Patient Position: Sitting, Cuff Size: Adult Regular)   Pulse 88   Temp 97.6  F (36.4  C) (Tympanic)   Resp 18   Ht 1.588 m (5' 2.5\")   Wt 71.2 kg (157 lb)   SpO2 100%   BMI 28.26 kg/m   Estimated body mass index is 28.26 kg/m  as calculated from the following:    Height as of this encounter: 1.588 m (5' 2.5\").    Weight as of this encounter: 71.2 kg (157 lb). Body surface area is 1.77 meters squared.  No Pain (0) Comment: Data Unavailable   No LMP for male patient.  Allergies reviewed: Yes  Medications reviewed: Yes    Medications: Medication refills not needed today.  Pharmacy name entered into [employer]: [id number] [address] AT [facility] [address]    Frailty Screening:   Is the patient here for a new oncology consult visit in cancer care? 2. No      Clinical concerns: New patient consult related to Microcytic anemia      RENE GANDARA CMA            "

## 2024-03-27 ENCOUNTER — LAB (OUTPATIENT)
Dept: INFUSION THERAPY | Facility: HOSPITAL | Age: 54
End: 2024-03-27
Attending: INTERNAL MEDICINE
Payer: COMMERCIAL

## 2024-03-27 DIAGNOSIS — D50.9 MICROCYTIC ANEMIA: ICD-10-CM

## 2024-03-27 LAB
FERRITIN SERPL-MCNC: 13 NG/ML (ref 31–409)
HEMOCCULT STL QL: NEGATIVE
VIT B12 SERPL-MCNC: 569 PG/ML (ref 232–1245)

## 2024-03-27 PROCEDURE — 82272 OCCULT BLD FECES 1-3 TESTS: CPT

## 2024-04-26 ENCOUNTER — HOSPITAL ENCOUNTER (OUTPATIENT)
Dept: CT IMAGING | Facility: HOSPITAL | Age: 54
Discharge: HOME | End: 2024-04-26
Attending: INTERNAL MEDICINE | Admitting: INTERNAL MEDICINE
Payer: COMMERCIAL

## 2024-04-26 DIAGNOSIS — D50.9 MICROCYTIC ANEMIA: ICD-10-CM

## 2024-04-26 PROCEDURE — 250N000011 HC RX IP 250 OP 636: Performed by: INTERNAL MEDICINE

## 2024-04-26 PROCEDURE — 74177 CT ABD & PELVIS W/CONTRAST: CPT

## 2024-04-26 RX ORDER — IOPAMIDOL 755 MG/ML
77 INJECTION, SOLUTION INTRAVASCULAR ONCE
Status: COMPLETED | OUTPATIENT
Start: 2024-04-26 | End: 2024-04-26

## 2024-04-26 RX ADMIN — IOPAMIDOL 77 ML: 755 INJECTION, SOLUTION INTRAVENOUS at 10:16

## 2024-04-29 ENCOUNTER — ONCOLOGY VISIT (OUTPATIENT)
Dept: ONCOLOGY | Facility: HOSPITAL | Age: 54
End: 2024-04-29
Attending: INTERNAL MEDICINE
Payer: COMMERCIAL

## 2024-04-29 ENCOUNTER — LAB (OUTPATIENT)
Dept: INFUSION THERAPY | Facility: HOSPITAL | Age: 54
End: 2024-04-29
Attending: INTERNAL MEDICINE
Payer: COMMERCIAL

## 2024-04-29 VITALS
OXYGEN SATURATION: 96 % | SYSTOLIC BLOOD PRESSURE: 121 MMHG | RESPIRATION RATE: 16 BRPM | DIASTOLIC BLOOD PRESSURE: 74 MMHG | TEMPERATURE: 97.9 F | WEIGHT: 157 LBS | BODY MASS INDEX: 28.89 KG/M2 | HEART RATE: 69 BPM | HEIGHT: 62 IN

## 2024-04-29 DIAGNOSIS — D50.9 MICROCYTIC ANEMIA: Primary | ICD-10-CM

## 2024-04-29 DIAGNOSIS — D50.9 MICROCYTIC ANEMIA: ICD-10-CM

## 2024-04-29 LAB
BASOPHILS # BLD AUTO: 0.1 10E3/UL (ref 0–0.2)
BASOPHILS NFR BLD AUTO: 1 %
EOSINOPHIL # BLD AUTO: 0.5 10E3/UL (ref 0–0.7)
EOSINOPHIL NFR BLD AUTO: 6 %
ERYTHROCYTE [DISTWIDTH] IN BLOOD BY AUTOMATED COUNT: 29.1 % (ref 10–15)
HCT VFR BLD AUTO: 43.5 % (ref 40–53)
HGB BLD-MCNC: 11.8 G/DL (ref 13.3–17.7)
IMM GRANULOCYTES # BLD: 0 10E3/UL
IMM GRANULOCYTES NFR BLD: 0 %
LYMPHOCYTES # BLD AUTO: 3.3 10E3/UL (ref 0.8–5.3)
LYMPHOCYTES NFR BLD AUTO: 38 %
MCH RBC QN AUTO: 21.2 PG (ref 26.5–33)
MCHC RBC AUTO-ENTMCNC: 27.1 G/DL (ref 31.5–36.5)
MCV RBC AUTO: 78 FL (ref 78–100)
MONOCYTES # BLD AUTO: 1 10E3/UL (ref 0–1.3)
MONOCYTES NFR BLD AUTO: 11 %
NEUTROPHILS # BLD AUTO: 3.9 10E3/UL (ref 1.6–8.3)
NEUTROPHILS NFR BLD AUTO: 44 %
NRBC # BLD AUTO: 0 10E3/UL
NRBC BLD AUTO-RTO: 0 /100
PLATELET # BLD AUTO: 223 10E3/UL (ref 150–450)
RBC # BLD AUTO: 5.56 10E6/UL (ref 4.4–5.9)
WBC # BLD AUTO: 8.8 10E3/UL (ref 4–11)

## 2024-04-29 PROCEDURE — 36415 COLL VENOUS BLD VENIPUNCTURE: CPT

## 2024-04-29 PROCEDURE — G0463 HOSPITAL OUTPT CLINIC VISIT: HCPCS | Performed by: INTERNAL MEDICINE

## 2024-04-29 PROCEDURE — T1013 SIGN LANG/ORAL INTERPRETER: HCPCS | Mod: U3 | Performed by: INTERPRETER

## 2024-04-29 PROCEDURE — 99214 OFFICE O/P EST MOD 30 MIN: CPT | Performed by: INTERNAL MEDICINE

## 2024-04-29 PROCEDURE — 85025 COMPLETE CBC W/AUTO DIFF WBC: CPT

## 2024-04-29 ASSESSMENT — PAIN SCALES - GENERAL: PAINLEVEL: EXTREME PAIN (9)

## 2024-04-29 NOTE — LETTER
"    4/29/2024         RE: Salas Clinton  [address]  [place] [address]        Dear Colleague,    Thank you for referring your patient, Salas Clinton, to the [facility]. Please see a copy of my visit note below.    Oncology Rooming Note    April 29, 2024 9:23 AM   Salas Clinton is a 54 year old male who presents for:    Chief Complaint   Patient presents with     Oncology Clinic Visit     Return visit 4 weeks with lab. CT 0/26/24. Microcytic anemia.     Initial Vitals: /74 (BP Location: Right arm, Patient Position: Sitting, Cuff Size: Adult Regular)   Pulse 69   Temp 97.9  F (36.6  C) (Oral)   Resp 16   Ht 1.581 m (5' 2.25\")   Wt 71.2 kg (157 lb)   SpO2 96%   BMI 28.49 kg/m   Estimated body mass index is 28.49 kg/m  as calculated from the following:    Height as of this encounter: 1.581 m (5' 2.25\").    Weight as of this encounter: 71.2 kg (157 lb). Body surface area is 1.77 meters squared.  Extreme Pain (9) Comment: Data Unavailable   No LMP for male patient.  Allergies reviewed: Yes  Medications reviewed: Yes    Medications: Medication refills not needed today.  Pharmacy name entered into [facility]: [id number] - [facility] [address] AT [facility] [address]    Frailty Screening:   Is the patient here for a new oncology consult visit in cancer care? 2. No      Clinical concerns: low back and bilateral leg pain 9/10.     [facility] Sheree  Jorge FAY present for entire visit.      [facility] Hematology and Oncology Consult Note    Patient: Salas Clinton  MRN: [id number]  Date of Service: 03/26/2024      Reason for Visit    Chief Complaint   Patient presents with     Oncology Clinic Visit     Return visit 4 weeks with lab. CT 0/26/24. Microcytic anemia.       Assessment/Plan    Microcytic anemia, iron deficiency anemia  Anorexia and weight loss    CBC today shows hemoglobin up to 11.8 which " is a significant improvement from just 1 month ago.  Lab work showed low iron saturation and low ferritin consistent with iron deficiency.  Urinalysis and stool check showed no evidence of bleeding.  Other labs including B12, folate and CMP were normal.    CT scan shows no concern for malignancy.      Patient appears to have iron deficiency anemia which could be related to hemorrhoidal bleeding in the past and possibly to some partial malabsorption.    Recommend to continue ferrous sulfate twice daily for another couple of months.  Should have CBC rechecked through his primary care.  May need some additional iron supplementation once daily even after his hemoglobin normalizes.    No further follow-up in oncology.    Plan: Continue ferrous sulfate twice daily for another 2 months  Recheck CBC and iron studies through primary care at that time  May benefit from some additional iron supplementation indefinitely        ECOG Performance    0 - Independent    Problem List    Patient Active Problem List   Diagnosis     Nicotine Dependence     Hemorrhoids     Serum Enzyme Levels - AST (SGOT) Elevated     Accessory Navicular     Gastritis Due To H. Pylori     Bone Cyst     Hypercholesteremia     Gastritis     Insomnia     Alcoholic liver damage (H24)     Polyp of large intestine     Elevated liver enzymes     Oral leukoplakia     Subclinical hypothyroidism     Alcohol abuse     Nodule of upper lobe of left lung     Restless legs syndrome     Gastroesophageal reflux disease, unspecified whether esophagitis present     Obstructive sleep apnea     Nephrolithiasis     Alcohol dependence in remission (H)     Angiodysplasia of colon     Benign neoplasm of ascending colon     Epigastric pain     Hemorrhage of rectum and anus     History of colonic polyps     Nonalcoholic fatty liver disease     Steatosis of liver     Diverticular disease of large intestine     Polyp of colon     Esophagitis     Gastric intestinal metaplasia      Microcytic anemia     ______________________________________________________________________________    Staging History     Cancer Staging   No matching staging information was found for the patient.      History    Patient is here for follow-up.  Reports to be feeling better.  No other new symptoms or concerns.  Has been taking iron twice daily without any problems.  Labs show significant improvement in the hemoglobin up to 11.8.  Workup shows no evidence of bleeding or malignancy.      March 2024:    53-year-old with past history of hyperlipidemia, hypothyroidism, alcoholic liver damage referred for further evaluation of microcytic anemia.  Last hemoglobin was 7.5 with MCV of 65 and 6 years ago he had hemoglobin of 15.5 and MCV of 92.    Has seen GI and had upper endoscopy showing gastric ulcer but without any bleeding.  Also had colonoscopy which did not show any active bleeding either.    Patient reports that for 3 to 4 years he had significant hemorrhoidal bleeding with each bowel movement but this has resolved over the past year.  Currently denies any blood in the stool or urine or dark stool or urine.  No urinary symptoms and normal bowel movements.  Does have some intermittent achy abdominal pain which is exacerbated by certain foods.  No nausea or vomiting.  Decreased appetite and 30 pound weight loss over the past year.    Describes poor sleep due to achiness in the legs and back.  No previous blood transfusion, oral or IV iron therapy.    No abdominal surgery.    Past medical history reviewed.  Past surgeries include: On the right leg in the forehead area.  He has not worked for the last couple years.  Smokes 3 cigarettes a day.  No alcohol currently.  No personal or family history of cancer.    Past History    Past Medical History:   Diagnosis Date     GERD (gastroesophageal reflux disease)      Hematochezia      Hepatitis     ETOH     Hyperlipidemia      Lower Back Pain     Created by Conversion       Seizure (H)     Family History   Problem Relation Age of Onset     Snoring Father       Past Surgical History:   Procedure Laterality Date     ESOPHAGOSCOPY, GASTROSCOPY, DUODENOSCOPY (EGD), COMBINED N/A 1/5/2023    Procedure: ESOPHAGOGASTRODUODENOSCOPY, WITH BIOPSY;  Surgeon: Susana Bolaños MD;  Location:  GI     ESOPHAGOSCOPY, GASTROSCOPY, DUODENOSCOPY (EGD), COMBINED N/A 8/24/2023    Procedure: ESOPHAGOGASTRODUODENOSCOPY, WITH BIOPSY;  Surgeon: Susana Bolaños MD;  Location:  GI     FOOT SURGERY Right     Social History     Socioeconomic History     Marital status:      Spouse name: Not on file     Number of children: Not on file     Years of education: Not on file     Highest education level: Not on file   Occupational History     Not on file   Tobacco Use     Smoking status: Every Day     Types: Cigarettes     Passive exposure: Past     Smokeless tobacco: Former     Types: Chew     Tobacco comments:     Smoking 3 cigarettes daily and chewing Betelnut without Tobacco **as of March 2024**   Vaping Use     Vaping status: Never Used   Substance and Sexual Activity     Alcohol use: No     Comment: Alcoholic Drinks/day: no longer drinking since 4/1/2017     Drug use: No     Sexual activity: Yes     Partners: Female   Other Topics Concern     Parent/sibling w/ CABG, MI or angioplasty before 65F 55M? Not Asked   Social History Narrative     Not on file     Social Determinants of Health     Financial Resource Strain: Low Risk  (3/5/2024)    Financial Resource Strain      Within the past 12 months, have you or your family members you live with been unable to get utilities (heat, electricity) when it was really needed?: No   Food Insecurity: Low Risk  (3/5/2024)    Food Insecurity      Within the past 12 months, did you worry that your food would run out before you got money to buy more?: No      Within the past 12 months, did the food you bought just not last and you didn t have  money to get more?: No   Transportation Needs: Low Risk  (3/5/2024)    Transportation Needs      Within the past 12 months, has lack of transportation kept you from medical appointments, getting your medicines, non-medical meetings or appointments, work, or from getting things that you need?: No   Physical Activity: Unknown (3/5/2024)    Exercise Vital Sign      Days of Exercise per Week: 7 days      Minutes of Exercise per Session: Not on file   Stress: No Stress Concern Present (3/5/2024)    [employer] - Occupational Stress Questionnaire      Feeling of Stress : Only a little   Social Connections: Unknown (3/5/2024)    Social Connection and Isolation Panel [NHANES]      Frequency of Communication with Friends and Family: Not on file      Frequency of Social Gatherings with Friends and Family: Once a week      Attends Buddhism Services: Not on file      Active Member of Clubs or Organizations: Not on file      Attends Club or Organization Meetings: Not on file      Marital Status: Not on file   Interpersonal Safety: Low Risk  (3/5/2024)    Interpersonal Safety      Do you feel physically and emotionally safe where you currently live?: Yes      Within the past 12 months, have you been hit, slapped, kicked or otherwise physically hurt by someone?: No      Within the past 12 months, have you been humiliated or emotionally abused in other ways by your partner or ex-partner?: No   Housing Stability: Low Risk  (3/5/2024)    Housing Stability      Do you have housing? : Yes      Are you worried about losing your housing?: No        Allergies    Allergies   Allergen Reactions     Folic Acid Unknown       Review of Systems    Pertinent items are noted in HPI.      Physical Exam        4/29/2024     9:19 AM   Oncology Vitals   Height 158 cm   Weight 71.215 kg   BSA (m2) 1.77 m2   /74   Pulse 69   Temp 97.9  F (36.6  C)   Temp src Oral   SpO2 96 %   Pain Score 9 (Extreme)   Pain Loc LOW  "BACK       /74 (BP Location: Right arm, Patient Position: Sitting, Cuff Size: Adult Regular)   Pulse 69   Temp 97.9  F (36.6  C) (Oral)   Resp 16   Ht 1.581 m (5' 2.25\")   Wt 71.2 kg (157 lb)   SpO2 96%   BMI 28.49 kg/m      General Appearance:    Alert, cooperative, no distress, appears stated age   Head:    Normocephalic, without obvious abnormality, atraumatic   Eyes:    PERRL, conjunctiva/corneas clear, EOM's intact, fundi     benign, both eyes        Ears:    Normal TM's and external ear canals, both ears   Nose:   Nares normal, septum midline, mucosa normal, no drainage    or sinus tenderness   Throat:   Lips, mucosa, and tongue normal; teeth and gums normal   Neck:   Supple, symmetrical, trachea midline, no adenopathy;        thyroid:  No enlargement/tenderness/nodules; no carotid    bruit or JVD   Back:     Symmetric, no curvature, ROM normal, no CVA tenderness   Lungs:     Clear to auscultation bilaterally, respirations unlabored   Chest wall:    No tenderness or deformity   Heart:    Regular rate and rhythm, S1 and S2 normal, no murmur, rub   or gallop   Abdomen:     Soft, non-tender, bowel sounds active all four quadrants,     no masses, no organomegaly           Extremities:   Extremities normal, atraumatic, no cyanosis or edema   Pulses:   2+ and symmetric all extremities   Skin:   Skin color, texture, turgor normal, no rashes or lesions   Lymph nodes:   Cervical, supraclavicular, and axillary nodes normal   Neurologic:   CNII-XII intact. Normal strength, sensation and reflexes       throughout       Lab Results    CBC March 5 white count 7.6 hemoglobin 7.5 MCV 65 platelets 288, 6 years ago white count 9.9 hemoglobin 15.5 MCV 92 platelets 2's 86, CMP normal except AST of 50      Imaging Results    Patient CT chest reviewed.  Previous CTA of the chest abdomen pelvis from 2019 reviewed.      Signed by: Tunde Solis MD      Again, thank you for allowing me to participate in the care " of your patient.        Sincerely,        Tunde Solis MD

## 2024-04-29 NOTE — PROGRESS NOTES
[facility] Hematology and Oncology Consult Note    Patient: Salas Clinton  MRN: [id number]  Date of Service: 03/26/2024      Reason for Visit    Chief Complaint   Patient presents with    Oncology Clinic Visit     Return visit 4 weeks with lab. CT 0/26/24. Microcytic anemia.       Assessment/Plan    Microcytic anemia, iron deficiency anemia  Anorexia and weight loss    CBC today shows hemoglobin up to 11.8 which is a significant improvement from just 1 month ago.  Lab work showed low iron saturation and low ferritin consistent with iron deficiency.  Urinalysis and stool check showed no evidence of bleeding.  Other labs including B12, folate and CMP were normal.    CT scan shows no concern for malignancy.      Patient appears to have iron deficiency anemia which could be related to hemorrhoidal bleeding in the past and possibly to some partial malabsorption.    Recommend to continue ferrous sulfate twice daily for another couple of months.  Should have CBC rechecked through his primary care.  May need some additional iron supplementation once daily even after his hemoglobin normalizes.    No further follow-up in oncology.    Plan: Continue ferrous sulfate twice daily for another 2 months  Recheck CBC and iron studies through primary care at that time  May benefit from some additional iron supplementation indefinitely        ECOG Performance    0 - Independent    Problem List    Patient Active Problem List   Diagnosis    Nicotine Dependence    Hemorrhoids    Serum Enzyme Levels - AST (SGOT) Elevated    Accessory Navicular    Gastritis Due To H. Pylori    Bone Cyst    Hypercholesteremia    Gastritis    Insomnia    Alcoholic liver damage (H24)    Polyp of large intestine    Elevated liver enzymes    Oral leukoplakia    Subclinical hypothyroidism    Alcohol abuse    Nodule of upper lobe of left lung    Restless legs syndrome    Gastroesophageal reflux disease, unspecified whether esophagitis present    Obstructive  sleep apnea    Nephrolithiasis    Alcohol dependence in remission (H)    Angiodysplasia of colon    Benign neoplasm of ascending colon    Epigastric pain    Hemorrhage of rectum and anus    History of colonic polyps    Nonalcoholic fatty liver disease    Steatosis of liver    Diverticular disease of large intestine    Polyp of colon    Esophagitis    Gastric intestinal metaplasia    Microcytic anemia     ______________________________________________________________________________    Staging History     Cancer Staging   No matching staging information was found for the patient.      History    Patient is here for follow-up.  Reports to be feeling better.  No other new symptoms or concerns.  Has been taking iron twice daily without any problems.  Labs show significant improvement in the hemoglobin up to 11.8.  Workup shows no evidence of bleeding or malignancy.      March 2024:    53-year-old with past history of hyperlipidemia, hypothyroidism, alcoholic liver damage referred for further evaluation of microcytic anemia.  Last hemoglobin was 7.5 with MCV of 65 and 6 years ago he had hemoglobin of 15.5 and MCV of 92.    Has seen GI and had upper endoscopy showing gastric ulcer but without any bleeding.  Also had colonoscopy which did not show any active bleeding either.    Patient reports that for 3 to 4 years he had significant hemorrhoidal bleeding with each bowel movement but this has resolved over the past year.  Currently denies any blood in the stool or urine or dark stool or urine.  No urinary symptoms and normal bowel movements.  Does have some intermittent achy abdominal pain which is exacerbated by certain foods.  No nausea or vomiting.  Decreased appetite and 30 pound weight loss over the past year.    Describes poor sleep due to achiness in the legs and back.  No previous blood transfusion, oral or IV iron therapy.    No abdominal surgery.    Past medical history reviewed.  Past surgeries include: On the  right leg in the forehead area.  He has not worked for the last couple years.  Smokes 3 cigarettes a day.  No alcohol currently.  No personal or family history of cancer.    Past History    Past Medical History:   Diagnosis Date    GERD (gastroesophageal reflux disease)     Hematochezia     Hepatitis     ETOH    Hyperlipidemia     Lower Back Pain     Created by Conversion     Seizure (H)     Family History   Problem Relation Age of Onset    Snoring Father       Past Surgical History:   Procedure Laterality Date    ESOPHAGOSCOPY, GASTROSCOPY, DUODENOSCOPY (EGD), COMBINED N/A 1/5/2023    Procedure: ESOPHAGOGASTRODUODENOSCOPY, WITH BIOPSY;  Surgeon: Susana Bolaños MD;  Location:  GI    ESOPHAGOSCOPY, GASTROSCOPY, DUODENOSCOPY (EGD), COMBINED N/A 8/24/2023    Procedure: ESOPHAGOGASTRODUODENOSCOPY, WITH BIOPSY;  Surgeon: Susana Bolaños MD;  Location:  GI    FOOT SURGERY Right     Social History     Socioeconomic History    Marital status:      Spouse name: Not on file    Number of children: Not on file    Years of education: Not on file    Highest education level: Not on file   Occupational History    Not on file   Tobacco Use    Smoking status: Every Day     Types: Cigarettes     Passive exposure: Past    Smokeless tobacco: Former     Types: Chew    Tobacco comments:     Smoking 3 cigarettes daily and chewing Betelnut without Tobacco **as of March 2024**   Vaping Use    Vaping status: Never Used   Substance and Sexual Activity    Alcohol use: No     Comment: Alcoholic Drinks/day: no longer drinking since 4/1/2017    Drug use: No    Sexual activity: Yes     Partners: Female   Other Topics Concern    Parent/sibling w/ CABG, MI or angioplasty before 65F 55M? Not Asked   Social History Narrative    Not on file     Social Determinants of Health     Financial Resource Strain: Low Risk  (3/5/2024)    Financial Resource Strain     Within the past 12 months, have you or your family members  you live with been unable to get utilities (heat, electricity) when it was really needed?: No   Food Insecurity: Low Risk  (3/5/2024)    Food Insecurity     Within the past 12 months, did you worry that your food would run out before you got money to buy more?: No     Within the past 12 months, did the food you bought just not last and you didn t have money to get more?: No   Transportation Needs: Low Risk  (3/5/2024)    Transportation Needs     Within the past 12 months, has lack of transportation kept you from medical appointments, getting your medicines, non-medical meetings or appointments, work, or from getting things that you need?: No   Physical Activity: Unknown (3/5/2024)    Exercise Vital Sign     Days of Exercise per Week: 7 days     Minutes of Exercise per Session: Not on file   Stress: No Stress Concern Present (3/5/2024)    [employer] - Occupational Stress Questionnaire     Feeling of Stress : Only a little   Social Connections: Unknown (3/5/2024)    Social Connection and Isolation Panel [NHANES]     Frequency of Communication with Friends and Family: Not on file     Frequency of Social Gatherings with Friends and Family: Once a week     Attends Mormon Services: Not on file     Active Member of Clubs or Organizations: Not on file     Attends Club or Organization Meetings: Not on file     Marital Status: Not on file   Interpersonal Safety: Low Risk  (3/5/2024)    Interpersonal Safety     Do you feel physically and emotionally safe where you currently live?: Yes     Within the past 12 months, have you been hit, slapped, kicked or otherwise physically hurt by someone?: No     Within the past 12 months, have you been humiliated or emotionally abused in other ways by your partner or ex-partner?: No   Housing Stability: Low Risk  (3/5/2024)    Housing Stability     Do you have housing? : Yes     Are you worried about losing your housing?: No        Allergies    Allergies  "  Allergen Reactions    Folic Acid Unknown       Review of Systems    Pertinent items are noted in HPI.      Physical Exam        4/29/2024     9:19 AM   Oncology Vitals   Height 158 cm   Weight 71.215 kg   BSA (m2) 1.77 m2   /74   Pulse 69   Temp 97.9  F (36.6  C)   Temp src Oral   SpO2 96 %   Pain Score 9 (Extreme)   Pain Loc LOW BACK       /74 (BP Location: Right arm, Patient Position: Sitting, Cuff Size: Adult Regular)   Pulse 69   Temp 97.9  F (36.6  C) (Oral)   Resp 16   Ht 1.581 m (5' 2.25\")   Wt 71.2 kg (157 lb)   SpO2 96%   BMI 28.49 kg/m      General Appearance:    Alert, cooperative, no distress, appears stated age   Head:    Normocephalic, without obvious abnormality, atraumatic   Eyes:    PERRL, conjunctiva/corneas clear, EOM's intact, fundi     benign, both eyes        Ears:    Normal TM's and external ear canals, both ears   Nose:   Nares normal, septum midline, mucosa normal, no drainage    or sinus tenderness   Throat:   Lips, mucosa, and tongue normal; teeth and gums normal   Neck:   Supple, symmetrical, trachea midline, no adenopathy;        thyroid:  No enlargement/tenderness/nodules; no carotid    bruit or JVD   Back:     Symmetric, no curvature, ROM normal, no CVA tenderness   Lungs:     Clear to auscultation bilaterally, respirations unlabored   Chest wall:    No tenderness or deformity   Heart:    Regular rate and rhythm, S1 and S2 normal, no murmur, rub   or gallop   Abdomen:     Soft, non-tender, bowel sounds active all four quadrants,     no masses, no organomegaly           Extremities:   Extremities normal, atraumatic, no cyanosis or edema   Pulses:   2+ and symmetric all extremities   Skin:   Skin color, texture, turgor normal, no rashes or lesions   Lymph nodes:   Cervical, supraclavicular, and axillary nodes normal   Neurologic:   CNII-XII intact. Normal strength, sensation and reflexes       throughout       Lab Results    CBC March 5 white count 7.6 hemoglobin 7.5 " MCV 65 platelets 288, 6 years ago white count 9.9 hemoglobin 15.5 MCV 92 platelets 2's 86, CMP normal except AST of 50      Imaging Results    Patient CT chest reviewed.  Previous CTA of the chest abdomen pelvis from 2019 reviewed.      Signed by: Tunde Solis MD

## 2024-04-29 NOTE — PROGRESS NOTES
"Oncology Rooming Note    April 29, 2024 9:23 AM   Salas Clinton is a 54 year old male who presents for:    Chief Complaint   Patient presents with    Oncology Clinic Visit     Return visit 4 weeks with lab. CT 0/26/24. Microcytic anemia.     Initial Vitals: /74 (BP Location: Right arm, Patient Position: Sitting, Cuff Size: Adult Regular)   Pulse 69   Temp 97.9  F (36.6  C) (Oral)   Resp 16   Ht 1.581 m (5' 2.25\")   Wt 71.2 kg (157 lb)   SpO2 96%   BMI 28.49 kg/m   Estimated body mass index is 28.49 kg/m  as calculated from the following:    Height as of this encounter: 1.581 m (5' 2.25\").    Weight as of this encounter: 71.2 kg (157 lb). Body surface area is 1.77 meters squared.  Extreme Pain (9) Comment: Data Unavailable   No LMP for male patient.  Allergies reviewed: Yes  Medications reviewed: Yes    Medications: Medication refills not needed today.  Pharmacy name entered into [employer]: [facility][id number] [place] [id number] AT [facility] [address]    Frailty Screening:   Is the patient here for a new oncology consult visit in cancer care? 2. No      Clinical concerns: low back and bilateral leg pain 9/10.     [facility] Sehree  Jorge FAY present for entire visit.      [facility] [address]            "

## 2024-05-06 DIAGNOSIS — K25.9 GASTRIC ULCER WITHOUT HEMORRHAGE OR PERFORATION, UNSPECIFIED CHRONICITY: ICD-10-CM

## 2024-05-10 NOTE — PROGRESS NOTES
THORACIC SURGERY FOLLOW UP VISIT    With the assistance of a Sheree  via iPad, I saw Mr. Salas Clinton in follow-up today. The clinical summary follows:     CHIEF COMPLAINT  Lung nodules  INTERVAL STUDIES  CT chest 05/13/2024: final report pending at time of clinic visit. To my review, the left upper lobe lung nodule appears stable with some central calcification.    Past Medical History:   Diagnosis Date    GERD (gastroesophageal reflux disease)     Hematochezia     Hepatitis     ETOH    Hyperlipidemia     Lower Back Pain     Created by Conversion     Seizure (H)       Past Surgical History:   Procedure Laterality Date    ESOPHAGOSCOPY, GASTROSCOPY, DUODENOSCOPY (EGD), COMBINED N/A 1/5/2023    Procedure: ESOPHAGOGASTRODUODENOSCOPY, WITH BIOPSY;  Surgeon: Susana Bolaños MD;  Location:  GI    ESOPHAGOSCOPY, GASTROSCOPY, DUODENOSCOPY (EGD), COMBINED N/A 8/24/2023    Procedure: ESOPHAGOGASTRODUODENOSCOPY, WITH BIOPSY;  Surgeon: Susana Bolaños MD;  Location:  GI    FOOT SURGERY Right       Social History     Socioeconomic History    Marital status:      Spouse name: Not on file    Number of children: Not on file    Years of education: Not on file    Highest education level: Not on file   Occupational History    Not on file   Tobacco Use    Smoking status: Every Day     Types: Cigarettes     Passive exposure: Past    Smokeless tobacco: Former     Types: Chew    Tobacco comments:     Smoking 3 cigarettes daily and chewing Betelnut without Tobacco **as of March 2024**   Vaping Use    Vaping status: Never Used   Substance and Sexual Activity    Alcohol use: No     Comment: Alcoholic Drinks/day: no longer drinking since 4/1/2017    Drug use: No    Sexual activity: Yes     Partners: Female   Other Topics Concern    Parent/sibling w/ CABG, MI or angioplasty before 65F 55M? Not Asked   Social History Narrative    Not on file     Social Determinants of Health     Financial Resource  Strain: Low Risk  (3/5/2024)    Financial Resource Strain     Within the past 12 months, have you or your family members you live with been unable to get utilities (heat, electricity) when it was really needed?: No   Food Insecurity: Low Risk  (3/5/2024)    Food Insecurity     Within the past 12 months, did you worry that your food would run out before you got money to buy more?: No     Within the past 12 months, did the food you bought just not last and you didn t have money to get more?: No   Transportation Needs: Low Risk  (3/5/2024)    Transportation Needs     Within the past 12 months, has lack of transportation kept you from medical appointments, getting your medicines, non-medical meetings or appointments, work, or from getting things that you need?: No   Physical Activity: Unknown (3/5/2024)    Exercise Vital Sign     Days of Exercise per Week: 7 days     Minutes of Exercise per Session: Not on file   Stress: No Stress Concern Present (3/5/2024)    [employer] - Occupational Stress Questionnaire     Feeling of Stress : Only a little   Social Connections: Unknown (3/5/2024)    Social Connection and Isolation Panel [NHANES]     Frequency of Communication with Friends and Family: Not on file     Frequency of Social Gatherings with Friends and Family: Once a week     Attends Sabianist Services: Not on file     Active Member of Clubs or Organizations: Not on file     Attends Club or Organization Meetings: Not on file     Marital Status: Not on file   Interpersonal Safety: Low Risk  (3/5/2024)    Interpersonal Safety     Do you feel physically and emotionally safe where you currently live?: Yes     Within the past 12 months, have you been hit, slapped, kicked or otherwise physically hurt by someone?: No     Within the past 12 months, have you been humiliated or emotionally abused in other ways by your partner or ex-partner?: No   Housing Stability: Low Risk  (3/5/2024)    Housing  Stability     Do you have housing? : Yes     Are you worried about losing your housing?: No      SUBJECTIVE  Salas is doing well. He denies recent illness, cough or shortness of breath.    OBJECTIVE  /86 (BP Location: Right arm, Patient Position: Sitting, Cuff Size: Adult Regular)   Pulse 71   Temp 98.1  F (36.7  C) (Oral)   Resp 16   Wt 72 kg (158 lb 11.2 oz)   SpO2 98%   BMI 28.79 kg/m       From a personal perspective, Salas is here with his son, Yaniv.    IMPRESSION  Salas is a 54 year-old male with lung nodules. These were first identified on a CTA CT in June 2019. Follow up chest CT in January 2022 demonstrated slight increase in the size of a left upper lobe lung nodule that has been stable on subsequent chest CTs. He is here today with a new chest CT for ongoing lung nodule follow up.    I reviewed the CT images with Salas and Yaniv. I will await the final radiology report however, it appears to me that the nodule is stable. I recommend follow up with another chest CT in 1 year.    PLAN  I spent 15 min on the date of the encounter in chart review, patient visit, review of tests, documentation and/or discussion with other providers about the issues documented above. I reviewed the plan as follows:  Follow up with Interventional Pulmonology in 1 year with a chest CT prior  Necessary Tests & Appointments: chest CT  All questions were answered and the patient and present family were in agreement with the plan.  I appreciate the opportunity to participate in the care of your patient and will keep you updated.  Sincerely,

## 2024-05-13 ENCOUNTER — ONCOLOGY VISIT (OUTPATIENT)
Dept: SURGERY | Facility: CLINIC | Age: 54
End: 2024-05-13
Attending: CLINICAL NURSE SPECIALIST
Payer: COMMERCIAL

## 2024-05-13 ENCOUNTER — ANCILLARY PROCEDURE (OUTPATIENT)
Dept: CT IMAGING | Facility: CLINIC | Age: 54
End: 2024-05-13
Attending: INTERNAL MEDICINE
Payer: COMMERCIAL

## 2024-05-13 VITALS
SYSTOLIC BLOOD PRESSURE: 132 MMHG | BODY MASS INDEX: 28.79 KG/M2 | OXYGEN SATURATION: 98 % | WEIGHT: 158.7 LBS | DIASTOLIC BLOOD PRESSURE: 86 MMHG | RESPIRATION RATE: 16 BRPM | TEMPERATURE: 98.1 F | HEART RATE: 71 BPM

## 2024-05-13 DIAGNOSIS — R91.1 NODULE OF LEFT LUNG: ICD-10-CM

## 2024-05-13 DIAGNOSIS — R91.8 LUNG NODULES: Primary | ICD-10-CM

## 2024-05-13 PROCEDURE — 99202 OFFICE O/P NEW SF 15 MIN: CPT | Performed by: CLINICAL NURSE SPECIALIST

## 2024-05-13 PROCEDURE — G0463 HOSPITAL OUTPT CLINIC VISIT: HCPCS | Performed by: CLINICAL NURSE SPECIALIST

## 2024-05-13 PROCEDURE — 71250 CT THORAX DX C-: CPT | Performed by: RADIOLOGY

## 2024-05-13 ASSESSMENT — PAIN SCALES - GENERAL: PAINLEVEL: NO PAIN (0)

## 2024-05-13 NOTE — LETTER
5/13/2024         RE: Salas Clinton  [address]  [place] [address]        Dear Colleague,    Thank you for referring your patient, Salas Clinotn, to the [facility]. Please see a copy of my visit note below.    THORACIC SURGERY FOLLOW UP VISIT    With the assistance of a Sheree  via iPad, I saw Mr. Salas Clinton in follow-up today. The clinical summary follows:     CHIEF COMPLAINT  Lung nodules  INTERVAL STUDIES  CT chest 05/13/2024: final report pending at time of clinic visit. To my review, the left upper lobe lung nodule appears stable with some central calcification.    Past Medical History:   Diagnosis Date    GERD (gastroesophageal reflux disease)     Hematochezia     Hepatitis     ETOH    Hyperlipidemia     Lower Back Pain     Created by Conversion     Seizure (H)       Past Surgical History:   Procedure Laterality Date    ESOPHAGOSCOPY, GASTROSCOPY, DUODENOSCOPY (EGD), COMBINED N/A 1/5/2023    Procedure: ESOPHAGOGASTRODUODENOSCOPY, WITH BIOPSY;  Surgeon: Susana Bolaños MD;  Location:  GI    ESOPHAGOSCOPY, GASTROSCOPY, DUODENOSCOPY (EGD), COMBINED N/A 8/24/2023    Procedure: ESOPHAGOGASTRODUODENOSCOPY, WITH BIOPSY;  Surgeon: Susana Bolaños MD;  Location:  GI    FOOT SURGERY Right       Social History     Socioeconomic History    Marital status:      Spouse name: Not on file    Number of children: Not on file    Years of education: Not on file    Highest education level: Not on file   Occupational History    Not on file   Tobacco Use    Smoking status: Every Day     Types: Cigarettes     Passive exposure: Past    Smokeless tobacco: Former     Types: Chew    Tobacco comments:     Smoking 3 cigarettes daily and chewing Betelnut without Tobacco **as of March 2024**   Vaping Use    Vaping status: Never Used   Substance and Sexual Activity    Alcohol use: No     Comment: Alcoholic Drinks/day: no longer drinking since 4/1/2017    Drug use:  No    Sexual activity: Yes     Partners: Female   Other Topics Concern    Parent/sibling w/ CABG, MI or angioplasty before 65F 55M? Not Asked   Social History Narrative    Not on file     Social Determinants of Health     Financial Resource Strain: Low Risk  (3/5/2024)    Financial Resource Strain     Within the past 12 months, have you or your family members you live with been unable to get utilities (heat, electricity) when it was really needed?: No   Food Insecurity: Low Risk  (3/5/2024)    Food Insecurity     Within the past 12 months, did you worry that your food would run out before you got money to buy more?: No     Within the past 12 months, did the food you bought just not last and you didn t have money to get more?: No   Transportation Needs: Low Risk  (3/5/2024)    Transportation Needs     Within the past 12 months, has lack of transportation kept you from medical appointments, getting your medicines, non-medical meetings or appointments, work, or from getting things that you need?: No   Physical Activity: Unknown (3/5/2024)    Exercise Vital Sign     Days of Exercise per Week: 7 days     Minutes of Exercise per Session: Not on file   Stress: No Stress Concern Present (3/5/2024)    [employer] - Occupational Stress Questionnaire     Feeling of Stress : Only a little   Social Connections: Unknown (3/5/2024)    Social Connection and Isolation Panel [NHANES]     Frequency of Communication with Friends and Family: Not on file     Frequency of Social Gatherings with Friends and Family: Once a week     Attends Christianity Services: Not on file     Active Member of Clubs or Organizations: Not on file     Attends Club or Organization Meetings: Not on file     Marital Status: Not on file   Interpersonal Safety: Low Risk  (3/5/2024)    Interpersonal Safety     Do you feel physically and emotionally safe where you currently live?: Yes     Within the past 12 months, have you been hit,  slapped, kicked or otherwise physically hurt by someone?: No     Within the past 12 months, have you been humiliated or emotionally abused in other ways by your partner or ex-partner?: No   Housing Stability: Low Risk  (3/5/2024)    Housing Stability     Do you have housing? : Yes     Are you worried about losing your housing?: No      SUBJECTIVE  Salas is doing well. He denies recent illness, cough or shortness of breath.    OBJECTIVE  /86 (BP Location: Right arm, Patient Position: Sitting, Cuff Size: Adult Regular)   Pulse 71   Temp 98.1  F (36.7  C) (Oral)   Resp 16   Wt 72 kg (158 lb 11.2 oz)   SpO2 98%   BMI 28.79 kg/m       From a personal perspective, Salas is here with his son, Yaniv.    IMPRESSION  Salas is a 54 year-old male with lung nodules. These were first identified on a CTA CT in June 2019. Follow up chest CT in January 2022 demonstrated slight increase in the size of a left upper lobe lung nodule that has been stable on subsequent chest CTs. He is here today with a new chest CT for ongoing lung nodule follow up.    I reviewed the CT images with Salas and Yaniv. I will await the final radiology report however, it appears to me that the nodule is stable. I recommend follow up with another chest CT in 1 year.    PLAN  I spent 15 min on the date of the encounter in chart review, patient visit, review of tests, documentation and/or discussion with other providers about the issues documented above. I reviewed the plan as follows:  Follow up with Interventional Pulmonology in 1 year with a chest CT prior  Necessary Tests & Appointments: chest CT  All questions were answered and the patient and present family were in agreement with the plan.  I appreciate the opportunity to participate in the care of your patient and will keep you updated.  Sincerely,      ERICA Ann CNS

## 2024-05-13 NOTE — NURSING NOTE
"Oncology Rooming Note    May 13, 2024 11:49 AM   Salas Clinton is a 54 year old male who presents for:    Chief Complaint   Patient presents with    Oncology Clinic Visit     Pulmonary nodules     Initial Vitals: /86 (BP Location: Right arm, Patient Position: Sitting, Cuff Size: Adult Regular)   Pulse 71   Temp 98.1  F (36.7  C) (Oral)   Resp 16   Wt 72 kg (158 lb 11.2 oz)   SpO2 98%   BMI 28.79 kg/m   Estimated body mass index is 28.79 kg/m  as calculated from the following:    Height as of 4/29/24: 1.581 m (5' 2.25\").    Weight as of this encounter: 72 kg (158 lb 11.2 oz). Body surface area is 1.78 meters squared.  No Pain (0) Comment: Data Unavailable   No LMP for male patient.  Allergies reviewed: Yes  Medications reviewed: Yes    Medications: Medication refills not needed today.  Pharmacy name entered into [facility]: [id number] [address] AT [facility] [address]    Frailty Screening:   Is the patient here for a new oncology consult visit in cancer care? 2. No      Clinical concerns: none       Mariela Whitman              "

## 2024-05-20 DIAGNOSIS — K25.9 GASTRIC ULCER WITHOUT HEMORRHAGE OR PERFORATION, UNSPECIFIED CHRONICITY: ICD-10-CM

## 2025-01-22 DIAGNOSIS — R91.8 LUNG NODULES: Primary | ICD-10-CM

## 2025-02-03 ENCOUNTER — PATIENT OUTREACH (OUTPATIENT)
Dept: CARE COORDINATION | Facility: CLINIC | Age: 55
End: 2025-02-03
Payer: COMMERCIAL

## 2025-04-05 ENCOUNTER — HEALTH MAINTENANCE LETTER (OUTPATIENT)
Age: 55
End: 2025-04-05

## 2025-05-22 ENCOUNTER — DOCUMENTATION ONLY (OUTPATIENT)
Dept: PULMONOLOGY | Facility: CLINIC | Age: 55
End: 2025-05-22
Payer: COMMERCIAL

## 2025-05-23 NOTE — NURSING NOTE
Pre-visit planning and chart review completed.     RETURN patient appointment:    6/11 with Dr. Lloyd Goode  6/11 CT chest wo contrast    - 12 month follow-up .    CE updated. Medications, allergies, problem list, and immunizations reconciled.

## 2025-06-11 ENCOUNTER — ONCOLOGY VISIT (OUTPATIENT)
Dept: PULMONOLOGY | Facility: CLINIC | Age: 55
End: 2025-06-11
Attending: INTERNAL MEDICINE
Payer: COMMERCIAL

## 2025-06-11 ENCOUNTER — ANCILLARY PROCEDURE (OUTPATIENT)
Dept: CT IMAGING | Facility: CLINIC | Age: 55
End: 2025-06-11
Attending: CLINICAL NURSE SPECIALIST
Payer: COMMERCIAL

## 2025-06-11 VITALS
BODY MASS INDEX: 28.9 KG/M2 | WEIGHT: 159.3 LBS | DIASTOLIC BLOOD PRESSURE: 97 MMHG | RESPIRATION RATE: 16 BRPM | SYSTOLIC BLOOD PRESSURE: 152 MMHG | OXYGEN SATURATION: 99 % | HEART RATE: 66 BPM | TEMPERATURE: 98.1 F

## 2025-06-11 DIAGNOSIS — R91.8 PULMONARY NODULES: Primary | ICD-10-CM

## 2025-06-11 DIAGNOSIS — R91.8 LUNG NODULES: ICD-10-CM

## 2025-06-11 PROCEDURE — T1013 SIGN LANG/ORAL INTERPRETER: HCPCS | Mod: U4

## 2025-06-11 PROCEDURE — G0463 HOSPITAL OUTPT CLINIC VISIT: HCPCS | Performed by: INTERNAL MEDICINE

## 2025-06-11 PROCEDURE — 71250 CT THORAX DX C-: CPT | Performed by: RADIOLOGY

## 2025-06-11 PROCEDURE — 99215 OFFICE O/P EST HI 40 MIN: CPT | Performed by: INTERNAL MEDICINE

## 2025-06-11 PROCEDURE — G2211 COMPLEX E/M VISIT ADD ON: HCPCS | Performed by: INTERNAL MEDICINE

## 2025-06-11 ASSESSMENT — PAIN SCALES - GENERAL: PAINLEVEL_OUTOF10: NO PAIN (0)

## 2025-06-11 NOTE — NURSING NOTE
"Oncology Rooming Note    June 11, 2025 8:38 AM   Salas Clinton is a 55 year old male who presents for:    Chief Complaint   Patient presents with    Oncology Clinic Visit     Lung nodules     Initial Vitals: BP (!) 152/97 (BP Location: Right arm, Patient Position: Sitting, Cuff Size: Adult Regular)   Pulse 66   Temp 98.1  F (36.7  C) (Oral)   Resp 16   Wt 72.3 kg (159 lb 4.8 oz)   SpO2 99%   BMI 28.90 kg/m   Estimated body mass index is 28.9 kg/m  as calculated from the following:    Height as of 4/29/24: 1.581 m (5' 2.25\").    Weight as of this encounter: 72.3 kg (159 lb 4.8 oz). Body surface area is 1.78 meters squared.  No Pain (0) Comment: Data Unavailable   No LMP for male patient.  Allergies reviewed: Yes  Medications reviewed: Yes    Medications: Medication refills not needed today.  Pharm[facility] into [facility]: [id number] [address] AT [facility] [address]    Frailty Screening:   Is the patient here for a new oncology consult visit in cancer care? 2. No    PHQ9:  Did this patient require a PHQ9?: No      Clinical concerns: Pt wondering if he is having any improvement - was previously told his condition is not worsening, but also not getting better.      Tarun Acosta              "

## 2025-06-11 NOTE — CONFIDENTIAL NOTE
LUNG NODULE & INTERVENTIONAL PULMONARY CLINIC  CLINICS [facility] SURGERY [facility]ENTER, [facility], [facility]  [address] [place] [id number]  Phone: [phone]  Fax: [phone]    Patient:  Salas Clinton, Age 55 year old, Date of birth 1970, MRN# [id number]  Date of Visit:  06/11/2025  Referring Provider Referred Self    Reason for Consultation: Lung Nodule    Assessment and Plan:    1.Established lung nodule(s). No interval change in last 12mo. No further surveillance necessary for these. He does qualify for LDCT and will enroll. I'll see him back in 12mo     2. Tobacco use. Trying to quit on his own    Billing: I spent a total of 45min spent on date of encounter which includes prep time, visit with the patient and post visit work including documentation and nursing communication      Complexity Add-on Statement: The longitudinal plan of care for the diagnosis(es)/condition(s) as documented were addressed during this visit. Due to the added complexity in care, I will continue to support Salas in the subsequent management and with ongoing continuity of care.    Lloyd Goode MD, MHA  Associate Professor of Medicine  Section of Interventional Pulmonology   Division of Pulmonary, Allergy, Critical Care and Sleep Medicine   [facility]  Pager: [phone]   Office: [phone]  Email: [email or web address]    Daniella Pritchard RN   Interventional Pulmonary Care Coordinator   Office: [phone]  Email: [email or web address]          History:   Salas Clinton is a 51 year old male with sig h/o for GERD, Hepatitis, Seizure who is here for evaluation/followup of lung nodule(s).     - No new resp sx or complaints. Denies dyspnea or cough.   - here for follow-up nodules  - Personal hx of cancer: No.   - Family hx of cancer: No lung cancer  - Exposure hx: Denies asbestos or radon exposure   - Tobacco  hx: Current Smoker: 1ppd since 8yo. Smoking 6-8cig/d  - My interpretation of the images relevant for this visit includes: nodules   - My interpretation of the PFT's relevant for this visit includes: None      Culprit Nodule(s):   1. LEONOR 6 mm (series 3, images 39-41).  2. LEONOR 7 x 4 mm (series 3, image 20).   3. Stable small left major fissure pleural nodule (image 60).     Other active medical problems include:   - Has GERD. Stable    - Has hepatitis. Stable            Past Medical History:      Past Medical History:   Diagnosis Date    GERD (gastroesophageal reflux disease)     Hematochezia     Hepatitis     ETOH    Hyperlipidemia     Lower Back Pain     Created by Conversion     Seizure (H)            Past Surgical History:      Past Surgical History:   Procedure Laterality Date    ESOPHAGOSCOPY, GASTROSCOPY, DUODENOSCOPY (EGD), COMBINED N/A 1/5/2023    Procedure: ESOPHAGOGASTRODUODENOSCOPY, WITH BIOPSY;  Surgeon: Susana Bolaños MD;  Location:  GI    ESOPHAGOSCOPY, GASTROSCOPY, DUODENOSCOPY (EGD), COMBINED N/A 8/24/2023    Procedure: ESOPHAGOGASTRODUODENOSCOPY, WITH BIOPSY;  Surgeon: Susana Bolaños MD;  Location:  GI    FOOT SURGERY Right           Social History:     Social History     Tobacco Use    Smoking status: Every Day     Types: Cigarettes     Passive exposure: Past    Smokeless tobacco: Former     Types: Chew    Tobacco comments:     Smoking 6-8 cigarettes daily and chewing Betelnut without Tobacco **as of June 2025**   Substance Use Topics    Alcohol use: No     Comment: Alcoholic Drinks/day: no longer drinking since 4/1/2017          Family History:     Family History   Problem Relation Age of Onset    Snoring Father            Allergies:      Allergies   Allergen Reactions    Folic Acid           Medications:     Current Outpatient Medications   Medication Sig Dispense Refill    acetaminophen (TYLENOL) 500 MG tablet Take 1,000 mg by mouth as needed for mild pain       ferrous sulfate (FEROSUL) 325 (65 Fe) MG tablet Take 1 tablet (325 mg) by mouth 2 times daily 60 tablet 1    ibuprofen (ADVIL/MOTRIN) 200 MG tablet Take 400 mg by mouth as needed for pain      levothyroxine (SYNTHROID/LEVOTHROID) 50 MCG tablet Take 1 tablet (50 mcg) by mouth daily 90 tablet 3    omeprazole (PRILOSEC) 20 MG DR capsule Take 1 capsule (20 mg) by mouth daily 90 capsule 3     No current facility-administered medications for this visit.            Review of Systems:     12-point ROS reviewed and abnormalities stated in the history.         Physical Exam:     Constitutional - looks well, in no apparent distress  Respiratory -breathing appears comfortable.   Skin - No appreciable discoloration or lesions (very limited exam)  Neurological - No apparent tremors. Speech fluent and articlate          Current Laboratory Data:   All laboratory and imaging data reviewed.    No results found for this or any previous visit (from the past 24 hours).       No data to display                    [facility] CT scan Radiation Dose  Low dose Chest CT (DLP 50) = 0.6 mSV  Full Chest CT (DLP=50) = 1.2 mSV   Minimum effective dose to have risk for radiation induced cancer =  mSV

## (undated) RX ORDER — ONDANSETRON 2 MG/ML
INJECTION INTRAMUSCULAR; INTRAVENOUS
Status: DISPENSED
Start: 2023-08-24

## (undated) RX ORDER — PROPOFOL 10 MG/ML
INJECTION, EMULSION INTRAVENOUS
Status: DISPENSED
Start: 2023-08-24

## (undated) RX ORDER — FENTANYL CITRATE 50 UG/ML
INJECTION, SOLUTION INTRAMUSCULAR; INTRAVENOUS
Status: DISPENSED
Start: 2023-01-05

## (undated) RX ORDER — SIMETHICONE 40MG/0.6ML
SUSPENSION, DROPS(FINAL DOSAGE FORM)(ML) ORAL
Status: DISPENSED
Start: 2023-08-24

## (undated) RX ORDER — FENTANYL CITRATE-0.9 % NACL/PF 10 MCG/ML
PLASTIC BAG, INJECTION (ML) INTRAVENOUS
Status: DISPENSED
Start: 2023-08-24

## (undated) RX ORDER — FENTANYL CITRATE 50 UG/ML
INJECTION, SOLUTION INTRAMUSCULAR; INTRAVENOUS
Status: DISPENSED
Start: 2023-08-24

## (undated) RX ORDER — GLYCOPYRROLATE 0.2 MG/ML
INJECTION, SOLUTION INTRAMUSCULAR; INTRAVENOUS
Status: DISPENSED
Start: 2023-08-24